# Patient Record
Sex: MALE | Race: WHITE | NOT HISPANIC OR LATINO
[De-identification: names, ages, dates, MRNs, and addresses within clinical notes are randomized per-mention and may not be internally consistent; named-entity substitution may affect disease eponyms.]

---

## 2020-10-06 ENCOUNTER — TRANSCRIPTION ENCOUNTER (OUTPATIENT)
Age: 85
End: 2020-10-06

## 2020-12-13 ENCOUNTER — INPATIENT (INPATIENT)
Facility: HOSPITAL | Age: 85
LOS: 9 days | Discharge: NURSING, REHAB OR LONG-TERM CARE | End: 2020-12-23
Attending: INTERNAL MEDICINE | Admitting: INTERNAL MEDICINE
Payer: MEDICARE

## 2020-12-13 VITALS
DIASTOLIC BLOOD PRESSURE: 65 MMHG | HEART RATE: 95 BPM | OXYGEN SATURATION: 95 % | RESPIRATION RATE: 18 BRPM | TEMPERATURE: 98 F | SYSTOLIC BLOOD PRESSURE: 146 MMHG

## 2020-12-13 DIAGNOSIS — L89.159 PRESSURE ULCER OF SACRAL REGION, UNSPECIFIED STAGE: ICD-10-CM

## 2020-12-13 DIAGNOSIS — D72.829 ELEVATED WHITE BLOOD CELL COUNT, UNSPECIFIED: ICD-10-CM

## 2020-12-13 LAB
ALBUMIN SERPL ELPH-MCNC: 2.8 G/DL — LOW (ref 3.3–5)
ALP SERPL-CCNC: 132 U/L — HIGH (ref 40–120)
ALT FLD-CCNC: 16 U/L — SIGNIFICANT CHANGE UP (ref 4–41)
ANION GAP SERPL CALC-SCNC: 9 MMOL/L — SIGNIFICANT CHANGE UP (ref 7–14)
APPEARANCE UR: ABNORMAL
AST SERPL-CCNC: 14 U/L — SIGNIFICANT CHANGE UP (ref 4–40)
BASOPHILS # BLD AUTO: 0 K/UL — SIGNIFICANT CHANGE UP (ref 0–0.2)
BASOPHILS NFR BLD AUTO: 0 % — SIGNIFICANT CHANGE UP (ref 0–2)
BILIRUB SERPL-MCNC: 0.5 MG/DL — SIGNIFICANT CHANGE UP (ref 0.2–1.2)
BILIRUB UR-MCNC: NEGATIVE — SIGNIFICANT CHANGE UP
BUN SERPL-MCNC: 38 MG/DL — HIGH (ref 7–23)
CALCIUM SERPL-MCNC: 9.2 MG/DL — SIGNIFICANT CHANGE UP (ref 8.4–10.5)
CHLORIDE SERPL-SCNC: 111 MMOL/L — HIGH (ref 98–107)
CO2 SERPL-SCNC: 28 MMOL/L — SIGNIFICANT CHANGE UP (ref 22–31)
COLOR SPEC: YELLOW — SIGNIFICANT CHANGE UP
CREAT SERPL-MCNC: 0.77 MG/DL — SIGNIFICANT CHANGE UP (ref 0.5–1.3)
DIFF PNL FLD: ABNORMAL
EOSINOPHIL # BLD AUTO: 0 K/UL — SIGNIFICANT CHANGE UP (ref 0–0.5)
EOSINOPHIL NFR BLD AUTO: 0 % — SIGNIFICANT CHANGE UP (ref 0–6)
GLUCOSE SERPL-MCNC: 196 MG/DL — HIGH (ref 70–99)
GLUCOSE UR QL: ABNORMAL
HCT VFR BLD CALC: 28.1 % — LOW (ref 39–50)
HGB BLD-MCNC: 8.6 G/DL — LOW (ref 13–17)
IANC: 26.04 K/UL — HIGH (ref 1.5–8.5)
KETONES UR-MCNC: NEGATIVE — SIGNIFICANT CHANGE UP
LEUKOCYTE ESTERASE UR-ACNC: ABNORMAL
LYMPHOCYTES # BLD AUTO: 2.03 K/UL — SIGNIFICANT CHANGE UP (ref 1–3.3)
LYMPHOCYTES # BLD AUTO: 6.9 % — LOW (ref 13–44)
MCHC RBC-ENTMCNC: 30.6 GM/DL — LOW (ref 32–36)
MCHC RBC-ENTMCNC: 31.6 PG — SIGNIFICANT CHANGE UP (ref 27–34)
MCV RBC AUTO: 103.3 FL — HIGH (ref 80–100)
MONOCYTES # BLD AUTO: 0.5 K/UL — SIGNIFICANT CHANGE UP (ref 0–0.9)
MONOCYTES NFR BLD AUTO: 1.7 % — LOW (ref 2–14)
NEUTROPHILS # BLD AUTO: 26.38 K/UL — HIGH (ref 1.8–7.4)
NEUTROPHILS NFR BLD AUTO: 83.5 % — HIGH (ref 43–77)
NITRITE UR-MCNC: NEGATIVE — SIGNIFICANT CHANGE UP
PH UR: 6 — SIGNIFICANT CHANGE UP (ref 5–8)
PLATELET # BLD AUTO: 223 K/UL — SIGNIFICANT CHANGE UP (ref 150–400)
POTASSIUM SERPL-MCNC: 3.7 MMOL/L — SIGNIFICANT CHANGE UP (ref 3.5–5.3)
POTASSIUM SERPL-SCNC: 3.7 MMOL/L — SIGNIFICANT CHANGE UP (ref 3.5–5.3)
PROT SERPL-MCNC: 6.7 G/DL — SIGNIFICANT CHANGE UP (ref 6–8.3)
PROT UR-MCNC: ABNORMAL
RBC # BLD: 2.72 M/UL — LOW (ref 4.2–5.8)
RBC # FLD: 16.4 % — HIGH (ref 10.3–14.5)
SARS-COV-2 RNA SPEC QL NAA+PROBE: SIGNIFICANT CHANGE UP
SODIUM SERPL-SCNC: 148 MMOL/L — HIGH (ref 135–145)
SP GR SPEC: 1.02 — SIGNIFICANT CHANGE UP (ref 1.01–1.02)
UROBILINOGEN FLD QL: SIGNIFICANT CHANGE UP
WBC # BLD: 29.44 K/UL — HIGH (ref 3.8–10.5)
WBC # FLD AUTO: 29.44 K/UL — HIGH (ref 3.8–10.5)

## 2020-12-13 PROCEDURE — 74018 RADEX ABDOMEN 1 VIEW: CPT | Mod: 26

## 2020-12-13 PROCEDURE — 99283 EMERGENCY DEPT VISIT LOW MDM: CPT

## 2020-12-13 PROCEDURE — 71045 X-RAY EXAM CHEST 1 VIEW: CPT | Mod: 26

## 2020-12-13 PROCEDURE — 99223 1ST HOSP IP/OBS HIGH 75: CPT

## 2020-12-13 RX ORDER — SODIUM CHLORIDE 9 MG/ML
1000 INJECTION, SOLUTION INTRAVENOUS
Refills: 0 | Status: DISCONTINUED | OUTPATIENT
Start: 2020-12-13 | End: 2020-12-23

## 2020-12-13 RX ORDER — PIPERACILLIN AND TAZOBACTAM 4; .5 G/20ML; G/20ML
3.38 INJECTION, POWDER, LYOPHILIZED, FOR SOLUTION INTRAVENOUS ONCE
Refills: 0 | Status: COMPLETED | OUTPATIENT
Start: 2020-12-13 | End: 2020-12-13

## 2020-12-13 RX ORDER — PIPERACILLIN AND TAZOBACTAM 4; .5 G/20ML; G/20ML
3.38 INJECTION, POWDER, LYOPHILIZED, FOR SOLUTION INTRAVENOUS EVERY 8 HOURS
Refills: 0 | Status: DISCONTINUED | OUTPATIENT
Start: 2020-12-14 | End: 2020-12-14

## 2020-12-13 RX ORDER — LEVETIRACETAM 250 MG/1
500 TABLET, FILM COATED ORAL EVERY 12 HOURS
Refills: 0 | Status: DISCONTINUED | OUTPATIENT
Start: 2020-12-13 | End: 2020-12-16

## 2020-12-13 RX ORDER — VANCOMYCIN HCL 1 G
1000 VIAL (EA) INTRAVENOUS ONCE
Refills: 0 | Status: COMPLETED | OUTPATIENT
Start: 2020-12-13 | End: 2020-12-13

## 2020-12-13 RX ORDER — SODIUM CHLORIDE 9 MG/ML
3 INJECTION INTRAMUSCULAR; INTRAVENOUS; SUBCUTANEOUS EVERY 8 HOURS
Refills: 0 | Status: DISCONTINUED | OUTPATIENT
Start: 2020-12-13 | End: 2020-12-23

## 2020-12-13 RX ADMIN — PIPERACILLIN AND TAZOBACTAM 3.38 GRAM(S): 4; .5 INJECTION, POWDER, LYOPHILIZED, FOR SOLUTION INTRAVENOUS at 19:18

## 2020-12-13 RX ADMIN — SODIUM CHLORIDE 3 MILLILITER(S): 9 INJECTION INTRAMUSCULAR; INTRAVENOUS; SUBCUTANEOUS at 21:49

## 2020-12-13 RX ADMIN — Medication 250 MILLIGRAM(S): at 20:11

## 2020-12-13 RX ADMIN — PIPERACILLIN AND TAZOBACTAM 200 GRAM(S): 4; .5 INJECTION, POWDER, LYOPHILIZED, FOR SOLUTION INTRAVENOUS at 18:54

## 2020-12-13 NOTE — H&P ADULT - NSHPREVIEWOFSYSTEMS_GEN_ALL_CORE
Patient non verbal at this time Awake but Oriented x 0 unable to provide any medical information Patient with very limited verbal output and limited cognition;  ROS incomplete due to the above;

## 2020-12-13 NOTE — H&P ADULT - PROBLEM SELECTOR PLAN 4
h/o ICH s/p fall 11/3/20 - stable   Seizure ppx - Continue  Keppra 100 mg / ml oral q 12 hrs via PEG   Continue Folic Acid 1mg qd via PEG   Continue Vitamin B12 via PEG   Patient on ASA 81 mg via PEG PEG tube [facility]ged [facility] then s/p PEG replacement at [facility]  s/p PEG in situ   f/u Abdominal XRay report   GI c/s in for PEG placement verification in AM  NPO for now  IV hydration while NPO  Convert all oral essential medications to IV form

## 2020-12-13 NOTE — H&P ADULT - PROBLEM SELECTOR PLAN 3
s/p PEG in situ verify PEG placement  Abdominal XRay ordered Unstageable sacral ulcer 2x3cm  Wound care consult and evaluation requested

## 2020-12-13 NOTE — H&P ADULT - NSHPSOCIALHISTORY_GEN_ALL_CORE
Resident at [facility] - Bed Bound    h/o Chronic ETOH abuse   Hard of Hearing Resident at [facility] - Bed Bound    h/o Chronic ETOH abuse   Hard of Hearing  Bedbound/debility

## 2020-12-13 NOTE — H&P ADULT - ASSESSMENT
Patient is a 89 y/o white male resident at  [facility] with PMHx significant for HTN, DM, [place], chronic ETOH abuse, ICH s/p fall (recently treated at [facility] on 11/3/20 for ICH s/p fall & DKA) started on Keppra for seizure ppx and dysphagia s/p PEG placement and chronic zamudio. While at [facility] pt's PEG tube was dislodged s/p PEG replacement at [facility] transferred to [facility] to verify PEG tube placement.   Patient is Awake but Oriented  x 0, unable to give any information. In the ER pt was found to have leukocytosis WBC 29.4, UA+ pt was afebrile, Vital signs were stable pt was given a dose of Zosyn IV & Vanco IV.      89 y/o white male resident at  [facility] with PMHx significant for HTN, DM, [place], chronic ETOH abuse, ICH s/p fall (recently treated at [facility] on 11/3/20 for ICH s/p fall & DKA) started on Keppra for seizure ppx and dysphagia s/p PEG placement and chronic zamudio. While at [facility] pt's PEG tube was dislodged s/p PEG replacement at [facility] transferred to [facility] to verify PEG tube placement.   Patient is Awake but Oriented  x 0, unable to give any information. In the ER pt was found to have leukocytosis WBC 29.4, UA+ pt was afebrile, Vital signs were stable pt was given a dose of Zosyn IV & Vanco IV.     89 y/o white male, bedbound, resident at  [facility] with MHx significant for HTN, DM, [place], h/o chronic ETOH abuse, ICH s/p fall (recently treated at [facility] on 11/3/20 for ICH s/p fall & DKA), dysphagia s/p PEG, chronic zamudio a/w PEG tube malfunction; Found to be septic likely due to complicated UTI;  89 y/o white male, bedbound, resident at  [facility] with MHx significant for HTN, DM, [place], h/o chronic ETOH abuse, ICH s/p fall (recently treated at [facility] on 11/3/20 for ICH s/p fall & DKA), dysphagia s/p PEG, chronic zamudio a/w PEG tube malfunction; Found to be septic likely due to complicated UTI; Unclear indication for existing PICC line vs PICC line malfunction;  89 y/o white male, bedbound, resident at  [facility] with MHx significant for HTN, DM, [place], h/o chronic ETOH abuse, ICH s/p fall (recently treated at [facility] on 11/3/20 for ICH s/p fall & DKA), dysphagia s/p PEG, chronic zamudio a/w PEG tube malfunction; Found to be septic likely due to complicated UTI r/o PNa; Unclear indication for existing PICC line vs PICC line malfunction; Found to be cachectic with FTT;

## 2020-12-13 NOTE — H&P ADULT - CONSTITUTIONAL DETAILS
elderly white male A & Ox 3, was laying comfortably in bed in no acute distress/well-developed/no distress no acute distress/no distress/cachectic

## 2020-12-13 NOTE — ED PROVIDER NOTE - MUSCULOSKELETAL, MLM
[facility]  [address]  [facility] [address]  Phone: [phone]        May 28, 2019    [address] [place] [address] Street [facility]      To whom it may concern Juanjose Bonner was admitted to [facility] on 5/18/2019-5/24/2019. The parent Michael Ríos was present with him. If you have any questions or concerns, please don't hesitate to call.     Sincerely,
[facility]  [address]  [facility] [address]  Phone: [phone]    No name on file. May 28, 2019    [address] [place] [address]      To whom it may concern Amara Chavez was admitted to [facility] on 5/24/2019-5/18/2019. The parent Rupert Whiting was present here with him. If you have any questions or concerns, please don't hesitate to call. Sincerely,     Andriy Kirkland RN       No name on file.
[facility]  [address] [facility] [address]  Phone: [phone]            May 28, 2019      To whom it may concern,  Joanne Dempsey was a patient at [facility] from 5/24/19- 5/28/19.       Sincerely,        Hallie Kim RN
Spine appears normal, range of motion is not limited, no muscle or joint tenderness

## 2020-12-13 NOTE — H&P ADULT - PROBLEM SELECTOR PLAN 2
h/o Urinary retention on Doxazosin 4 mg qd via PEG  Monitor UO -Urinary catheter: replaced on 9S  -Empiric zosyn  -BCx, UCx pending

## 2020-12-13 NOTE — ED PROVIDER NOTE - PROGRESS NOTE DETAILS
Yury Curiel, PGY-2: Patient's WBC 29, called sarai Singh and on call physician reported WBC was 8 a week PTA. CXR showing LLL PNA, will treat with vanc and zosyn for healthcare associated PNA at this time. Tube check completed at bedside, peg tube in place.

## 2020-12-13 NOTE — H&P ADULT - PROBLEM SELECTOR PROBLEM 8
Decubitus ulcer of sacral area CAD (coronary artery disease) Type 2 diabetes mellitus with hyperglycemia, with long-term current use of insulin

## 2020-12-13 NOTE — H&P ADULT - NSHPLABSRESULTS_GEN_ALL_CORE
Vital Signs Last 24 Hrs  T(C): 36.8 (13 Dec 2020 20:54), Max: 37.4 (13 Dec 2020 17:12)  T(F): 98.2 (13 Dec 2020 20:54), Max: 99.3 (13 Dec 2020 17:12)  HR: 87 (13 Dec 2020 20:54) (83 - 95)  BP: 156/74 (13 Dec 2020 20:54) (136/56 - 156/74)  RR: 16 (13 Dec 2020 20:54) (16 - 18)  SpO2: 97% (13 Dec 2020 20:54) (95% - 99%)                        8.6    29.44 )-----------( 223      ( 13 Dec 2020 15:53 )             28.1       148<H>  |  111<H>  |  38<H>  ----------------------------<  196<H>  3.7   |  28  |  0.77    Ca    9.2      13 Dec 2020 15:53    TPro  6.7  /  Alb  2.8<L>  /  TBili  0.5  /  DBili  x   /  AST  14  /  ALT  16  /  AlkPhos  132<H>  12-13  Urinalysis Basic - ( 13 Dec 2020 17:33 )    Color: Yellow / Appearance: Slightly Turbid / S.024 / pH: x  Gluc: x / Ketone: Negative  / Bili: Negative / Urobili: <2 mg/dL   Blood: x / Protein: 100 mg/dL / Nitrite: Negative   Leuk Esterase: Small / RBC: 6 /HPF / WBC 30 /HPF   Sq Epi: x / Non Sq Epi: 0 /HPF / Bacteria: Many    < from: Xray Chest 1 View- PORTABLE-Urgent (Xray Chest 1 View- PORTABLE-Urgent .) (20 @ 17:55) >    . Hazy airspace opacities in the left lower lung, which may represent pneumonia in appropriate clinical setting.  2.  Pulmonary nodule measuring 3.0 cm in the left lower lung. Further evaluation with noncontrast Chest CT can be obtained if clinically warranted.  3.  No PICC line visible. EKG, , NSR, 85bpm, qtc 397, no acute Tw or ST changes - my reading     XR CHEST PORTABLE URGENT 1V  PROCEDURE DATE: Dec 13 2020  No PICC line is seen in this image.  Left retrocardiac opacity likely subsegmental atelectasis.  No pleural effusion or pneumothorax.  Heart size is within normal limits.  No acute abnormality within visible osseous structures.  Pulmonary nodule measuring 3.0 cm in the left lower lung.  Nodular opacity in the right hemithorax measuring 1.3 cm, may be the patient's nipple versus a pulmonary nodule.  IMPRESSION:  1. Hazy airspace opacities in the left lower lung, which may represent pneumonia in appropriate clinical setting.  2. Pulmonary nodule measuring 3.0 cm in the left lower lung. Further evaluation with noncontrast Chest CT can be obtained if clinically warranted.  3. No PICC line visible.                        8.6    29.44 )-----------( 223      ( 13 Dec 2020 15:53 )             28.1       148<H>  |  111<H>  |  38<H>  ----------------------------<  196<H>  3.7   |  28  |  0.77    Ca    9.2      13 Dec 2020 15:53    TPro  6.7  /  Alb  2.8<L>  /  TBili  0.5  /  DBili  x   /  AST  14  /  ALT  16  /  AlkPhos  132<H>  12-  Urinalysis Basic - ( 13 Dec 2020 17:33 )    Color: Yellow / Appearance: Slightly Turbid / S.024 / pH: x  Gluc: x / Ketone: Negative  / Bili: Negative / Urobili: <2 mg/dL   Blood: x / Protein: 100 mg/dL / Nitrite: Negative   Leuk Esterase: Small / RBC: 6 /HPF / WBC 30 /HPF   Sq Epi: x / Non Sq Epi: 0 /HPF / Bacteria: Many

## 2020-12-13 NOTE — H&P ADULT - PROBLEM SELECTOR PLAN 6
NPO   Fingerstick q 6 hrs  Insulin sliding scale Monitor BP per routine  Norvasc 10 mg qd via PEG   Hydralazine 10 mg q 8 hrs via PEG NPO given PEG tube malfunction   Fingerstick q 6 hrs  Insulin sliding scale Found to be severely cachectic with clear evidence of FTT including TBW 39kg, severe hypoalbuminemia of 2.8, severe dehydration BUN: Scr ratio of 50; Hypernatremia of 148;   -Palliative care team c/s for GOC and DNR/DNI status prior to d/c to [facility]  -IV hydration, 1/2 NS Found to be severely cachectic with clear evidence of FTT including TBW 39kg, severe hypoalbuminemia of 2.8, severe dehydration BUN: Scr ratio of 50; Hypernatremia of 148;   -Palliative care team c/s for GOC and DNR/DNI status prior to d/c to [facility]  -IV hydration, 1/2 NS  -Nutritionist c/s in AM

## 2020-12-13 NOTE — ED PROVIDER NOTE - ATTENDING CONTRIBUTION TO CARE
mitch: pt 89 man multiple medical issues pt at [place] transferred to [facility] to evaluate peg tube placement.  pt poorly communicative, has very large tongue, dry membranes, bs bilaterally.  abd soft.  at sign out 4pm awaiting labs and film for peg placement.  signed out to Dr. Sierra at 4pm.    I performed a history and physical exam of the patient and discussed their management with the resident and /or advanced care provider. I reviewed the resident and /or ACP's note and agree with the documented findings and plan of care. My medical decison making and observations are found above.

## 2020-12-13 NOTE — H&P ADULT - PROBLEM SELECTOR PROBLEM 5
HTN (hypertension) ICH (intracerebral hemorrhage) R/O PICC (peripherally inserted central catheter) removal

## 2020-12-13 NOTE — H&P ADULT - HISTORY OF PRESENT ILLNESS
Patient is a 89 y/o white male resident at  [facility] with PMHx significant for HTN, DM, [place], chronic ETOH abuse, ICH s/p fall (recently treated at [facility] on 11/3/20 for ICH s/p fall & DKA) started on Keppra for seizure ppx and dysphagia s/p PEG placement and chronic zamudio. While at [facility] pt's PEG tube was dislodged s/p PEG replacement at [facility] transferred to [facility] to verify PEG tube placement.   Patient is Awake but Oriented  x 0, unable to give any information. In the ER pt was found to have leukocytosis WBC 29.4, UA+ pt was afebrile, Vital signs were stable pt was given a dose of Zosyn IV & Vanco IV.  89 y/o white male resident at  [facility] with PMHx significant for HTN, DM, [place], chronic ETOH abuse, ICH s/p fall (recently treated at [facility] on 11/3/20 for ICH s/p fall & DKA) started on Keppra for seizure ppx and dysphagia s/p PEG placement and chronic zamudio. While at [facility] pt's PEG tube was dislodged s/p PEG replacement at [facility] transferred to [facility] to verify PEG tube placement.   Patient is Awake but Oriented  x 0, unable to give any information. In the ER pt was found to have leukocytosis WBC 29.4, UA+ pt was afebrile, Vital signs were stable pt was given a dose of Zosyn IV & Vanco IV.  89 y/o white male, bedbound, resident at  [facility] with MHx significant for HTN, DM, [place], h/o chronic ETOH abuse, ICH s/p fall (recently treated at [facility] on 11/3/20 for ICH s/p fall & DKA) started on Keppra for seizure ppx and dysphagia s/p PEG placement and chronic zamudio. While at [facility] pt's PEG tube was dislodged s/p PEG replacement at \[facility]\"" transferred to [facility] to verify PEG tube placement. Patient is awake however with limited cognition, able to provide only simplified ROS, voices no complaints.     ED course: Zosyn IV & Vanco IV;

## 2020-12-13 NOTE — ED ADULT TRIAGE NOTE - CHIEF COMPLAINT QUOTE
Pt brought in by EMS from [place] for peg tube dislodgement, which was put back in by Nils but sent for placement check.

## 2020-12-13 NOTE — H&P ADULT - GASTROINTESTINAL COMMENTS
s/p PEG tube placement at Left abdominal wall - site clean no discharge noted, no erythema s/p PEG tube placement at Left abdominal wall - site clean no discharge noted, no erythema, no TTP

## 2020-12-13 NOTE — H&P ADULT - PROBLEM SELECTOR PLAN 9
VTE ppx - Venodynes bilateral lower extremities h/o ICH s/p fall 11/3/20 - stable   Seizure ppx - Continue  Keppra 100 mg / ml oral q 12 hrs via PEG   Continue Folic Acid 1mg qd via PEG   Continue Vitamin B12 via PEG   Patient on ASA 81 mg via PEG  NPO - pending PEG tube placement confirmation by GI  Aspiration precautions h/o ICH s/p fall 11/3/20 - stable   Seizure ppx - Continue  Keppra 100 mg / ml oral q 12 hrs via PEG   No Heparin for DVT ppx --> SCDs  Continue Folic Acid 1mg qd via PEG   Continue Vitamin B12 via PEG   Patient on ASA 81 mg via PEG  NPO - pending PEG tube placement confirmation by GI  Aspiration precautions

## 2020-12-13 NOTE — H&P ADULT - MENTAL STATUS
Awake, but Oriented  x 0 Not lethargic, follows some simple one-step commands, oriented to self and partially to place;

## 2020-12-13 NOTE — H&P ADULT - PROBLEM SELECTOR PROBLEM 7
CAD (coronary artery disease) DM2 (diabetes mellitus, type 2) ICH (intracerebral hemorrhage) Macrocytic anemia

## 2020-12-13 NOTE — H&P ADULT - NSICDXPASTMEDICALHX_GEN_ALL_CORE_FT
PAST MEDICAL HISTORY:  CAD (coronary artery disease)     DM2 (diabetes mellitus, type 2)     Dysphagia s/p PEG placement    ETOH abuse chronic ETOH abuse    HTN (hypertension)     ICH (intracerebral hemorrhage) s/p fall

## 2020-12-13 NOTE — H&P ADULT - PROBLEM SELECTOR PLAN 5
Monitor BP per routine  Norvasc 10 mg qd via PEG   Hydralazine 10 mg q 8 hrs via PEG h/o ICH s/p fall 11/3/20 - stable   Seizure ppx - Continue  Keppra 100 mg / ml oral q 12 hrs via PEG   Continue Folic Acid 1mg qd via PEG   Continue Vitamin B12 via PEG   Patient on ASA 81 mg via PEG Unclear indication for existing PICC line vs PICC line malfunction;  Official CXR report: No PICC line visible.  -Stop accessing PICC line order for RN entered - 9S nursing aware, s/p peripheral   line placement;  -Reach out to [facility] in regarding PICC line indication   -IR c/s for removal or correct placement confirmation given inconsistency between physical line exam/presence and CXR report;

## 2020-12-13 NOTE — H&P ADULT - PROBLEM SELECTOR PLAN 7
EKG pending   CAD stable on ASA 81 mg qd via PEG NPO   Fingerstick q 6 hrs  Insulin sliding scale h/o ICH s/p fall 11/3/20 - stable   Seizure ppx - Continue  Keppra 100 mg / ml oral q 12 hrs via PEG   Continue Folic Acid 1mg qd via PEG   Continue Vitamin B12 via PEG   Patient on ASA 81 mg via PEG , Hgb likely falsely elevated, 8.6, due to severe dehydration with BUN: Scr ratio of 50;   -Occult blood  -B12, TSH, Folate in AM  -Continue Folic Acid 1mg qd once PEG confirmed  -Continue Vitamin B12 once PEG confirmed   -SCDs pending occult blood , Hgb likely falsely elevated, 8.6, due to severe dehydration with BUN: Scr ratio of 50;   -Occult blood  -B12, TSH, Folate in AM  -Continue Folic Acid 1mg qd once PEG confirmed  -Continue Vitamin B12 once PEG confirmed   -SCDs pending occult blood  -Monitor Hgb daily, recheck after rehydrated

## 2020-12-13 NOTE — ED PROVIDER NOTE - OBJECTIVE STATEMENT
Patient is 89 yom with PMH of chronic etoh abuse, dm2, from [facility] after recent admission at [facility]  with management of ICH and DKA after a fall on 11/3. At [facility], he was treated for DKA, started on keppra for seizure ppx, and received a PEG tube for dysphagia. Patient also has a chronic zamudio.    Now patient was transferred from [facility] for dislodgement of his PEG tube. It was replaced and he is transferred here for placement. Patient now is very [place] and slow to respond. Patient denies abdominal pain/n/v/shortness of breath/CP.

## 2020-12-13 NOTE — H&P ADULT - PROBLEM SELECTOR PROBLEM 1
UTI (urinary tract infection) Sepsis, due to unspecified organism, unspecified whether acute organ dysfunction present

## 2020-12-13 NOTE — H&P ADULT - NSHPSOURCEINFOTX_GEN_ALL_CORE
Medical Records from [facility] Medical Records from [facility], Medrec from [facility]

## 2020-12-13 NOTE — H&P ADULT - LYMPHATIC
posterior cervical L/anterior cervical L posterior cervical L/posterior cervical R/anterior cervical L/anterior cervical R/supraclavicular L/supraclavicular R

## 2020-12-13 NOTE — ED ADULT NURSE NOTE - CHIEF COMPLAINT QUOTE
Pt brought in by EMS from [place] for peg tube dislodgement, which was put back in by Nils but sent for placement check.

## 2020-12-13 NOTE — H&P ADULT - REASON FOR ADMISSION
s/p PEG tube dislodgement @ [facility] s/p PEG replacement at hospitals transferred to [facility] to verify PEG tube placement PEG tube dislodgement

## 2020-12-13 NOTE — ED PROVIDER NOTE - CLINICAL SUMMARY MEDICAL DECISION MAKING FREE TEXT BOX
Patient is 89 yom with PMH of chronic etoh abuse, dm2, from [facility] for peg tube placement check. found to also have leukocytosis of 29. will get infectious workup including UA, CXR, BCx, abd xray. Likely admit now. Patient is 89 yom with PMH of chronic etoh abuse, dm2, from [facility] for peg tube placement check. found to also have leukocytosis of 29. will get infectious workup including UA, CXR, BCx, abd xray. Likely admit now.    mitch: pt 89 man multiple medical issues pt at [place] transferred to [facility] to evaluate peg tube placement.  pt poorly communicative, has very large tongue, dry membranes, bs bilaterally.  abd soft.  at sign out 4pm awaiting labs and film for peg placement.  signed out to Dr. Sierra at 4pm.

## 2020-12-13 NOTE — H&P ADULT - PROBLEM SELECTOR PROBLEM 6
DM2 (diabetes mellitus, type 2) HTN (hypertension) Type 2 diabetes mellitus with hyperglycemia, with long-term current use of insulin FTT (failure to thrive) in adult

## 2020-12-13 NOTE — H&P ADULT - PROBLEM SELECTOR PLAN 8
Unstageable Sacral ulcer   Wound care consult and evaluation EKG pending   CAD stable on ASA 81 mg qd via PEG NPO given PEG tube malfunction   Fingerstick q 6 hrs  Insulin sliding scale  A1c in AM

## 2020-12-13 NOTE — ED ADULT TRIAGE NOTE - BP NONINVASIVE SYSTOLIC (MM HG)
Asc Procedure Text (A): After obtaining clear surgical margins the patient was sent to an ASC for surgical repair.  The patient understands they will receive post-surgical care and follow-up from the ASC physician. 146

## 2020-12-13 NOTE — ED ADULT NURSE REASSESSMENT NOTE - NS ED NURSE REASSESS COMMENT FT1
Report received from ASHLIE SALTER. Pt. received A&Ox0, with RUE PICC line, 14 Persian zamudio in place draining at bedside, unstageable pressure ulcer to sacrum quarter size and covered with Allevyn dressing. Appears in no acute distress. Respirations even and unlabored. Admitted to Medicine, awaiting bed assignment. Report given to [facility] ASHLIE Kauffman. Pt. transported to [facility] at present.

## 2020-12-14 DIAGNOSIS — K94.23 GASTROSTOMY MALFUNCTION: ICD-10-CM

## 2020-12-14 DIAGNOSIS — E11.65 TYPE 2 DIABETES MELLITUS WITH HYPERGLYCEMIA: ICD-10-CM

## 2020-12-14 DIAGNOSIS — D53.9 NUTRITIONAL ANEMIA, UNSPECIFIED: ICD-10-CM

## 2020-12-14 LAB
ANION GAP SERPL CALC-SCNC: 11 MMOL/L — SIGNIFICANT CHANGE UP (ref 7–14)
BUN SERPL-MCNC: 31 MG/DL — HIGH (ref 7–23)
CALCIUM SERPL-MCNC: 8.9 MG/DL — SIGNIFICANT CHANGE UP (ref 8.4–10.5)
CHLORIDE SERPL-SCNC: 109 MMOL/L — HIGH (ref 98–107)
CO2 SERPL-SCNC: 28 MMOL/L — SIGNIFICANT CHANGE UP (ref 22–31)
CREAT SERPL-MCNC: 0.74 MG/DL — SIGNIFICANT CHANGE UP (ref 0.5–1.3)
GLUCOSE BLDC GLUCOMTR-MCNC: 255 MG/DL — HIGH (ref 70–99)
GLUCOSE SERPL-MCNC: 257 MG/DL — HIGH (ref 70–99)
GRAM STN FLD: SIGNIFICANT CHANGE UP
HCT VFR BLD CALC: 28.6 % — LOW (ref 39–50)
HGB BLD-MCNC: 9.1 G/DL — LOW (ref 13–17)
INR BLD: 1.32 RATIO — HIGH (ref 0.88–1.17)
LEGIONELLA AG UR QL: NEGATIVE — SIGNIFICANT CHANGE UP
MAGNESIUM SERPL-MCNC: 2.1 MG/DL — SIGNIFICANT CHANGE UP (ref 1.6–2.6)
MCHC RBC-ENTMCNC: 31.8 GM/DL — LOW (ref 32–36)
MCHC RBC-ENTMCNC: 31.9 PG — SIGNIFICANT CHANGE UP (ref 27–34)
MCV RBC AUTO: 100.4 FL — HIGH (ref 80–100)
METHOD TYPE: SIGNIFICANT CHANGE UP
MRSA SPEC QL CULT: SIGNIFICANT CHANGE UP
NRBC # BLD: 0 /100 WBCS — SIGNIFICANT CHANGE UP
NRBC # FLD: 0 K/UL — SIGNIFICANT CHANGE UP
PLATELET # BLD AUTO: 224 K/UL — SIGNIFICANT CHANGE UP (ref 150–400)
POTASSIUM SERPL-MCNC: 3.8 MMOL/L — SIGNIFICANT CHANGE UP (ref 3.5–5.3)
POTASSIUM SERPL-SCNC: 3.8 MMOL/L — SIGNIFICANT CHANGE UP (ref 3.5–5.3)
PROTHROM AB SERPL-ACNC: 15 SEC — HIGH (ref 9.8–13.1)
RBC # BLD: 2.85 M/UL — LOW (ref 4.2–5.8)
RBC # FLD: 15.9 % — HIGH (ref 10.3–14.5)
SODIUM SERPL-SCNC: 148 MMOL/L — HIGH (ref 135–145)
SPECIMEN SOURCE: SIGNIFICANT CHANGE UP
SPECIMEN SOURCE: SIGNIFICANT CHANGE UP
WBC # BLD: 26.8 K/UL — HIGH (ref 3.8–10.5)
WBC # FLD AUTO: 26.8 K/UL — HIGH (ref 3.8–10.5)

## 2020-12-14 PROCEDURE — 99223 1ST HOSP IP/OBS HIGH 75: CPT | Mod: GC

## 2020-12-14 PROCEDURE — 99233 SBSQ HOSP IP/OBS HIGH 50: CPT

## 2020-12-14 RX ORDER — FOLIC ACID 0.8 MG
1 TABLET ORAL
Qty: 0 | Refills: 0 | DISCHARGE

## 2020-12-14 RX ORDER — DEXTROSE 50 % IN WATER 50 %
12.5 SYRINGE (ML) INTRAVENOUS ONCE
Refills: 0 | Status: DISCONTINUED | OUTPATIENT
Start: 2020-12-14 | End: 2020-12-16

## 2020-12-14 RX ORDER — INSULIN LISPRO 100/ML
VIAL (ML) SUBCUTANEOUS AT BEDTIME
Refills: 0 | Status: DISCONTINUED | OUTPATIENT
Start: 2020-12-14 | End: 2020-12-15

## 2020-12-14 RX ORDER — ATORVASTATIN CALCIUM 80 MG/1
1 TABLET, FILM COATED ORAL
Qty: 0 | Refills: 0 | DISCHARGE

## 2020-12-14 RX ORDER — ASPIRIN/CALCIUM CARB/MAGNESIUM 324 MG
1 TABLET ORAL
Qty: 0 | Refills: 0 | DISCHARGE

## 2020-12-14 RX ORDER — VANCOMYCIN HCL 1 G
500 VIAL (EA) INTRAVENOUS ONCE
Refills: 0 | Status: COMPLETED | OUTPATIENT
Start: 2020-12-14 | End: 2020-12-14

## 2020-12-14 RX ORDER — SODIUM CHLORIDE 9 MG/ML
1000 INJECTION, SOLUTION INTRAVENOUS
Refills: 0 | Status: DISCONTINUED | OUTPATIENT
Start: 2020-12-14 | End: 2020-12-16

## 2020-12-14 RX ORDER — VANCOMYCIN HCL 1 G
VIAL (EA) INTRAVENOUS
Refills: 0 | Status: DISCONTINUED | OUTPATIENT
Start: 2020-12-14 | End: 2020-12-14

## 2020-12-14 RX ORDER — PIPERACILLIN AND TAZOBACTAM 4; .5 G/20ML; G/20ML
3.38 INJECTION, POWDER, LYOPHILIZED, FOR SOLUTION INTRAVENOUS EVERY 8 HOURS
Refills: 0 | Status: DISCONTINUED | OUTPATIENT
Start: 2020-12-14 | End: 2020-12-16

## 2020-12-14 RX ORDER — CHLORHEXIDINE GLUCONATE 213 G/1000ML
1 SOLUTION TOPICAL DAILY
Refills: 0 | Status: DISCONTINUED | OUTPATIENT
Start: 2020-12-14 | End: 2020-12-14

## 2020-12-14 RX ORDER — VANCOMYCIN HCL 1 G
500 VIAL (EA) INTRAVENOUS EVERY 12 HOURS
Refills: 0 | Status: DISCONTINUED | OUTPATIENT
Start: 2020-12-15 | End: 2020-12-16

## 2020-12-14 RX ORDER — HYDRALAZINE HCL 50 MG
5 TABLET ORAL EVERY 6 HOURS
Refills: 0 | Status: DISCONTINUED | OUTPATIENT
Start: 2020-12-14 | End: 2020-12-16

## 2020-12-14 RX ORDER — VANCOMYCIN HCL 1 G
VIAL (EA) INTRAVENOUS
Refills: 0 | Status: DISCONTINUED | OUTPATIENT
Start: 2020-12-14 | End: 2020-12-16

## 2020-12-14 RX ORDER — DEXTROSE 50 % IN WATER 50 %
25 SYRINGE (ML) INTRAVENOUS ONCE
Refills: 0 | Status: DISCONTINUED | OUTPATIENT
Start: 2020-12-14 | End: 2020-12-16

## 2020-12-14 RX ORDER — DEXTROSE 50 % IN WATER 50 %
15 SYRINGE (ML) INTRAVENOUS ONCE
Refills: 0 | Status: DISCONTINUED | OUTPATIENT
Start: 2020-12-14 | End: 2020-12-16

## 2020-12-14 RX ORDER — PREGABALIN 225 MG/1
1 CAPSULE ORAL
Qty: 0 | Refills: 0 | DISCHARGE

## 2020-12-14 RX ORDER — CHLORHEXIDINE GLUCONATE 213 G/1000ML
1 SOLUTION TOPICAL DAILY
Refills: 0 | Status: DISCONTINUED | OUTPATIENT
Start: 2020-12-14 | End: 2020-12-23

## 2020-12-14 RX ORDER — AZITHROMYCIN 500 MG/1
500 TABLET, FILM COATED ORAL EVERY 24 HOURS
Refills: 0 | Status: COMPLETED | OUTPATIENT
Start: 2020-12-14 | End: 2020-12-14

## 2020-12-14 RX ORDER — GLUCAGON INJECTION, SOLUTION 0.5 MG/.1ML
1 INJECTION, SOLUTION SUBCUTANEOUS ONCE
Refills: 0 | Status: DISCONTINUED | OUTPATIENT
Start: 2020-12-14 | End: 2020-12-16

## 2020-12-14 RX ORDER — INSULIN LISPRO 100/ML
VIAL (ML) SUBCUTANEOUS
Refills: 0 | Status: DISCONTINUED | OUTPATIENT
Start: 2020-12-14 | End: 2020-12-15

## 2020-12-14 RX ADMIN — Medication 5 MILLIGRAM(S): at 13:22

## 2020-12-14 RX ADMIN — PIPERACILLIN AND TAZOBACTAM 25 GRAM(S): 4; .5 INJECTION, POWDER, LYOPHILIZED, FOR SOLUTION INTRAVENOUS at 01:51

## 2020-12-14 RX ADMIN — Medication 5 MILLIGRAM(S): at 17:49

## 2020-12-14 RX ADMIN — LEVETIRACETAM 400 MILLIGRAM(S): 250 TABLET, FILM COATED ORAL at 01:53

## 2020-12-14 RX ADMIN — SODIUM CHLORIDE 3 MILLILITER(S): 9 INJECTION INTRAMUSCULAR; INTRAVENOUS; SUBCUTANEOUS at 22:08

## 2020-12-14 RX ADMIN — SODIUM CHLORIDE 3 MILLILITER(S): 9 INJECTION INTRAMUSCULAR; INTRAVENOUS; SUBCUTANEOUS at 06:36

## 2020-12-14 RX ADMIN — SODIUM CHLORIDE 3 MILLILITER(S): 9 INJECTION INTRAMUSCULAR; INTRAVENOUS; SUBCUTANEOUS at 13:29

## 2020-12-14 RX ADMIN — Medication 5 MILLIGRAM(S): at 06:11

## 2020-12-14 RX ADMIN — AZITHROMYCIN 255 MILLIGRAM(S): 500 TABLET, FILM COATED ORAL at 01:51

## 2020-12-14 RX ADMIN — LEVETIRACETAM 400 MILLIGRAM(S): 250 TABLET, FILM COATED ORAL at 13:20

## 2020-12-14 RX ADMIN — PIPERACILLIN AND TAZOBACTAM 25 GRAM(S): 4; .5 INJECTION, POWDER, LYOPHILIZED, FOR SOLUTION INTRAVENOUS at 22:01

## 2020-12-14 RX ADMIN — PIPERACILLIN AND TAZOBACTAM 25 GRAM(S): 4; .5 INJECTION, POWDER, LYOPHILIZED, FOR SOLUTION INTRAVENOUS at 09:31

## 2020-12-14 RX ADMIN — SODIUM CHLORIDE 75 MILLILITER(S): 9 INJECTION, SOLUTION INTRAVENOUS at 01:50

## 2020-12-14 RX ADMIN — Medication 3: at 09:30

## 2020-12-14 RX ADMIN — Medication 100 MILLIGRAM(S): at 17:47

## 2020-12-14 NOTE — PROGRESS NOTE ADULT - PROBLEM SELECTOR PLAN 9
h/o ICH s/p fall 11/3/20 - stable   Seizure ppx - Continue  Keppra 100 mg / ml oral q 12 hrs via PEG   No Heparin for DVT ppx --> SCDs  Continue Folic Acid 1mg qd via PEG   Continue Vitamin B12 via PEG   Patient on ASA 81 mg via PEG  Aspiration precautions

## 2020-12-14 NOTE — CHART NOTE - TREATMENT: THE FOLLOWING DIET HAS BEEN RECOMMENDED
1. Recommend continuous TF via PEG with Glucerna1.5 [place] starts at 10ml/hr and increase 10ml q 8 hrs as tolerated to goal of 35ml/hr x24 hrs + No Carb Prosource x3 pkt daily (will provide a total of 1440kcal, 114g protein, 683ml free water, and 84% RDIs; ~36kcal/kg CBW and ~1.4g protein/kg IBW). Defer FWF to medical team. Consider adding Multivitamin to meet 100% RDIs. 2. Monitor weights, labs, BM's, skin integrity, TF intake/tolerance. 3. Further adjustments to rate/volume/duration/free water provision of enteral feeds dependant on long term monitoring of patient's tolerance and needs.     Please refer to nutrition assessment completed on 12/14/20 for other details.

## 2020-12-14 NOTE — DIETITIAN INITIAL EVALUATION ADULT. - ORAL INTAKE PTA/DIET HISTORY
Per [facility] transfer sheet on chart, on NPO + cyclic TF order with Glucerna1.5Cal 1000ml/d via PEG @ 60ml/hr from 4pm each day + LPS-SF 30ml via PEG 3x/day (a total of 1680kcal, 127.5g protein, and 759ml free water).

## 2020-12-14 NOTE — CONSULT NOTE ADULT - ATTENDING COMMENTS
89M with MRSA bacteremia, leukocytosis  -source is likely the line  -sacral wound doesn't appear infected  -favor DC right arm line if not needed  -vancomycin 500 mg iv q12  -check TTE    Amandeep Briceño  Attending Physician   Division of Infectious Disease  Pager #[phone]  After 5pm/weekend or no response, call #[phone]

## 2020-12-14 NOTE — DIETITIAN INITIAL EVALUATION ADULT. - PERTINENT LABORATORY DATA
12-14 Na148 mmol/L<H> Glu 257 mg/dL<H> K+ 3.8 mmol/L Cr  0.74 mg/dL BUN 31 mg/dL<H> 12-13 Alb 2.8 g/dL<L>

## 2020-12-14 NOTE — PROGRESS NOTE ADULT - ASSESSMENT
89 y/o white male, bedbound, resident at  [facility] with MHx significant for HTN, DM, [place], h/o chronic ETOH abuse, ICH s/p fall (recently treated at [facility] on 11/3/20 for ICH s/p fall & DKA), dysphagia s/p PEG, chronic zamudio a/w PEG tube malfunction; Found to be septic likely due to complicated UTI r/o PNa; Unclear indication for existing PICC line vs PICC line malfunction; Found to be cachectic with FTT; blood cultures positive for MRSA in 2 bottles

## 2020-12-14 NOTE — PROGRESS NOTE ADULT - ATTENDING COMMENTS
89 yo M w/ hx DM, [place], ICH s/p fall and DKA, Decubitus ulcer, chronic zamudio p/w dislodged PEG. PEG replaced and confirmed. On routine labs was found to have a  elevated WBC 28 prior WBC 8 1 week ago found to have LL PNA on Xray. Bcx- Gram positive cocci clusters PCR + MRSA   Sepsis secondary to UTI vs PNA-repeat Bcx                                                - vanco/zosyn                                                - TTE eval for endocarditis                                                -? indication for PICC line/PIV given bactermia will remove catheter  Decubitus Ulcer-wound care consult   hypernatremia-PEG tube in place and confirmed start glucerna 30 ml f/u nutrition recs in regards to optimal feeds 1/2 NS   DM- FS q6 ISS   Prior ICH- cont keppra 89 yo M w/ hx DM, [place], ICH s/p fall and DKA, Decubitus ulcer, chronic zamudio p/w dislodged PEG. PEG replaced and confirmed. On routine labs was found to have a  elevated WBC 28 prior WBC 8 1 week ago found to have LL PNA on Xray. Bcx- Gram positive cocci clusters PCR + MRSA   Sepsis secondary to UTI vs PNA vs line infection-repeat Bcx                                                - vanco/zosyn                                                - TTE eval for endocarditis                                                - CT chest to eval for PNA                                                 -? indication for PICC line/PIV given bactermia will remove catheter  Decubitus Ulcer-wound care consult   hypernatremia-PEG tube in place and confirmed start glucerna 30 ml f/u nutrition recs in regards to optimal feeds 1/2 NS   DM- FS q6 ISS   Prior ICH- cont keppra

## 2020-12-14 NOTE — PROGRESS NOTE ADULT - PROBLEM SELECTOR PLAN 6
Found to be severely cachectic with clear evidence of FTT including TBW 39kg, severe hypoalbuminemia of 2.8, severe dehydration BUN: Scr ratio of 50; Hypernatremia of 148;   -free water deficit of 0.5L. Added 200mL free water boluses Q6 hours. f/u BMP  -f/u nutrition recs

## 2020-12-14 NOTE — PROGRESS NOTE ADULT - PROBLEM SELECTOR PLAN 1
Leukocytosis 29K, Tachycardia, HR in 90s; UA c/w UTI; Chronic urinary catheter;   -PICC line, existing, of unclear indication - see plan below   -CXR: abnormal with possible PNA  -empiric Azithromycin IV x 1 dose pending Legionella Ag   -Zosyn IV  -Covid-19 negative  -MRSA positive blood culture x 2 bottles, started Vancomycin 600mg BID

## 2020-12-14 NOTE — PROGRESS NOTE ADULT - SUBJECTIVE AND OBJECTIVE BOX
Darnell Elkins MD PGY1   Team 7  Contact/Pager - [phone]      Patient is a 89y old  Male who presents with a chief complaint of PEG tube dislodgement (14 Dec 2020 12:59)        SUBJECTIVE / OVERNIGHT EVENTS: There were no events overnight. The patient is awake and alert. He is able to follow commands and responds to his name. His speech is unclear. He is not able to state his name, where he is, or the current year. He does not indicate any pain or discomfort.       MEDICATIONS  (STANDING):  dextrose 40% Gel 15 Gram(s) Oral once  dextrose 5%. 1000 milliLiter(s) (50 mL/Hr) IV Continuous <Continuous>  dextrose 5%. 1000 milliLiter(s) (100 mL/Hr) IV Continuous <Continuous>  dextrose 50% Injectable 25 Gram(s) IV Push once  dextrose 50% Injectable 12.5 Gram(s) IV Push once  dextrose 50% Injectable 25 Gram(s) IV Push once  glucagon  Injectable 1 milliGRAM(s) IntraMuscular once  hydrALAZINE Injectable 5 milliGRAM(s) IV Push every 6 hours  insulin lispro (ADMELOG) corrective regimen sliding scale   SubCutaneous three times a day before meals  insulin lispro (ADMELOG) corrective regimen sliding scale   SubCutaneous at bedtime  levETIRAcetam  IVPB 500 milliGRAM(s) IV Intermittent every 12 hours  piperacillin/tazobactam IVPB.. 3.375 Gram(s) IV Intermittent every 8 hours  sodium chloride 0.45%. 1000 milliLiter(s) (75 mL/Hr) IV Continuous <Continuous>  sodium chloride 0.9% lock flush 3 milliLiter(s) IV Push every 8 hours    MEDICATIONS  (PRN):      Allergies    No Known Allergies    Intolerances          Vital Signs Last 24 Hrs  T(C): 36.4 (14 Dec 2020 13:24), Max: 37.5 (13 Dec 2020 23:31)  T(F): 97.6 (14 Dec 2020 13:24), Max: 99.5 (13 Dec 2020 23:31)  HR: 88 (14 Dec 2020 13:24) (83 - 94)  BP: 141/81 (14 Dec 2020 13:24) (136/56 - 158/77)  BP(mean): --  RR: 16 (14 Dec 2020 13:24) (16 - 18)  SpO2: 100% (14 Dec 2020 13:24) (95% - 100%)  CAPILLARY BLOOD GLUCOSE      POCT Blood Glucose.: 117 mg/dL (14 Dec 2020 12:31)  POCT Blood Glucose.: 255 mg/dL (14 Dec 2020 09:02)  POCT Blood Glucose.: 200 mg/dL (13 Dec 2020 15:17)    I&O's Summary        PHYSICAL EXAM:  GENERAL: NAD, well-developed  HEAD:  AT, NC  EYES: EOMI, PERRLA, conjunctiva and sclera clear  ENMT: Airway patent. prominent macroglossia, no signs of stridor or respiratory distress.   NECK: Supple, No JVD, no lymphadenopathy  CHEST/LUNG: CTABL; No wheezing, rhonci, or rales  HEART: RRR; Normal S1, S2. No murmurs, rubs, or gallops  ABDOMEN: Scaphoid, Soft, NT, ND; Bowel sounds present. No organomegaly  : Estrella catheter inplace, draining clear urine  EXTREMITIES:  2+ Peripheral Pulses, No clubbing, cyanosis, or edema  NEUROLOGY: responds to name,   SKIN: Warm, dry, intact; No rashes or lesions      LABS:                        9.1    26.80 )-----------( 224      ( 14 Dec 2020 07:07 )             28.6     12-14    148<H>  |  109<H>  |  31<H>  ----------------------------<  257<H>  3.8   |  28  |  0.74    Ca    8.9      14 Dec 2020 06:43  Mg     2.1     12-14    TPro  6.7  /  Alb  2.8<L>  /  TBili  0.5  /  DBili  x   /  AST  14  /  ALT  16  /  AlkPhos  132<H>  12-13    LIVER FUNCTIONS - ( 13 Dec 2020 15:53 )  Alb: 2.8 g/dL / Pro: 6.7 g/dL / ALK PHOS: 132 U/L / ALT: 16 U/L / AST: 14 U/L / GGT: x           PT/INR - ( 14 Dec 2020 06:59 )   PT: 15.0 sec;   INR: 1.32 ratio               Urinalysis Basic - ( 13 Dec 2020 17:33 )    Color: Yellow / Appearance: Slightly Turbid / S.024 / pH: x  Gluc: x / Ketone: Negative  / Bili: Negative / Urobili: <2 mg/dL   Blood: x / Protein: 100 mg/dL / Nitrite: Negative   Leuk Esterase: Small / RBC: 6 /HPF / WBC 30 /HPF   Sq Epi: x / Non Sq Epi: 0 /HPF / Bacteria: Many          Culture - Blood (collected 13 Dec 2020 20:29)  Source: .Blood Blood  Gram Stain (14 Dec 2020 12:12):    Growth in aerobic bottle: Gram Positive Cocci in Clusters    Growth in anaerobic bottle: Gram Positive Cocci in Clusters  Preliminary Report (14 Dec 2020 12:12):    Growth in anaerobic bottle: Gram Positive Cocci in Clusters    Growth in aerobic bottle: Gram Positive Cocci in Clusters    "Due to technical problems, Proteus sp. will Not be reported as part of    the BCID panel until further notice"    ***Blood Panel PCR results on this specimen are available    approximately 3 hours after the Gram stain result.***    Gram stain, PCR, and/or culture results may not always    correspond due to difference in methodologies.    ************************************************************    This PCR assay was performed using [employer].    The following targets are tested for: Enterococcus,    vancomycin resistant enterococci, Listeria monocytogenes,    coagulase negative staphylococci, S. aureus,    methicillin resistant S. aureus, Streptococcus agalactiae    (Group B), S. pneumoniae, S. pyogenes (Group A),    Acinetobacter baumannii, Enterobacter cloacae, E. coli,    Klebsiella oxytoca, K. pneumoniae, Proteus sp.,    Serratia marcescens, Haemophilus influenzae,    Neisseria meningitidis, Pseudomonas aeruginosa, Candida    albicans, C. glabrata, C krusei, C parapsilosis,    C. tropicalis and the KPC resistance gene.  Organism: Blood Culture PCR (14 Dec 2020 12:25)  Organism: Blood Culture PCR (14 Dec 2020 12:25)    Culture - Blood (collected 13 Dec 2020 20:28)  Source: .Blood Blood  Gram Stain (14 Dec 2020 13:52):    Growth in aerobic bottle: Gram Positive Cocci in Clusters    Growth in anaerobic bottle: Gram Positive Cocci in Clusters  Preliminary Report (14 Dec 2020 13:53):    Growth in aerobic bottle: Gram Positive Cocci in Clusters    Growth in anaerobic bottle: Gram Positive Cocci in Clusters          RADIOLOGY & ADDITIONAL TESTS:    Imaging Personally Reviewed: YES    Consultant(s) Notes Reviewed: YES    Care Discussed with Consultants/Other Providers: YES

## 2020-12-14 NOTE — PROGRESS NOTE ADULT - PROBLEM SELECTOR PLAN 10
EKG: no acute changes;   c/w ASA, statin via PEG once confirmed  Restart antihypertensives once PEG confirmed

## 2020-12-14 NOTE — CONSULT NOTE ADULT - ASSESSMENT
89M, bedbound resident at  [facility] PMH of HTN, DM, h/o chronic ETOH abuse, ICH s/p fall (recently treated at [facility] on 11/3/20 for ICH s/p fall & DKA) started on Keppra for seizure ppx, dysphagia s/p PEG placement and chronic zamudio admitted for sepsis. Of note pt has a PICC line for unclear reasons. Pt found to be bacteremic, blood culture growing MRSA.    #MRSA bacteremia      #PENDING RECS. PLEASE WAIT FOR FINAL RECS AFTER DISCUSSION WITH ATTENDING# 89M, bedbound resident at  [facility] PMH of HTN, DM, h/o chronic ETOH abuse, ICH s/p fall (recently treated at [facility] on 11/3/20 for ICH s/p fall & DKA) started on Keppra for seizure ppx, dysphagia s/p PEG placement and chronic zamudio admitted for sepsis. Of note pt has a PICC line for unclear reasons. Pt found to be bacteremic, blood culture growing MRSA.    #MRSA bacteremia  - blood cultures growing MRSA x2 bottles 12/13  - continue vanc 500mg q12  - discontinue zosyn  - f/u vanc trough levels   - obtain TTE r/o endocarditis  - remove PICC line  - f/u wound care recs 89M, bedbound resident at  [facility] PMH of HTN, DM, h/o chronic ETOH abuse, ICH s/p fall (recently treated at [facility] on 11/3/20 for ICH s/p fall & DKA) started on Keppra for seizure ppx, dysphagia s/p PEG placement and chronic zamudio admitted for sepsis. Of note pt has a PICC line for unclear reasons. Pt found to be bacteremic, blood culture growing MRSA.    #MRSA bacteremia  - blood cultures growing MRSA x2 bottles 12/13  - continue vanc 500mg q12h  - discontinue zosyn  - repeat blood cultures in 48hrs  - f/u vanc trough levels   - obtain TTE r/o endocarditis  - remove PICC line  - f/u wound care recs

## 2020-12-14 NOTE — CONSULT NOTE ADULT - SUBJECTIVE AND OBJECTIVE BOX
Patient is a 89y old  Male who presents with a chief complaint of PEG tube dislodgement (14 Dec 2020 12:59)    HPI:  89 y/o white male, bedbound, resident at  [facility] with MHx significant for HTN, DM, [place], h/o chronic ETOH abuse, ICH s/p fall (recently treated at [facility] on 11/3/20 for ICH s/p fall & DKA) started on Keppra for seizure ppx and dysphagia s/p PEG placement and chronic zamudio. While at [facility] pt's PEG tube was dislodged s/p PEG replacement at [facility] transferred to [facility] to verify PEG tube placement. Patient is awake however with limited cognition, able to provide only simplified ROS, voices no complaints.     ED course: Zosyn IV & Vanco IV;  (13 Dec 2020 20:32)     Pt seen at bedside. Speech difficult to understand however reports no pain.     prior hospital charts reviewed [V]  primary team notes reviewed [V]  other consultant notes reviewed [V]    PAST MEDICAL & SURGICAL HISTORY:  ETOH abuse  chronic ETOH abuse    Dysphagia  s/p PEG placement    CAD (coronary artery disease)    HTN (hypertension)    DM2 (diabetes mellitus, type 2)    ICH (intracerebral hemorrhage)  s/p fall    No significant past surgical history      SOCIAL HISTORY:    - Denied smoking/vaping/alcohol/recreational drug use    FAMILY HISTORY:  No pertinent family history in first degree relatives      Allergies  No Known Allergies        ANTIMICROBIALS:  piperacillin/tazobactam IVPB.. 3.375 every 8 hours  vancomycin  IVPB        ANTIMICROBIALS (past 90 days):  MEDICATIONS  (STANDING):  azithromycin  IVPB   255 mL/Hr IV Intermittent (20 @ 01:51)    piperacillin/tazobactam IVPB..   25 mL/Hr IV Intermittent (20 @ 09:31)   25 mL/Hr IV Intermittent (20 @ 01:51)    piperacillin/tazobactam IVPB...   200 mL/Hr IV Intermittent (20 @ 18:54)    vancomycin  IVPB.   250 mL/Hr IV Intermittent (20 @ 20:11)        OTHER MEDS:   MEDICATIONS  (STANDING):  dextrose 40% Gel 15 once  dextrose 50% Injectable 25 once  dextrose 50% Injectable 12.5 once  dextrose 50% Injectable 25 once  glucagon  Injectable 1 once  hydrALAZINE Injectable 5 every 6 hours  insulin lispro (ADMELOG) corrective regimen sliding scale  three times a day before meals  insulin lispro (ADMELOG) corrective regimen sliding scale  at bedtime  levETIRAcetam  IVPB 500 every 12 hours      REVIEW OF SYSTEMS  [ x ] ROS unobtainable because:  unclear speech  [  ] All other systems negative except as noted below:	    Constitutional:  [ ] fever [ ] chills  [ ] weight loss  [ ] weakness  Skin:  [ ] rash [ ] phlebitis	  Eyes: [ ] icterus [ ] pain  [ ] discharge	  ENMT: [ ] sore throat  [ ] thrush [ ] ulcers [ ] exudates  Respiratory: [ ] dyspnea [ ] hemoptysis [ ] cough [ ] sputum	  Cardiovascular:  [ ] chest pain [ ] palpitations [ ] edema	  Gastrointestinal:  [ ] nausea [ ] vomiting [ ] diarrhea [ ] constipation [ ] pain	  Genitourinary:  [ ] dysuria [ ] frequency [ ] hematuria [ ] discharge [ ] flank pain  [ ] incontinence  Musculoskeletal:  [ ] myalgias [ ] arthralgias [ ] arthritis  [ ] back pain  Neurological:  [ ] headache [ ] seizures  [ ] confusion/altered mental status  Psychiatric:  [ ] anxiety [ ] depression	  Hematology/Lymphatics:  [ ] lymphadenopathy  Endocrine:  [ ] adrenal [ ] thyroid  Allergic/Immunologic:	 [ ] transplant [ ] seasonal    VITALS:  Vital Signs Last 24 Hrs  T(F): 97.6 (20 @ 13:24), Max: 99.5 (20 @ 23:31)    Vital Signs Last 24 Hrs  HR: 88 (20 @ 13:24) (83 - 94)  BP: 141/81 (20 @ 13:24) (136/56 - 158/77)  RR: 16 (20 @ 13:24)  SpO2: 100% (20 @ 13:24) (95% - 100%)  Wt(kg): --    EXAM:  GA: NAD, frail elderly male lying in bed comfortably  HEENT: oral cavity no lesion  CV: nl S1/S2, no RMG  Lungs: CTAB anteriorly  Abd: BS+, soft, nontender, no rebounding pain. PEG tube in place, no erythema.  Ext: no edema. No joint erythema, no TTP.   Skin: scab on R forearm. Multiple bruising on arms and legs.   PICC R arm w/o erythema, no TTP.     Labs:                        9.1    26.80 )-----------( 224      ( 14 Dec 2020 07:07 )             28.6         148<H>  |  109<H>  |  31<H>  ----------------------------<  257<H>  3.8   |  28  |  0.74    Ca    8.9      14 Dec 2020 06:43  Mg     2.1         TPro  6.7  /  Alb  2.8<L>  /  TBili  0.5  /  DBili  x   /  AST  14  /  ALT  16  /  AlkPhos  132<H>        WBC Trend:  WBC Count: 26.80 (20 @ 07:07)  WBC Count: 29.44 (20 @ 15:53)      Auto Neutrophil #: 26.38 K/uL (20 @ 15:53)  Band Neutrophils %: 6.1 % (20 @ 15:53)      Creatine Trend:  Creatinine, Serum: 0.74 ()  Creatinine, Serum: 0.77 ()      Liver Biochemical Testing Trend:  Alanine Aminotransferase (ALT/SGPT): 16 ()  Aspartate Aminotransferase (AST/SGOT): 14 (20 @ 15:53)  Bilirubin Total, Serum: 0.5 ()      Trend LDH      Auto Eosinophil %: 0.0 % (20 @ 15:53)      Urinalysis Basic - ( 13 Dec 2020 17:33 )    Color: Yellow / Appearance: Slightly Turbid / S.024 / pH: x  Gluc: x / Ketone: Negative  / Bili: Negative / Urobili: <2 mg/dL   Blood: x / Protein: 100 mg/dL / Nitrite: Negative   Leuk Esterase: Small / RBC: 6 /HPF / WBC 30 /HPF   Sq Epi: x / Non Sq Epi: 0 /HPF / Bacteria: Many        MICROBIOLOGY:      Culture - Blood (collected 13 Dec 2020 20:29)  Source: .Blood Blood  Preliminary Report:    Growth in anaerobic bottle: Gram Positive Cocci in Clusters    Growth in aerobic bottle: Gram Positive Cocci in Clusters    "Due to technical problems, Proteus sp. will Not be reported as part of    the BCID panel until further notice"    ***Blood Panel PCR results on this specimen are available    approximately 3 hours after the Gram stain result.***    Gram stain, PCR, and/or culture results may not always    correspond due to difference in methodologies.    ************************************************************    This PCR assay was performed using [employer].    The following targets are tested for: Enterococcus,    vancomycin resistant enterococci, Listeria monocytogenes,    coagulase negative staphylococci, S. aureus,    methicillin resistant S. aureus, Streptococcus agalactiae    (Group B), S. pneumoniae, S. pyogenes (Group A),    Acinetobacter baumannii, Enterobacter cloacae, E. coli,    Klebsiella oxytoca, K. pneumoniae, Proteus sp.,    Serratia marcescens, Haemophilus influenzae,    Neisseria meningitidis, Pseudomonas aeruginosa, Candida    albicans, C. glabrata, C krusei, C parapsilosis,    C. tropicalis and the KPC resistance gene.  Organism: Blood Culture PCR  Organism: Blood Culture PCR    Sensitivities:      -  Methicillin resistant Staphylococcus aureus (MRSA): Detec      Method Type: PCR    Culture - Blood (collected 13 Dec 2020 20:28)  Source: .Blood Blood  Preliminary Report:    Growth in aerobic bottle: Gram Positive Cocci in Clusters    Growth in anaerobic bottle: Gram Positive Cocci in Clusters    OTHER TESTS:  COVID-19 PCR: NotDetec (20 @ 15:50)      RADIOLOGY:  < from: Xray Abdomen 1 View PORTABLE -Routine (Xray Abdomen 1 View PORTABLE -Routine .) (20 @ 20:41) >    FINDINGS:  Markedly limited radiographs the abdomen. Contrast material seen in the gastrostomy tube, stomach and proximal small bowel.    No evidence of leakage. Degenerative changes of the osseous structures.    IMPRESSION:  Gastrostomy tube in place.    < end of copied text >    < from: Xray Chest 1 View- PORTABLE-Urgent (Xray Chest 1 View- PORTABLE-Urgent .) (20 @ 17:55) >  FINDINGS:    No PICC line is seen in this image.  Left retrocardiac opacity likely subsegmental atelectasis.  No pleural effusion or pneumothorax.  Heart size is within normal limits.  Noacute abnormality within visible osseous structures.    Pulmonary nodule measuring 3.0 cm in the left lower lung.  Nodular opacity in the right hemithorax measuring 1.3 cm, may be the patient's nipple versus a pulmonary nodule.      IMPRESSION:    1. Hazy airspace opacities in the left lower lung, which may represent pneumonia in appropriate clinical setting.  2.  Pulmonary nodule measuring 3.0 cm in the left lower lung. Further evaluation with noncontrast Chest CT can be obtained if clinically warranted.  3.  No PICC line visible.    < end of copied text >       Patient is a 89y old  Male who presents with a chief complaint of PEG tube dislodgement (14 Dec 2020 12:59)    HPI:  89 y/o white male, bedbound, resident at  [facility] with MHx significant for HTN, DM, [place], h/o chronic ETOH abuse, ICH s/p fall (recently treated at [facility] on 11/3/20 for ICH s/p fall & DKA) started on Keppra for seizure ppx and dysphagia s/p PEG placement and chronic zamudio. While at [facility] pt's PEG tube was dislodged s/p PEG replacement at [facility] transferred to [facility] to verify PEG tube placement. Patient is awake however with limited cognition, able to provide only simplified ROS, voices no complaints.     ED course: Zosyn IV & Vanco IV;  (13 Dec 2020 20:32)     Pt seen at bedside. Speech difficult to understand however reports no pain.     prior hospital charts reviewed [V]  primary team notes reviewed [V]  other consultant notes reviewed [V]    PAST MEDICAL & SURGICAL HISTORY:  ETOH abuse  chronic ETOH abuse    Dysphagia  s/p PEG placement    CAD (coronary artery disease)    HTN (hypertension)    DM2 (diabetes mellitus, type 2)    ICH (intracerebral hemorrhage)  s/p fall    No significant past surgical history      SOCIAL HISTORY:    - Denied smoking/vaping/alcohol/recreational drug use    FAMILY HISTORY:  No pertinent family history in first degree relatives      Allergies  No Known Allergies        ANTIMICROBIALS:  piperacillin/tazobactam IVPB.. 3.375 every 8 hours  vancomycin  IVPB        ANTIMICROBIALS (past 90 days):  MEDICATIONS  (STANDING):  azithromycin  IVPB   255 mL/Hr IV Intermittent (20 @ 01:51)    piperacillin/tazobactam IVPB..   25 mL/Hr IV Intermittent (20 @ 09:31)   25 mL/Hr IV Intermittent (20 @ 01:51)    piperacillin/tazobactam IVPB...   200 mL/Hr IV Intermittent (20 @ 18:54)    vancomycin  IVPB.   250 mL/Hr IV Intermittent (20 @ 20:11)        OTHER MEDS:   MEDICATIONS  (STANDING):  dextrose 40% Gel 15 once  dextrose 50% Injectable 25 once  dextrose 50% Injectable 12.5 once  dextrose 50% Injectable 25 once  glucagon  Injectable 1 once  hydrALAZINE Injectable 5 every 6 hours  insulin lispro (ADMELOG) corrective regimen sliding scale  three times a day before meals  insulin lispro (ADMELOG) corrective regimen sliding scale  at bedtime  levETIRAcetam  IVPB 500 every 12 hours      REVIEW OF SYSTEMS  [ x ] ROS unobtainable because:  unclear speech  [  ] All other systems negative except as noted below:	    Constitutional:  [ ] fever [ ] chills  [ ] weight loss  [ ] weakness  Skin:  [ ] rash [ ] phlebitis	  Eyes: [ ] icterus [ ] pain  [ ] discharge	  ENMT: [ ] sore throat  [ ] thrush [ ] ulcers [ ] exudates  Respiratory: [ ] dyspnea [ ] hemoptysis [ ] cough [ ] sputum	  Cardiovascular:  [ ] chest pain [ ] palpitations [ ] edema	  Gastrointestinal:  [ ] nausea [ ] vomiting [ ] diarrhea [ ] constipation [ ] pain	  Genitourinary:  [ ] dysuria [ ] frequency [ ] hematuria [ ] discharge [ ] flank pain  [ ] incontinence  Musculoskeletal:  [ ] myalgias [ ] arthralgias [ ] arthritis  [ ] back pain  Neurological:  [ ] headache [ ] seizures  [ ] confusion/altered mental status  Psychiatric:  [ ] anxiety [ ] depression	  Hematology/Lymphatics:  [ ] lymphadenopathy  Endocrine:  [ ] adrenal [ ] thyroid  Allergic/Immunologic:	 [ ] transplant [ ] seasonal    VITALS:  Vital Signs Last 24 Hrs  T(F): 97.6 (20 @ 13:24), Max: 99.5 (20 @ 23:31)    Vital Signs Last 24 Hrs  HR: 88 (20 @ 13:24) (83 - 94)  BP: 141/81 (20 @ 13:24) (136/56 - 158/77)  RR: 16 (20 @ 13:24)  SpO2: 100% (20 @ 13:24) (95% - 100%)  Wt(kg): --    EXAM:  GA: NAD, frail elderly male lying in bed comfortably  HEENT: oral cavity no lesion  CV: nl S1/S2, no RMG  Lungs: CTAB anteriorly  Abd: BS+, soft, nontender, no rebounding pain. PEG tube in place, no erythema.  Ext: no edema. No joint erythema, no TTP.   Skin: scab on R forearm. Multiple bruising on arms and legs. Sacral decub ulcer w/ erythema.   PICC R arm w/o erythema, no TTP.     Labs:                        9.1    26.80 )-----------( 224      ( 14 Dec 2020 07:07 )             28.6         148<H>  |  109<H>  |  31<H>  ----------------------------<  257<H>  3.8   |  28  |  0.74    Ca    8.9      14 Dec 2020 06:43  Mg     2.1         TPro  6.7  /  Alb  2.8<L>  /  TBili  0.5  /  DBili  x   /  AST  14  /  ALT  16  /  AlkPhos  132<H>        WBC Trend:  WBC Count: 26.80 (20 @ 07:07)  WBC Count: 29.44 (20 @ 15:53)      Auto Neutrophil #: 26.38 K/uL (20 @ 15:53)  Band Neutrophils %: 6.1 % (20 @ 15:53)      Creatine Trend:  Creatinine, Serum: 0.74 ()  Creatinine, Serum: 0.77 ()      Liver Biochemical Testing Trend:  Alanine Aminotransferase (ALT/SGPT): 16 ()  Aspartate Aminotransferase (AST/SGOT): 14 (20 @ 15:53)  Bilirubin Total, Serum: 0.5 ()      Trend LDH      Auto Eosinophil %: 0.0 % (20 @ 15:53)      Urinalysis Basic - ( 13 Dec 2020 17:33 )    Color: Yellow / Appearance: Slightly Turbid / S.024 / pH: x  Gluc: x / Ketone: Negative  / Bili: Negative / Urobili: <2 mg/dL   Blood: x / Protein: 100 mg/dL / Nitrite: Negative   Leuk Esterase: Small / RBC: 6 /HPF / WBC 30 /HPF   Sq Epi: x / Non Sq Epi: 0 /HPF / Bacteria: Many        MICROBIOLOGY:      Culture - Blood (collected 13 Dec 2020 20:29)  Source: .Blood Blood  Preliminary Report:    Growth in anaerobic bottle: Gram Positive Cocci in Clusters    Growth in aerobic bottle: Gram Positive Cocci in Clusters    "Due to technical problems, Proteus sp. will Not be reported as part of    the BCID panel until further notice"    ***Blood Panel PCR results on this specimen are available    approximately 3 hours after the Gram stain result.***    Gram stain, PCR, and/or culture results may not always    correspond due to difference in methodologies.    ************************************************************    This PCR assay was performed using [employer].    The following targets are tested for: Enterococcus,    vancomycin resistant enterococci, Listeria monocytogenes,    coagulase negative staphylococci, S. aureus,    methicillin resistant S. aureus, Streptococcus agalactiae    (Group B), S. pneumoniae, S. pyogenes (Group A),    Acinetobacter baumannii, Enterobacter cloacae, E. coli,    Klebsiella oxytoca, K. pneumoniae, Proteus sp.,    Serratia marcescens, Haemophilus influenzae,    Neisseria meningitidis, Pseudomonas aeruginosa, Candida    albicans, C. glabrata, C krusei, C parapsilosis,    C. tropicalis and the KPC resistance gene.  Organism: Blood Culture PCR  Organism: Blood Culture PCR    Sensitivities:      -  Methicillin resistant Staphylococcus aureus (MRSA): Detec      Method Type: PCR    Culture - Blood (collected 13 Dec 2020 20:28)  Source: .Blood Blood  Preliminary Report:    Growth in aerobic bottle: Gram Positive Cocci in Clusters    Growth in anaerobic bottle: Gram Positive Cocci in Clusters    OTHER TESTS:  COVID-19 PCR: NotDetec (20 @ 15:50)      RADIOLOGY:  < from: Xray Abdomen 1 View PORTABLE -Routine (Xray Abdomen 1 View PORTABLE -Routine .) (20 @ 20:41) >    FINDINGS:  Markedly limited radiographs the abdomen. Contrast material seen in the gastrostomy tube, stomach and proximal small bowel.    No evidence of leakage. Degenerative changes of the osseous structures.    IMPRESSION:  Gastrostomy tube in place.    < end of copied text >    < from: Xray Chest 1 View- PORTABLE-Urgent (Xray Chest 1 View- PORTABLE-Urgent .) (20 @ 17:55) >  FINDINGS:    No PICC line is seen in this image.  Left retrocardiac opacity likely subsegmental atelectasis.  No pleural effusion or pneumothorax.  Heart size is within normal limits.  Noacute abnormality within visible osseous structures.    Pulmonary nodule measuring 3.0 cm in the left lower lung.  Nodular opacity in the right hemithorax measuring 1.3 cm, may be the patient's nipple versus a pulmonary nodule.      IMPRESSION:    1. Hazy airspace opacities in the left lower lung, which may represent pneumonia in appropriate clinical setting.  2.  Pulmonary nodule measuring 3.0 cm in the left lower lung. Further evaluation with noncontrast Chest CT can be obtained if clinically warranted.  3.  No PICC line visible.    < end of copied text >

## 2020-12-14 NOTE — DIETITIAN INITIAL EVALUATION ADULT. - PERTINENT MEDS FT
MEDICATIONS  (STANDING):  dextrose 40% Gel 15 Gram(s) Oral once  dextrose 5%. 1000 milliLiter(s) (50 mL/Hr) IV Continuous <Continuous>  dextrose 5%. 1000 milliLiter(s) (100 mL/Hr) IV Continuous <Continuous>  dextrose 50% Injectable 25 Gram(s) IV Push once  dextrose 50% Injectable 12.5 Gram(s) IV Push once  dextrose 50% Injectable 25 Gram(s) IV Push once  glucagon  Injectable 1 milliGRAM(s) IntraMuscular once  hydrALAZINE Injectable 5 milliGRAM(s) IV Push every 6 hours  insulin lispro (ADMELOG) corrective regimen sliding scale   SubCutaneous three times a day before meals  insulin lispro (ADMELOG) corrective regimen sliding scale   SubCutaneous at bedtime  levETIRAcetam  IVPB 500 milliGRAM(s) IV Intermittent every 12 hours  piperacillin/tazobactam IVPB.. 3.375 Gram(s) IV Intermittent every 8 hours  sodium chloride 0.45%. 1000 milliLiter(s) (75 mL/Hr) IV Continuous <Continuous>  sodium chloride 0.9% lock flush 3 milliLiter(s) IV Push every 8 hours  vancomycin  IVPB      vancomycin  IVPB 500 milliGRAM(s) IV Intermittent once    MEDICATIONS  (PRN):

## 2020-12-14 NOTE — DIETITIAN INITIAL EVALUATION ADULT. - REASON FOR ADMISSION
89 y/o white male, bedbound, resident at  [facility] with MHx significant for HTN, DM, [place], h/o chronic ETOH abuse, ICH s/p fall (recently treated at [facility] on 11/3/20 for ICH s/p fall & DKA), dysphagia s/p PEG, chronic zamuido a/w PEG tube malfunction; Found to be septic likely due to complicated UTI r/o PNa; Unclear indication for existing PICC line vs PICC line malfunction; Found to be cachectic with FTT; blood cultures positive for MRSA in 2 bottles.

## 2020-12-14 NOTE — DIETITIAN INITIAL EVALUATION ADULT. - ADD RECOMMEND
1. Consider adding Multivitamin to meet 100% RDIs. 2. Monitor weights, labs, BM's, skin integrity, TF intake/tolerance. 2. Further adjustments to rate/volume/duration/free water provision of enteral feeds dependant on long term monitoring of patient's tolerance and needs. 3. RD remains available and will f/u per protocols, please re-consult RD as needed.  -- RD spoke with Team 7 of above recs.

## 2020-12-14 NOTE — DIETITIAN INITIAL EVALUATION ADULT. - OTHER INFO
Per chart review, PEG tube dislodged at [facility] then s/p PEG replacement at [facility], abdominal xray shows PEG is in place. Noted TF initiated today per MD order. Otherwise, no reports of GI distress (nausea/vomiting/diarrhea/constipation) at this time.     Skin: Unstageable Sacral Ulcer -- pending wound care consult.  Edema: none noted.

## 2020-12-14 NOTE — DIETITIAN INITIAL EVALUATION ADULT. - ENTERAL
Recommend continuous TF via PEG with Glucerna1.5 Davidson starts at 10ml/hr and increase 10ml q 8 hrs as tolerated to goal of 35ml/hr x24 hrs + No Carb Prosource x3 pkt daily (will provide a total of 1440kcal, 114g protein, 683ml free water, and 84% RDIs; ~36kcal/kg CBW and ~1.4g protein/kg IBW). Defer FWF to medical team.

## 2020-12-15 DIAGNOSIS — I61.9 NONTRAUMATIC INTRACEREBRAL HEMORRHAGE, UNSPECIFIED: ICD-10-CM

## 2020-12-15 DIAGNOSIS — Q38.2 MACROGLOSSIA: ICD-10-CM

## 2020-12-15 DIAGNOSIS — E11.9 TYPE 2 DIABETES MELLITUS WITHOUT COMPLICATIONS: ICD-10-CM

## 2020-12-15 DIAGNOSIS — E87.0 HYPEROSMOLALITY AND HYPERNATREMIA: ICD-10-CM

## 2020-12-15 DIAGNOSIS — Z02.9 ENCOUNTER FOR ADMINISTRATIVE EXAMINATIONS, UNSPECIFIED: ICD-10-CM

## 2020-12-15 DIAGNOSIS — A41.9 SEPSIS, UNSPECIFIED ORGANISM: ICD-10-CM

## 2020-12-15 LAB
-  AMIKACIN: SIGNIFICANT CHANGE UP
-  AMOXICILLIN/CLAVULANIC ACID: SIGNIFICANT CHANGE UP
-  AMPICILLIN/SULBACTAM: SIGNIFICANT CHANGE UP
-  AMPICILLIN: SIGNIFICANT CHANGE UP
-  AZTREONAM: SIGNIFICANT CHANGE UP
-  CEFAZOLIN: SIGNIFICANT CHANGE UP
-  CEFEPIME: SIGNIFICANT CHANGE UP
-  CEFOXITIN: SIGNIFICANT CHANGE UP
-  CEFTRIAXONE: SIGNIFICANT CHANGE UP
-  CIPROFLOXACIN: SIGNIFICANT CHANGE UP
-  ERTAPENEM: SIGNIFICANT CHANGE UP
-  GENTAMICIN: SIGNIFICANT CHANGE UP
-  IMIPENEM: SIGNIFICANT CHANGE UP
-  LEVOFLOXACIN: SIGNIFICANT CHANGE UP
-  MEROPENEM: SIGNIFICANT CHANGE UP
-  NITROFURANTOIN: SIGNIFICANT CHANGE UP
-  PIPERACILLIN/TAZOBACTAM: SIGNIFICANT CHANGE UP
-  TIGECYCLINE: SIGNIFICANT CHANGE UP
-  TOBRAMYCIN: SIGNIFICANT CHANGE UP
-  TRIMETHOPRIM/SULFAMETHOXAZOLE: SIGNIFICANT CHANGE UP
ADD ON TEST-SPECIMEN IN LAB: SIGNIFICANT CHANGE UP
ANION GAP SERPL CALC-SCNC: 11 MMOL/L — SIGNIFICANT CHANGE UP (ref 7–14)
BASOPHILS # BLD AUTO: 0.12 K/UL — SIGNIFICANT CHANGE UP (ref 0–0.2)
BASOPHILS NFR BLD AUTO: 0.5 % — SIGNIFICANT CHANGE UP (ref 0–2)
BUN SERPL-MCNC: 30 MG/DL — HIGH (ref 7–23)
CALCIUM SERPL-MCNC: 9.1 MG/DL — SIGNIFICANT CHANGE UP (ref 8.4–10.5)
CHLORIDE SERPL-SCNC: 110 MMOL/L — HIGH (ref 98–107)
CO2 SERPL-SCNC: 26 MMOL/L — SIGNIFICANT CHANGE UP (ref 22–31)
CREAT SERPL-MCNC: 0.77 MG/DL — SIGNIFICANT CHANGE UP (ref 0.5–1.3)
CULTURE RESULTS: SIGNIFICANT CHANGE UP
EOSINOPHIL # BLD AUTO: 0.2 K/UL — SIGNIFICANT CHANGE UP (ref 0–0.5)
EOSINOPHIL NFR BLD AUTO: 0.8 % — SIGNIFICANT CHANGE UP (ref 0–6)
GLUCOSE SERPL-MCNC: 224 MG/DL — HIGH (ref 70–99)
HCT VFR BLD CALC: 29.8 % — LOW (ref 39–50)
HGB BLD-MCNC: 9.2 G/DL — LOW (ref 13–17)
IANC: 20.93 K/UL — HIGH (ref 1.5–8.5)
IMM GRANULOCYTES NFR BLD AUTO: 1.4 % — SIGNIFICANT CHANGE UP (ref 0–1.5)
LYMPHOCYTES # BLD AUTO: 1.78 K/UL — SIGNIFICANT CHANGE UP (ref 1–3.3)
LYMPHOCYTES # BLD AUTO: 7.3 % — LOW (ref 13–44)
MAGNESIUM SERPL-MCNC: 2.2 MG/DL — SIGNIFICANT CHANGE UP (ref 1.6–2.6)
MCHC RBC-ENTMCNC: 30.9 GM/DL — LOW (ref 32–36)
MCHC RBC-ENTMCNC: 31.7 PG — SIGNIFICANT CHANGE UP (ref 27–34)
MCV RBC AUTO: 102.8 FL — HIGH (ref 80–100)
METHOD TYPE: SIGNIFICANT CHANGE UP
MONOCYTES # BLD AUTO: 1.1 K/UL — HIGH (ref 0–0.9)
MONOCYTES NFR BLD AUTO: 4.5 % — SIGNIFICANT CHANGE UP (ref 2–14)
NEUTROPHILS # BLD AUTO: 20.93 K/UL — HIGH (ref 1.8–7.4)
NEUTROPHILS NFR BLD AUTO: 85.5 % — HIGH (ref 43–77)
NRBC # BLD: 0 /100 WBCS — SIGNIFICANT CHANGE UP
NRBC # FLD: 0 K/UL — SIGNIFICANT CHANGE UP
ORGANISM # SPEC MICROSCOPIC CNT: SIGNIFICANT CHANGE UP
ORGANISM # SPEC MICROSCOPIC CNT: SIGNIFICANT CHANGE UP
PHOSPHATE SERPL-MCNC: 3.4 MG/DL — SIGNIFICANT CHANGE UP (ref 2.5–4.5)
PLATELET # BLD AUTO: 254 K/UL — SIGNIFICANT CHANGE UP (ref 150–400)
POTASSIUM SERPL-MCNC: 3.2 MMOL/L — LOW (ref 3.5–5.3)
POTASSIUM SERPL-SCNC: 3.2 MMOL/L — LOW (ref 3.5–5.3)
RBC # BLD: 2.9 M/UL — LOW (ref 4.2–5.8)
RBC # FLD: 15.9 % — HIGH (ref 10.3–14.5)
SODIUM SERPL-SCNC: 147 MMOL/L — HIGH (ref 135–145)
SPECIMEN SOURCE: SIGNIFICANT CHANGE UP
WBC # BLD: 24.48 K/UL — HIGH (ref 3.8–10.5)
WBC # FLD AUTO: 24.48 K/UL — HIGH (ref 3.8–10.5)

## 2020-12-15 PROCEDURE — 99232 SBSQ HOSP IP/OBS MODERATE 35: CPT | Mod: GC

## 2020-12-15 PROCEDURE — 99233 SBSQ HOSP IP/OBS HIGH 50: CPT

## 2020-12-15 RX ORDER — OLANZAPINE 15 MG/1
5 TABLET, FILM COATED ORAL ONCE
Refills: 0 | Status: DISCONTINUED | OUTPATIENT
Start: 2020-12-15 | End: 2020-12-15

## 2020-12-15 RX ORDER — OLANZAPINE 15 MG/1
2.5 TABLET, FILM COATED ORAL ONCE
Refills: 0 | Status: COMPLETED | OUTPATIENT
Start: 2020-12-15 | End: 2020-12-16

## 2020-12-15 RX ORDER — HALOPERIDOL DECANOATE 100 MG/ML
0.5 INJECTION INTRAMUSCULAR ONCE
Refills: 0 | Status: COMPLETED | OUTPATIENT
Start: 2020-12-15 | End: 2020-12-15

## 2020-12-15 RX ORDER — INSULIN LISPRO 100/ML
VIAL (ML) SUBCUTANEOUS EVERY 6 HOURS
Refills: 0 | Status: DISCONTINUED | OUTPATIENT
Start: 2020-12-15 | End: 2020-12-23

## 2020-12-15 RX ORDER — POTASSIUM CHLORIDE 20 MEQ
30 PACKET (EA) ORAL ONCE
Refills: 0 | Status: COMPLETED | OUTPATIENT
Start: 2020-12-15 | End: 2020-12-15

## 2020-12-15 RX ADMIN — PIPERACILLIN AND TAZOBACTAM 25 GRAM(S): 4; .5 INJECTION, POWDER, LYOPHILIZED, FOR SOLUTION INTRAVENOUS at 13:45

## 2020-12-15 RX ADMIN — Medication 5 MILLIGRAM(S): at 12:18

## 2020-12-15 RX ADMIN — HALOPERIDOL DECANOATE 0.5 MILLIGRAM(S): 100 INJECTION INTRAMUSCULAR at 22:51

## 2020-12-15 RX ADMIN — SODIUM CHLORIDE 3 MILLILITER(S): 9 INJECTION INTRAMUSCULAR; INTRAVENOUS; SUBCUTANEOUS at 13:01

## 2020-12-15 RX ADMIN — Medication 5 MILLIGRAM(S): at 17:46

## 2020-12-15 RX ADMIN — PIPERACILLIN AND TAZOBACTAM 25 GRAM(S): 4; .5 INJECTION, POWDER, LYOPHILIZED, FOR SOLUTION INTRAVENOUS at 22:50

## 2020-12-15 RX ADMIN — Medication 3: at 06:24

## 2020-12-15 RX ADMIN — LEVETIRACETAM 400 MILLIGRAM(S): 250 TABLET, FILM COATED ORAL at 01:11

## 2020-12-15 RX ADMIN — SODIUM CHLORIDE 3 MILLILITER(S): 9 INJECTION INTRAMUSCULAR; INTRAVENOUS; SUBCUTANEOUS at 22:56

## 2020-12-15 RX ADMIN — Medication 5 MILLIGRAM(S): at 06:20

## 2020-12-15 RX ADMIN — PIPERACILLIN AND TAZOBACTAM 25 GRAM(S): 4; .5 INJECTION, POWDER, LYOPHILIZED, FOR SOLUTION INTRAVENOUS at 07:36

## 2020-12-15 RX ADMIN — LEVETIRACETAM 400 MILLIGRAM(S): 250 TABLET, FILM COATED ORAL at 13:21

## 2020-12-15 RX ADMIN — Medication 3: at 19:38

## 2020-12-15 RX ADMIN — Medication 100 MILLIGRAM(S): at 06:20

## 2020-12-15 RX ADMIN — SODIUM CHLORIDE 3 MILLILITER(S): 9 INJECTION INTRAMUSCULAR; INTRAVENOUS; SUBCUTANEOUS at 06:27

## 2020-12-15 RX ADMIN — Medication 5 MILLIGRAM(S): at 01:06

## 2020-12-15 RX ADMIN — Medication 3: at 13:01

## 2020-12-15 RX ADMIN — Medication 30 MILLIEQUIVALENT(S): at 12:18

## 2020-12-15 RX ADMIN — HALOPERIDOL DECANOATE 0.5 MILLIGRAM(S): 100 INJECTION INTRAMUSCULAR at 10:03

## 2020-12-15 RX ADMIN — Medication 100 MILLIGRAM(S): at 17:45

## 2020-12-15 NOTE — ADVANCED PRACTICE NURSE CONSULT - RECOMMEDATIONS
Recommend follow up care at [facility]: [phone]. Address: [address] [place] [address].   Nutrition consult, patient with full thickness injury,    Topical recommendations:  Peritubular skin- Cleanse q shift with NS, apply liquid barrier film circumeferntially. Secondary securement to abdomen taping in 'H' fashion with Steri-strips.     Right forearm skin tear- Cleanse wound and periwound skin with NS, pat dry. Apply Liquid barrier film to periwound skin. Covere with silicone foam with border. Change every 3 days.    Sacrum- cleanse wound and periwound skin with SAF-clens, rinse with NS, pat dry. Apply Liquid barrier film to periwound skin and to bilateral buttock and perinanal area. Apply Medihoney hydrocolloid to wound base, cover with silicone foam with border. Change daily and prn with episodes of incontinence.    Apply sween 24 emollient to bilateral upper and lower extremities daily.    Continue low air loss bed therapy, continue heel elevation with Z-flex fluidized positioning boots, continue to turn & reposition q2h with Z-anais fluidized positioning device, soft pillow between bony prominences, continue use of single breathable pad, continue measures to decrease friction/shear/pressure.     Continue with nutritional support as per dietary/orders.    Findings and plan discussed with RN and primary team. All questions and concerns addressed.    Please contact Wound Care Service Line if we can be of further assistance (ext [phone]).

## 2020-12-15 NOTE — PROGRESS NOTE ADULT - ATTENDING COMMENTS
89M with MRSA bacteremia, likely CRBSI  -cont vancomycin  -cont zosyn for possible aspiration   -line removed  -f/u repeat bcx  -keep vanco trough 15-20    Amandeep Briceño  Attending Physician   Division of Infectious Disease  Pager #[phone]  After 5pm/weekend or no response, call #[phone]

## 2020-12-15 NOTE — PROGRESS NOTE ADULT - PROBLEM SELECTOR PLAN 3
Free water deficit of 0.5 L on 12/14  PEG tube feeds as per nutrition recs  Added 200ml of free water Q6  Trend BMP

## 2020-12-15 NOTE — PROGRESS NOTE ADULT - ASSESSMENT
89M, bedbound resident at  [facility] PMH of HTN, DM, h/o chronic ETOH abuse, ICH s/p fall (recently treated at [facility] on 11/3/20 for ICH s/p fall & DKA) started on Keppra for seizure ppx, dysphagia s/p PEG placement and chronic zamudio admitted for sepsis. Of note pt has a PICC line for unclear reasons. Pt found to be bacteremic, blood culture growing MRSA.    #MRSA bacteremia  - blood cultures growing MRSA x2 bottles 12/13, source likely PICC line  - continue vanc 500mg q12h  - discontinue zosyn  - repeat blood cultures in 48hrs  - f/u vanc trough levels   - obtain TTE r/o endocarditis  - favor removal of PICC line if not needed   - f/u wound care recs 89M, bedbound resident at  [facility] PMH of HTN, DM, h/o chronic ETOH abuse, ICH s/p fall (recently treated at [facility] on 11/3/20 for ICH s/p fall & DKA) started on Keppra for seizure ppx, dysphagia s/p PEG placement and chronic zamudio admitted for sepsis. Of note pt has a PICC line for unclear reasons. Pt found to be bacteremic, blood culture growing MRSA.    #MRSA bacteremia  - blood cultures growing MRSA x2 bottles 12/13, source likely PICC line    Plan:  - continue vanc 500mg q12h  - can continue zosyn for now pending CT chest r/o aspiration PNA  - repeat blood cultures in 48hrs  - f/u vanc trough levels   - obtain TTE r/o endocarditis  - f/u wound care recs

## 2020-12-15 NOTE — PROGRESS NOTE ADULT - ATTENDING COMMENTS
89 yo M w/ hx DM, [place], ICH s/p fall and DKA, Decubitus ulcer, chronic zamudio p/w dislodged PEG. PEG replaced and confirmed. On routine labs was found to have a  elevated WBC 28 prior WBC 8 1 week ago found to have LL PNA on Xray. Bcx- Gram positive cocci clusters PCR + MRSA   Sepsis secondary to UTI vs PNA vs line infection-repeat Bcx                                                - vanco/zosyn                                                - TTE eval for endocarditis                                                - CT chest to eval for PNA                                                 -? indication for PICC line/PIV removed                                                 - vanco trough prior to 4 th does                                                 - f/u Bcx/ IV line Cx                                                - case d/w Landen brother was at [facility] in [place] was transferred to [facility].   Decubitus Ulcer-sacral stage 3 wound care as per recs  protein calorie malnutrition -PEG tube in place and confirmed start Glucerna 35 ml/hr as per nutrition Hypernatrermia- free water 150 q 6 monitor Na  DM- FS q6 ISS check A1c   Prior ICH- cont keppra 89 yo M w/ hx DM, [place] [facility], ICH s/p fall and DKA, Decubitus ulcer, chronic zamuido p/w dislodged PEG. PEG replaced and confirmed. On routine labs was found to have a  elevated WBC 28 prior WBC 8 1 week ago found to have LL PNA on Xray. Bcx- Gram positive cocci clusters PCR + MRSA Ucx-Ecoli   Sepsis secondary to UTI vs PNA vs line infection-repeat Bcx                                                - vanco/zosyn                                                - TTE eval for endocarditis                                                - CT chest to eval for PNA                                                 -? indication for PICC line/PIV removed                                                 - vanco trough prior to 4 th does                                                 - f/u Bcx/ IV line Cx                                                - case d/w Landen brother was at [facility] in [place] was transferred to [facility].   Decubitus Ulcer-sacral stage 3 wound care as per recs  protein calorie malnutrition -PEG tube in place and confirmed start Glucerna 35 ml/hr as per nutrition Hypernatrermia- free water 150 q 6 monitor Na  DM- FS q6 ISS check A1c   Prior ICH- cont keppra

## 2020-12-15 NOTE — PROGRESS NOTE ADULT - ASSESSMENT
89 year old man history of DM, hard of hearing, traumatic ICH, sacral decubitus ulcer, chronic zamudio, presenting with dislodged PEG, lower left lobar pneumonia on xray, BCx positive for MRSA, UCx positive for Ecoli

## 2020-12-15 NOTE — PROGRESS NOTE ADULT - PROBLEM SELECTOR PLAN 6
tongue is enlarged, was noted to be same by grandson during prior admission  pending further collateral from outside facility regarding prior workup  no signs of airway compromise

## 2020-12-15 NOTE — ADVANCED PRACTICE NURSE CONSULT - ASSESSMENT
General: Alert, patient unable to answer questions appropriately, combative, primary RN and PCA at bedside during assessment. Patient cachetic, bedbound, thin-fragile skin with scattered purpura to upper extremities, intact stable scab to right forearm ([id number]). Pt with indwelling zamudio catheter in place, incontinent of stool. Skin warm and dry. Poor skin turgor, scattered areas of hyperpigmentation and hypopigmentation. Blanchable erythema on bilateral heels.     LUQ PEG in place- peritubular skin intact with dry sanguinous crust, unable to remove with cleansing (difficult to perform PEG care due to patient's agitation). Abdominal binder in place.    Right forearm- category 3 skin tear- irregular borders- [id number], 100% pale-pink friable dermis, small-moderate serosanguinous drainage, periwound skin with purpura and thin fragile skin.    Sacrum- patient turned to right side during assessment- Stage 3 pressure injury [id number]- 100% pale-pink adipose tissue with fibrin film, well defined irregular borders. Wound edges with maceration circumferentially, additionally there is purple-maroon discoloration along wound edge from 3- 7 o'clock extending 0.2cm. Scant serous drainage. Periwound skin with blanchable erythema circumferentially extending 1.5cm, no palpable skin changes, no induration, no increased warmth, no edema noted. Goals of care: decrease/manage bioburden, absorb/manage drainage (macerated wound edges), protect periwound skin.

## 2020-12-15 NOTE — PROGRESS NOTE ADULT - PROBLEM SELECTOR PLAN 1
Sepsis secondary to Midline infection, UTI, Pneumonia or sacral ulcer  Bcx positive for MRSA, Ucx with Ecoli, Xray with signs of Pneumonia  Vancomycin as per ID recs.  Midline removed and tip culture sent  TTE ordered for infective endocarditis  Urine is less likely to be the source of sepsis as per ID  Zosyn started in the ED will continue until can confirm no pneumonia on CT chest

## 2020-12-15 NOTE — PROGRESS NOTE ADULT - SUBJECTIVE AND OBJECTIVE BOX
Darnell Elkins MD PGY1   Team 7  Contact/Pager - [phone]      Patient is a 89y old  Male who presents with a chief complaint of PEG tube dislodgement (15 Dec 2020 09:39)        SUBJECTIVE / OVERNIGHT EVENTS: No events overnight. The patient was seen and examined at the bedside. He is awake, appears confused. He is unable to follow commands. He is unable to state his name. He states repeatedly "I want coffee".    Patient is unable to participate in ROS due to delirium.      MEDICATIONS  (STANDING):  chlorhexidine 4% Liquid 1 Application(s) Topical daily  dextrose 40% Gel 15 Gram(s) Oral once  dextrose 5%. 1000 milliLiter(s) (50 mL/Hr) IV Continuous <Continuous>  dextrose 5%. 1000 milliLiter(s) (100 mL/Hr) IV Continuous <Continuous>  dextrose 50% Injectable 25 Gram(s) IV Push once  dextrose 50% Injectable 12.5 Gram(s) IV Push once  dextrose 50% Injectable 25 Gram(s) IV Push once  glucagon  Injectable 1 milliGRAM(s) IntraMuscular once  hydrALAZINE Injectable 5 milliGRAM(s) IV Push every 6 hours  insulin lispro (ADMELOG) corrective regimen sliding scale   SubCutaneous every 6 hours  levETIRAcetam  IVPB 500 milliGRAM(s) IV Intermittent every 12 hours  piperacillin/tazobactam IVPB.. 3.375 Gram(s) IV Intermittent every 8 hours  sodium chloride 0.45%. 1000 milliLiter(s) (75 mL/Hr) IV Continuous <Continuous>  sodium chloride 0.9% lock flush 3 milliLiter(s) IV Push every 8 hours  vancomycin  IVPB      vancomycin  IVPB 500 milliGRAM(s) IV Intermittent every 12 hours    MEDICATIONS  (PRN):      Allergies    No Known Allergies    Intolerances          Vital Signs Last 24 Hrs  T(C): 36.4 (15 Dec 2020 11:21), Max: 36.7 (14 Dec 2020 17:00)  T(F): 97.5 (15 Dec 2020 11:21), Max: 98 (14 Dec 2020 17:00)  HR: 92 (15 Dec 2020 11:21) (87 - 98)  BP: 154/79 (15 Dec 2020 11:21) (134/57 - 154/79)  BP(mean): --  RR: 18 (15 Dec 2020 11:21) (16 - 18)  SpO2: 96% (15 Dec 2020 11:21) (96% - 100%)  CAPILLARY BLOOD GLUCOSE      POCT Blood Glucose.: 287 mg/dL (15 Dec 2020 12:23)  POCT Blood Glucose.: 253 mg/dL (15 Dec 2020 06:15)  POCT Blood Glucose.: 195 mg/dL (14 Dec 2020 22:02)  POCT Blood Glucose.: 195 mg/dL (14 Dec 2020 19:57)    I&O's Summary    14 Dec 2020 07:01  -  15 Dec 2020 07:00  --------------------------------------------------------  IN: 600 mL / OUT: 550 mL / NET: 50 mL          PHYSICAL EXAM:  GENERAL: agitated, frail elderly  HEAD:  AT, NC  EYES: EOMI, PERRLA, conjunctiva and sclera clear  ENMT: Airway patent. dry mucus membranes.   NECK: Supple, No JVD  CHEST/LUNG: CTABL; No wheezing, rhonci, or rales  HEART: RRR; Normal S1, S2. No murmurs, rubs, or gallops  ABDOMEN: Soft, NT, ND; Bowel sounds present. No organomegaly  EXTREMITIES:  2+ Peripheral Pulses, No clubbing, cyanosis, or edema  PSYCH: AAOx3  NEUROLOGY: non-focal  SKIN: Warm, dry, intact; No rashes or lesions      LABS:                        9.2    24.48 )-----------( 254      ( 15 Dec 2020 06:52 )             29.8     12-15    147<H>  |  110<H>  |  30<H>  ----------------------------<  224<H>  3.2<L>   |  26  |  0.77    Ca    9.1      15 Dec 2020 06:52  Phos  3.4     12-15  Mg     2.2     12-15    TPro  6.7  /  Alb  2.8<L>  /  TBili  0.5  /  DBili  x   /  AST  14  /  ALT  16  /  AlkPhos  132<H>  12-13    LIVER FUNCTIONS - ( 13 Dec 2020 15:53 )  Alb: 2.8 g/dL / Pro: 6.7 g/dL / ALK PHOS: 132 U/L / ALT: 16 U/L / AST: 14 U/L / GGT: x           PT/INR - ( 14 Dec 2020 06:59 )   PT: 15.0 sec;   INR: 1.32 ratio               Urinalysis Basic - ( 13 Dec 2020 17:33 )    Color: Yellow / Appearance: Slightly Turbid / S.024 / pH: x  Gluc: x / Ketone: Negative  / Bili: Negative / Urobili: <2 mg/dL   Blood: x / Protein: 100 mg/dL / Nitrite: Negative   Leuk Esterase: Small / RBC: 6 /HPF / WBC 30 /HPF   Sq Epi: x / Non Sq Epi: 0 /HPF / Bacteria: Many          Culture - Urine (collected 13 Dec 2020 23:18)  Source: .Urine Catheterized  Preliminary Report (14 Dec 2020 22:34):    >100,000 CFU/ml Escherichia coli    Culture - Blood (collected 13 Dec 2020 20:29)  Source: .Blood Blood  Gram Stain (14 Dec 2020 12:12):    Growth in aerobic bottle: Gram Positive Cocci in Clusters    Growth in anaerobic bottle: Gram Positive Cocci in Clusters  Preliminary Report (15 Dec 2020 10:58):    Growth in aerobic and anaerobic bottles: Staphylococcus aureus    "Due to technical problems, Proteus sp. will Not be reported as part of    the BCID panel until further notice"    ***Blood Panel PCR results on this specimen are available    approximately 3 hours after the Gram stain result.***    Gram stain, PCR, and/or culture results may not always    correspond due to difference in methodologies.    ************************************************************    This PCR assay was performed using [employer].    The following targets are tested for: Enterococcus,    vancomycin resistant enterococci, Listeria monocytogenes,    coagulase negative staphylococci, S. aureus,    methicillin resistant S. aureus, Streptococcus agalactiae    (Group B), S. pneumoniae, S. pyogenes (Group A),    Acinetobacter baumannii, Enterobacter cloacae, E. coli,    Klebsiella oxytoca, K. pneumoniae, Proteus sp.,    Serratia marcescens, Haemophilus influenzae,    Neisseria meningitidis, Pseudomonas aeruginosa, Candida    albicans, C. glabrata, C krusei, C parapsilosis,    C. tropicalis and the KPC resistance gene.  Organism: Blood Culture PCR (14 Dec 2020 12:25)  Organism: Blood Culture PCR (14 Dec 2020 12:25)    Culture - Blood (collected 13 Dec 2020 20:28)  Source: .Blood Blood  Gram Stain (14 Dec 2020 13:52):    Growth in aerobic bottle: Gram Positive Cocci in Clusters    Growth in anaerobic bottle: Gram Positive Cocci in Clusters  Preliminary Report (15 Dec 2020 11:30):    Growth in aerobic and anaerobic bottles: Staphylococcus aureus See    previous culture [id number]          RADIOLOGY & ADDITIONAL TESTS:    Imaging Personally Reviewed: YES    Consultant(s) Notes Reviewed: YES    Care Discussed with Consultants/Other Providers: YES     Darnell Elkins MD PGY1   Team 7  Contact/Pager - [phone]      Patient is a 89y old  Male who presents with a chief complaint of PEG tube dislodgement (15 Dec 2020 09:39)        SUBJECTIVE / OVERNIGHT EVENTS: No events overnight. The patient was seen and examined at the bedside. He is awake, appears confused. He is unable to follow commands. He is unable to state his name. He states repeatedly "I want coffee".    Attempted to reach the patient's brother Landen who is the patient's next of kin, but was disconnected and unable to reestablish contact. Further collateral obtained from the patient's grandson Yovani who reports that the patient was independent and able to attend to all ADLs prior to his intracerebral hemorrhage a couple months ago. As per Yovani the patient has not been the same since. The patient has been confused and may have trouble recognizing loved ones. Yovani had noted that the patient's allison was enlarged during his previous admission at [facility], but is unaware of any investigation into the cause. Yovani reports that the patient's brother Landen is not the patient's formal health care proxy but has been making decisions for him as his surrogate. There are no other family members involved in the patient's care.    Patient is unable to participate in ROS due to delirium.      MEDICATIONS  (STANDING):  chlorhexidine 4% Liquid 1 Application(s) Topical daily  dextrose 40% Gel 15 Gram(s) Oral once  dextrose 5%. 1000 milliLiter(s) (50 mL/Hr) IV Continuous <Continuous>  dextrose 5%. 1000 milliLiter(s) (100 mL/Hr) IV Continuous <Continuous>  dextrose 50% Injectable 25 Gram(s) IV Push once  dextrose 50% Injectable 12.5 Gram(s) IV Push once  dextrose 50% Injectable 25 Gram(s) IV Push once  glucagon  Injectable 1 milliGRAM(s) IntraMuscular once  hydrALAZINE Injectable 5 milliGRAM(s) IV Push every 6 hours  insulin lispro (ADMELOG) corrective regimen sliding scale   SubCutaneous every 6 hours  levETIRAcetam  IVPB 500 milliGRAM(s) IV Intermittent every 12 hours  piperacillin/tazobactam IVPB.. 3.375 Gram(s) IV Intermittent every 8 hours  sodium chloride 0.45%. 1000 milliLiter(s) (75 mL/Hr) IV Continuous <Continuous>  sodium chloride 0.9% lock flush 3 milliLiter(s) IV Push every 8 hours  vancomycin  IVPB      vancomycin  IVPB 500 milliGRAM(s) IV Intermittent every 12 hours    MEDICATIONS  (PRN):      Allergies    No Known Allergies    Intolerances          Vital Signs Last 24 Hrs  T(C): 36.4 (15 Dec 2020 11:21), Max: 36.7 (14 Dec 2020 17:00)  T(F): 97.5 (15 Dec 2020 11:), Max: 98 (14 Dec 2020 17:00)  HR: 92 (15 Dec 2020 11:) (87 - 98)  BP: 154/79 (15 Dec 2020 11:) (134/57 - 154/79)  BP(mean): --  RR: 18 (15 Dec 2020 11:) (16 - 18)  SpO2: 96% (15 Dec 2020 11:) (96% - 100%)  CAPILLARY BLOOD GLUCOSE      POCT Blood Glucose.: 287 mg/dL (15 Dec 2020 12:23)  POCT Blood Glucose.: 253 mg/dL (15 Dec 2020 06:15)  POCT Blood Glucose.: 195 mg/dL (14 Dec 2020 22:02)  POCT Blood Glucose.: 195 mg/dL (14 Dec 2020 19:57)    I&O's Summary    14 Dec 2020 07:01  -  15 Dec 2020 07:00  --------------------------------------------------------  IN: 600 mL / OUT: 550 mL / NET: 50 mL          PHYSICAL EXAM:  GENERAL: agitated, frail elderly  HEAD:  AT, NC  EYES: EOMI, PERRLA, conjunctiva and sclera clear  ENMT: Airway patent. dry mucus membranes, macroglossia  NECK: Supple, No JVD  CHEST/LUNG: CTABL; No wheezing, rhonchi, or rales  HEART: RRR; Normal S1, S2. No murmurs, rubs, or gallops  ABDOMEN: Scaphoid abdomen, Soft, NT, ND; Bowel sounds present. No organomegaly  EXTREMITIES:  2+ Peripheral Pulses, No clubbing, cyanosis, or edema  PSYCH: not oriented to person place or time  NEUROLOGY: non-focal  SKIN: sacral decubitus ulcer    LABS:                        9.2    24.48 )-----------( 254      ( 15 Dec 2020 06:52 )             29.8     12-15    147<H>  |  110<H>  |  30<H>  ----------------------------<  224<H>  3.2<L>   |  26  |  0.77    Ca    9.1      15 Dec 2020 06:52  Phos  3.4     12-15  Mg     2.2     12-15    TPro  6.7  /  Alb  2.8<L>  /  TBili  0.5  /  DBili  x   /  AST  14  /  ALT  16  /  AlkPhos  132<H>  12-    LIVER FUNCTIONS - ( 13 Dec 2020 15:53 )  Alb: 2.8 g/dL / Pro: 6.7 g/dL / ALK PHOS: 132 U/L / ALT: 16 U/L / AST: 14 U/L / GGT: x           PT/INR - ( 14 Dec 2020 06:59 )   PT: 15.0 sec;   INR: 1.32 ratio               Urinalysis Basic - ( 13 Dec 2020 17:33 )    Color: Yellow / Appearance: Slightly Turbid / S.024 / pH: x  Gluc: x / Ketone: Negative  / Bili: Negative / Urobili: <2 mg/dL   Blood: x / Protein: 100 mg/dL / Nitrite: Negative   Leuk Esterase: Small / RBC: 6 /HPF / WBC 30 /HPF   Sq Epi: x / Non Sq Epi: 0 /HPF / Bacteria: Many          Culture - Urine (collected 13 Dec 2020 23:18)  Source: .Urine Catheterized  Preliminary Report (14 Dec 2020 22:34):    >100,000 CFU/ml Escherichia coli    Culture - Blood (collected 13 Dec 2020 20:29)  Source: .Blood Blood  Gram Stain (14 Dec 2020 12:12):    Growth in aerobic bottle: Gram Positive Cocci in Clusters    Growth in anaerobic bottle: Gram Positive Cocci in Clusters  Preliminary Report (15 Dec 2020 10:58):    Growth in aerobic and anaerobic bottles: Staphylococcus aureus    "Due to technical problems, Proteus sp. will Not be reported as part of    the BCID panel until further notice"    ***Blood Panel PCR results on this specimen are available    approximately 3 hours after the Gram stain result.***    Gram stain, PCR, and/or culture results may not always    correspond due to difference in methodologies.    ************************************************************    This PCR assay was performed using [employer].    The following targets are tested for: Enterococcus,    vancomycin resistant enterococci, Listeria monocytogenes,    coagulase negative staphylococci, S. aureus,    methicillin resistant S. aureus, Streptococcus agalactiae    (Group B), S. pneumoniae, S. pyogenes (Group A),    Acinetobacter baumannii, Enterobacter cloacae, E. coli,    Klebsiella oxytoca, K. pneumoniae, Proteus sp.,    Serratia marcescens, Haemophilus influenzae,    Neisseria meningitidis, Pseudomonas aeruginosa, Candida    albicans, C. glabrata, C krusei, C parapsilosis,    C. tropicalis and the KPC resistance gene.  Organism: Blood Culture PCR (14 Dec 2020 12:25)  Organism: Blood Culture PCR (14 Dec 2020 12:25)    Culture - Blood (collected 13 Dec 2020 20:28)  Source: .Blood Blood  Gram Stain (14 Dec 2020 13:52):    Growth in aerobic bottle: Gram Positive Cocci in Clusters    Growth in anaerobic bottle: Gram Positive Cocci in Clusters  Preliminary Report (15 Dec 2020 11:30):    Growth in aerobic and anaerobic bottles: Staphylococcus aureus See    previous culture [id number]          RADIOLOGY & ADDITIONAL TESTS:    Imaging Personally Reviewed: YES    Consultant(s) Notes Reviewed: YES    Care Discussed with Consultants/Other Providers: YES

## 2020-12-15 NOTE — ADVANCED PRACTICE NURSE CONSULT - REASON FOR CONSULT
Patient seen on skin care rounds after wound care referral received for assessment of skin impairment and recommendations of topical management. Chart reviewed: Pt bedbound resident at [facility] of HTN, DM, h/o chronic ETOH abuse, ICH s/p fall (recently treated at [facility] on 11/3/20 for ICH s/p fall & DKA) started on Keppra for seizure, dysphagia s/p PEG placement and chronic zamudio admitted for sepsis. Of note pt has a PICC line for unclear reasons. Pt found to be bacteremic, blood culture growing MRSA. Labs reviewed WBC 24.48k/uL, H/H 9.2/29.8, Plt 254, INR 1.32, BMI 11.8 kg/m2, Ron 14.  Patient seen and followed by Infectious Disease for MRSA bacteremia.

## 2020-12-15 NOTE — PROGRESS NOTE ADULT - SUBJECTIVE AND OBJECTIVE BOX
Follow Up:      Interval History/ROS:Patient is a 89y old  Male who presents with a chief complaint of PEG tube dislodgement (15 Dec 2020 06:47)      Allergies  No Known Allergies        ANTIMICROBIALS:    piperacillin/tazobactam IVPB.. 3.375 every 8 hours  vancomycin  IVPB    vancomycin  IVPB 500 every 12 hours      OTHER MEDS: MEDICATIONS  (STANDING):  dextrose 40% Gel 15 once  dextrose 50% Injectable 25 once  dextrose 50% Injectable 12.5 once  dextrose 50% Injectable 25 once  glucagon  Injectable 1 once  haloperidol    Injectable 0.5 once PRN  hydrALAZINE Injectable 5 every 6 hours  insulin lispro (ADMELOG) corrective regimen sliding scale  every 6 hours  levETIRAcetam  IVPB 500 every 12 hours      Vital Signs Last 24 Hrs  T(F): 97.8 (20 @ 21:11), Max: 99.5 (20 @ 23:31)    Vital Signs Last 24 Hrs  HR: 92 (12-15-20 @ 05:05) (87 - 98)  BP: 134/57 (12-15-20 @ 05:05) (134/57 - 146/67)  RR: 17 (12-15-20 @ 05:05)  SpO2: 98% (12-15-20 @ 05:05) (97% - 100%)  Wt(kg): --    EXAM:  GA: NAD  HEENT: oral cavity no lesion  CV: nl S1/S2, no RMG  Lungs: CTAB  Abd: BS+, soft, nontender, no rebounding pain  Ext: no edema  Skin:  IV: no phlebitis    Labs:                        9.2    24.48 )-----------( 254      ( 15 Dec 2020 06:52 )             29.8     12-15    147<H>  |  110<H>  |  30<H>  ----------------------------<  224<H>  3.2<L>   |  26  |  0.77    Ca    9.1      15 Dec 2020 06:52  Phos  3.4     12-15  Mg     2.2     -15    TPro  6.7  /  Alb  2.8<L>  /  TBili  0.5  /  DBili  x   /  AST  14  /  ALT  16  /  AlkPhos  132<H>        WBC Trend:  WBC Count: 24.48 (12-15-20 @ 06:52)  WBC Count: 26.80 (20 @ 07:07)  WBC Count: 29.44 (20 @ 15:53)      Creatine Trend:  Creatinine, Serum: 0.77 (12-15)  Creatinine, Serum: 0.74 ()  Creatinine, Serum: 0.77 ()      Liver Biochemical Testing Trend:  Alanine Aminotransferase (ALT/SGPT): 16 ()  Aspartate Aminotransferase (AST/SGOT): 14 (20 @ 15:53)  Bilirubin Total, Serum: 0.5 ()      Trend LDH      Urinalysis Basic - ( 13 Dec 2020 17:33 )    Color: Yellow / Appearance: Slightly Turbid / S.024 / pH: x  Gluc: x / Ketone: Negative  / Bili: Negative / Urobili: <2 mg/dL   Blood: x / Protein: 100 mg/dL / Nitrite: Negative   Leuk Esterase: Small / RBC: 6 /HPF / WBC 30 /HPF   Sq Epi: x / Non Sq Epi: 0 /HPF / Bacteria: Many        MICROBIOLOGY:      Culture - Urine (collected 13 Dec 2020 23:18)  Source: .Urine Catheterized  Preliminary Report:    >100,000 CFU/ml Escherichia coli    Culture - Blood (collected 13 Dec 2020 20:29)  Source: .Blood Blood  Preliminary Report:    Growth in anaerobic bottle: Gram Positive Cocci in Clusters    Growth in aerobic bottle: Gram Positive Cocci in Clusters    "Due to technical problems, Proteus sp. will Not be reported as part of    the BCID panel until further notice"    ***Blood Panel PCR results on this specimen are available    approximately 3 hours after the Gram stain result.***    Gram stain, PCR, and/or culture results may not always    correspond due to difference in methodologies.    ************************************************************    This PCR assay was performed using [employer].    The following targets are tested for: Enterococcus,    vancomycin resistant enterococci, Listeria monocytogenes,    coagulase negative staphylococci, S. aureus,    methicillin resistant S. aureus, Streptococcus agalactiae    (Group B), S. pneumoniae, S. pyogenes (Group A),    Acinetobacter baumannii, Enterobacter cloacae, E. coli,    Klebsiella oxytoca, K. pneumoniae, Proteus sp.,    Serratia marcescens, Haemophilus influenzae,    Neisseria meningitidis, Pseudomonas aeruginosa, Candida    albicans, C. glabrata, C krusei, C parapsilosis,    C. tropicalis and the KPC resistance gene.  Organism: Blood Culture PCR  Organism: Blood Culture PCR    Sensitivities:      -  Methicillin resistant Staphylococcus aureus (MRSA): Detec      Method Type: PCR    Culture - Blood (collected 13 Dec 2020 20:28)  Source: .Blood Blood  Preliminary Report:    Growth in aerobic bottle: Gram Positive Cocci in Clusters    Growth in anaerobic bottle: Gram Positive Cocci in Clusters      Legionella Antigen, Urine: Negative ( @ 12:17)      OTHER TESTS:  COVID-19 PCR: NotDetec (20 @ 15:50)    RADIOLOGY:  imaging below personally reviewed   Follow Up:      Interval History/ROS:Patient is a 89y old  Male who presents with a chief complaint of PEG tube dislodgement (15 Dec 2020 06:47)  Pt seen at bedside, not in any pain. Difficult to understand speech.    Allergies  No Known Allergies      ANTIMICROBIALS:    piperacillin/tazobactam IVPB.. 3.375 every 8 hours  vancomycin  IVPB    vancomycin  IVPB 500 every 12 hours      OTHER MEDS: MEDICATIONS  (STANDING):  dextrose 40% Gel 15 once  dextrose 50% Injectable 25 once  dextrose 50% Injectable 12.5 once  dextrose 50% Injectable 25 once  glucagon  Injectable 1 once  haloperidol    Injectable 0.5 once PRN  hydrALAZINE Injectable 5 every 6 hours  insulin lispro (ADMELOG) corrective regimen sliding scale  every 6 hours  levETIRAcetam  IVPB 500 every 12 hours      Vital Signs Last 24 Hrs  T(F): 97.8 (20 @ 21:11), Max: 99.5 (20 @ 23:31)    Vital Signs Last 24 Hrs  HR: 92 (12-15-20 @ 05:05) (87 - 98)  BP: 134/57 (12-15-20 @ 05:05) (134/57 - 146/67)  RR: 17 (12-15-20 @ 05:05)  SpO2: 98% (12-15-20 @ 05:05) (97% - 100%)  Wt(kg): --    EXAM:  GA: NAD, frail elderly male lying in bed comfortably  HEENT: oral cavity no lesion  CV: nl S1/S2, no RMG  Lungs: CTAB anteriorly  Abd: BS+, soft, nontender, no rebounding pain. PEG tube in place, no erythema.  Ext: no edema. No joint erythema, no TTP.   Skin: scab on R forearm. Multiple bruising on arms and legs. Sacral decub ulcer w/ erythema.   PICC R arm w/o erythema, no TTP.     Labs:                        9.2    24.48 )-----------( 254      ( 15 Dec 2020 06:52 )             29.8     12-15    147<H>  |  110<H>  |  30<H>  ----------------------------<  224<H>  3.2<L>   |  26  |  0.77    Ca    9.1      15 Dec 2020 06:52  Phos  3.4     12-15  Mg     2.2     12-15    TPro  6.7  /  Alb  2.8<L>  /  TBili  0.5  /  DBili  x   /  AST  14  /  ALT  16  /  AlkPhos  132<H>        WBC Trend:  WBC Count: 24.48 (12-15-20 @ 06:52)  WBC Count: 26.80 (20 @ 07:07)  WBC Count: 29.44 (20 @ 15:53)      Creatine Trend:  Creatinine, Serum: 0.77 (12-15)  Creatinine, Serum: 0.74 ()  Creatinine, Serum: 0.77 ()      Liver Biochemical Testing Trend:  Alanine Aminotransferase (ALT/SGPT): 16 ()  Aspartate Aminotransferase (AST/SGOT): 14 (20 @ 15:53)  Bilirubin Total, Serum: 0.5 ()      Trend LDH      Urinalysis Basic - ( 13 Dec 2020 17:33 )    Color: Yellow / Appearance: Slightly Turbid / S.024 / pH: x  Gluc: x / Ketone: Negative  / Bili: Negative / Urobili: <2 mg/dL   Blood: x / Protein: 100 mg/dL / Nitrite: Negative   Leuk Esterase: Small / RBC: 6 /HPF / WBC 30 /HPF   Sq Epi: x / Non Sq Epi: 0 /HPF / Bacteria: Many        MICROBIOLOGY:      Culture - Urine (collected 13 Dec 2020 23:18)  Source: .Urine Catheterized  Preliminary Report:    >100,000 CFU/ml Escherichia coli    Culture - Blood (collected 13 Dec 2020 20:29)  Source: .Blood Blood  Preliminary Report:    Growth in anaerobic bottle: Gram Positive Cocci in Clusters    Growth in aerobic bottle: Gram Positive Cocci in Clusters    "Due to technical problems, Proteus sp. will Not be reported as part of    the BCID panel until further notice"    ***Blood Panel PCR results on this specimen are available    approximately 3 hours after the Gram stain result.***    Gram stain, PCR, and/or culture results may not always    correspond due to difference in methodologies.    ************************************************************    This PCR assay was performed using [employer].    The following targets are tested for: Enterococcus,    vancomycin resistant enterococci, Listeria monocytogenes,    coagulase negative staphylococci, S. aureus,    methicillin resistant S. aureus, Streptococcus agalactiae    (Group B), S. pneumoniae, S. pyogenes (Group A),    Acinetobacter baumannii, Enterobacter cloacae, E. coli,    Klebsiella oxytoca, K. pneumoniae, Proteus sp.,    Serratia marcescens, Haemophilus influenzae,    Neisseria meningitidis, Pseudomonas aeruginosa, Candida    albicans, C. glabrata, C krusei, C parapsilosis,    C. tropicalis and the KPC resistance gene.  Organism: Blood Culture PCR  Organism: Blood Culture PCR    Sensitivities:      -  Methicillin resistant Staphylococcus aureus (MRSA): Detec      Method Type: PCR    Culture - Blood (collected 13 Dec 2020 20:28)  Source: .Blood Blood  Preliminary Report:    Growth in aerobic bottle: Gram Positive Cocci in Clusters    Growth in anaerobic bottle: Gram Positive Cocci in Clusters      Legionella Antigen, Urine: Negative ( @ 12:17)      OTHER TESTS:  COVID-19 PCR: NotDetec (20 @ 15:50)    RADIOLOGY:  imaging below personally reviewed   Follow Up:      Interval History/ROS:Patient is a 89y old  Male who presents with a chief complaint of PEG tube dislodgement (15 Dec 2020 06:47)  Pt seen at bedside, not in any pain. Difficult to understand speech. PICC line removed.    Allergies  No Known Allergies      ANTIMICROBIALS:    piperacillin/tazobactam IVPB.. 3.375 every 8 hours  vancomycin  IVPB    vancomycin  IVPB 500 every 12 hours      OTHER MEDS: MEDICATIONS  (STANDING):  dextrose 40% Gel 15 once  dextrose 50% Injectable 25 once  dextrose 50% Injectable 12.5 once  dextrose 50% Injectable 25 once  glucagon  Injectable 1 once  haloperidol    Injectable 0.5 once PRN  hydrALAZINE Injectable 5 every 6 hours  insulin lispro (ADMELOG) corrective regimen sliding scale  every 6 hours  levETIRAcetam  IVPB 500 every 12 hours      Vital Signs Last 24 Hrs  T(F): 97.8 (20 @ 21:11), Max: 99.5 (20 @ 23:31)    Vital Signs Last 24 Hrs  HR: 92 (12-15-20 @ 05:05) (87 - 98)  BP: 134/57 (12-15-20 @ 05:05) (134/57 - 146/67)  RR: 17 (12-15-20 @ 05:05)  SpO2: 98% (12-15-20 @ 05:05) (97% - 100%)  Wt(kg): --    EXAM:  GA: NAD, frail elderly male lying in bed comfortably  HEENT: oral cavity no lesion  CV: nl S1/S2, no RMG  Lungs: CTAB anteriorly  Abd: BS+, soft, nontender, no rebounding pain. PEG tube in place, no erythema.  Ext: no edema. No joint erythema, no TTP.   Skin: scab on R forearm. Multiple bruising on arms and legs. Sacral decub ulcer w/ erythema.     Labs:                        9.2    24.48 )-----------( 254      ( 15 Dec 2020 06:52 )             29.8     12-15    147<H>  |  110<H>  |  30<H>  ----------------------------<  224<H>  3.2<L>   |  26  |  0.77    Ca    9.1      15 Dec 2020 06:52  Phos  3.4     12-15  Mg     2.2     12-15    TPro  6.7  /  Alb  2.8<L>  /  TBili  0.5  /  DBili  x   /  AST  14  /  ALT  16  /  AlkPhos  132<H>        WBC Trend:  WBC Count: 24.48 (12-15-20 @ 06:52)  WBC Count: 26.80 (20 @ 07:07)  WBC Count: 29.44 (20 @ 15:53)      Creatine Trend:  Creatinine, Serum: 0.77 (12-15)  Creatinine, Serum: 0.74 ()  Creatinine, Serum: 0.77 ()      Liver Biochemical Testing Trend:  Alanine Aminotransferase (ALT/SGPT): 16 ()  Aspartate Aminotransferase (AST/SGOT): 14 (20 @ 15:53)  Bilirubin Total, Serum: 0.5 ()      Trend LDH      Urinalysis Basic - ( 13 Dec 2020 17:33 )    Color: Yellow / Appearance: Slightly Turbid / S.024 / pH: x  Gluc: x / Ketone: Negative  / Bili: Negative / Urobili: <2 mg/dL   Blood: x / Protein: 100 mg/dL / Nitrite: Negative   Leuk Esterase: Small / RBC: 6 /HPF / WBC 30 /HPF   Sq Epi: x / Non Sq Epi: 0 /HPF / Bacteria: Many        MICROBIOLOGY:      Culture - Urine (collected 13 Dec 2020 23:18)  Source: .Urine Catheterized  Preliminary Report:    >100,000 CFU/ml Escherichia coli    Culture - Blood (collected 13 Dec 2020 20:29)  Source: .Blood Blood  Preliminary Report:    Growth in anaerobic bottle: Gram Positive Cocci in Clusters    Growth in aerobic bottle: Gram Positive Cocci in Clusters    "Due to technical problems, Proteus sp. will Not be reported as part of    the BCID panel until further notice"    ***Blood Panel PCR results on this specimen are available    approximately 3 hours after the Gram stain result.***    Gram stain, PCR, and/or culture results may not always    correspond due to difference in methodologies.    ************************************************************    This PCR assay was performed using [email or web address].    The following targets are tested for: Enterococcus,    vancomycin resistant enterococci, Listeria monocytogenes,    coagulase negative staphylococci, S. aureus,    methicillin resistant S. aureus, Streptococcus agalactiae    (Group B), S. pneumoniae, S. pyogenes (Group A),    Acinetobacter baumannii, Enterobacter cloacae, E. coli,    Klebsiella oxytoca, K. pneumoniae, Proteus sp.,    Serratia marcescens, Haemophilus influenzae,    Neisseria meningitidis, Pseudomonas aeruginosa, Candida    albicans, C. glabrata, C krusei, C parapsilosis,    C. tropicalis and the KPC resistance gene.  Organism: Blood Culture PCR  Organism: Blood Culture PCR    Sensitivities:      -  Methicillin resistant Staphylococcus aureus (MRSA): Detec      Method Type: PCR    Culture - Blood (collected 13 Dec 2020 20:28)  Source: .Blood Blood  Preliminary Report:    Growth in aerobic bottle: Gram Positive Cocci in Clusters    Growth in anaerobic bottle: Gram Positive Cocci in Clusters      Legionella Antigen, Urine: Negative ( @ 12:17)      OTHER TESTS:  COVID-19 PCR: NotDetec (20 @ 15:50)    RADIOLOGY:  imaging below personally reviewed

## 2020-12-16 LAB
-  AMPICILLIN/SULBACTAM: SIGNIFICANT CHANGE UP
-  CEFAZOLIN: SIGNIFICANT CHANGE UP
-  CLINDAMYCIN: SIGNIFICANT CHANGE UP
-  DAPTOMYCIN: SIGNIFICANT CHANGE UP
-  ERYTHROMYCIN: SIGNIFICANT CHANGE UP
-  GENTAMICIN: SIGNIFICANT CHANGE UP
-  LINEZOLID: SIGNIFICANT CHANGE UP
-  OXACILLIN: SIGNIFICANT CHANGE UP
-  PENICILLIN: SIGNIFICANT CHANGE UP
-  RIFAMPIN: SIGNIFICANT CHANGE UP
-  TETRACYCLINE: SIGNIFICANT CHANGE UP
-  TRIMETHOPRIM/SULFAMETHOXAZOLE: SIGNIFICANT CHANGE UP
-  VANCOMYCIN: SIGNIFICANT CHANGE UP
ADD ON TEST-SPECIMEN IN LAB: SIGNIFICANT CHANGE UP
ALBUMIN SERPL ELPH-MCNC: 2.4 G/DL — LOW (ref 3.3–5)
ALP SERPL-CCNC: 118 U/L — SIGNIFICANT CHANGE UP (ref 40–120)
ALT FLD-CCNC: 16 U/L — SIGNIFICANT CHANGE UP (ref 4–41)
ANION GAP SERPL CALC-SCNC: 8 MMOL/L — SIGNIFICANT CHANGE UP (ref 7–14)
AST SERPL-CCNC: 11 U/L — SIGNIFICANT CHANGE UP (ref 4–40)
BILIRUB SERPL-MCNC: 0.4 MG/DL — SIGNIFICANT CHANGE UP (ref 0.2–1.2)
BUN SERPL-MCNC: 32 MG/DL — HIGH (ref 7–23)
CALCIUM SERPL-MCNC: 8.9 MG/DL — SIGNIFICANT CHANGE UP (ref 8.4–10.5)
CHLORIDE SERPL-SCNC: 113 MMOL/L — HIGH (ref 98–107)
CO2 SERPL-SCNC: 27 MMOL/L — SIGNIFICANT CHANGE UP (ref 22–31)
CREAT SERPL-MCNC: 0.79 MG/DL — SIGNIFICANT CHANGE UP (ref 0.5–1.3)
CULTURE RESULTS: NO GROWTH — SIGNIFICANT CHANGE UP
CULTURE RESULTS: SIGNIFICANT CHANGE UP
GLUCOSE SERPL-MCNC: 235 MG/DL — HIGH (ref 70–99)
GRAM STN FLD: SIGNIFICANT CHANGE UP
HCT VFR BLD CALC: 28.3 % — LOW (ref 39–50)
HGB BLD-MCNC: 8.9 G/DL — LOW (ref 13–17)
MCHC RBC-ENTMCNC: 31.4 GM/DL — LOW (ref 32–36)
MCHC RBC-ENTMCNC: 31.8 PG — SIGNIFICANT CHANGE UP (ref 27–34)
MCV RBC AUTO: 101.1 FL — HIGH (ref 80–100)
METHOD TYPE: SIGNIFICANT CHANGE UP
NRBC # BLD: 0 /100 WBCS — SIGNIFICANT CHANGE UP
NRBC # FLD: 0 K/UL — SIGNIFICANT CHANGE UP
ORGANISM # SPEC MICROSCOPIC CNT: SIGNIFICANT CHANGE UP
PLATELET # BLD AUTO: 276 K/UL — SIGNIFICANT CHANGE UP (ref 150–400)
POTASSIUM SERPL-MCNC: 3.4 MMOL/L — LOW (ref 3.5–5.3)
POTASSIUM SERPL-SCNC: 3.4 MMOL/L — LOW (ref 3.5–5.3)
PROT SERPL-MCNC: 6.4 G/DL — SIGNIFICANT CHANGE UP (ref 6–8.3)
RBC # BLD: 2.8 M/UL — LOW (ref 4.2–5.8)
RBC # FLD: 16 % — HIGH (ref 10.3–14.5)
SODIUM SERPL-SCNC: 148 MMOL/L — HIGH (ref 135–145)
SPECIMEN SOURCE: SIGNIFICANT CHANGE UP
VANCOMYCIN TROUGH SERPL-MCNC: 10.5 UG/ML — SIGNIFICANT CHANGE UP (ref 10–20)
WBC # BLD: 22.4 K/UL — HIGH (ref 3.8–10.5)
WBC # FLD AUTO: 22.4 K/UL — HIGH (ref 3.8–10.5)

## 2020-12-16 PROCEDURE — 71250 CT THORAX DX C-: CPT | Mod: 26

## 2020-12-16 PROCEDURE — 99232 SBSQ HOSP IP/OBS MODERATE 35: CPT | Mod: GC

## 2020-12-16 PROCEDURE — 99233 SBSQ HOSP IP/OBS HIGH 50: CPT | Mod: GC

## 2020-12-16 PROCEDURE — 93306 TTE W/DOPPLER COMPLETE: CPT | Mod: 26

## 2020-12-16 PROCEDURE — 93010 ELECTROCARDIOGRAM REPORT: CPT

## 2020-12-16 RX ORDER — INSULIN LISPRO 100/ML
2 VIAL (ML) SUBCUTANEOUS EVERY 6 HOURS
Refills: 0 | Status: DISCONTINUED | OUTPATIENT
Start: 2020-12-16 | End: 2020-12-17

## 2020-12-16 RX ORDER — GLUCAGON INJECTION, SOLUTION 0.5 MG/.1ML
1 INJECTION, SOLUTION SUBCUTANEOUS ONCE
Refills: 0 | Status: DISCONTINUED | OUTPATIENT
Start: 2020-12-16 | End: 2020-12-23

## 2020-12-16 RX ORDER — VANCOMYCIN HCL 1 G
750 VIAL (EA) INTRAVENOUS EVERY 12 HOURS
Refills: 0 | Status: DISCONTINUED | OUTPATIENT
Start: 2020-12-16 | End: 2020-12-18

## 2020-12-16 RX ORDER — LEVETIRACETAM 250 MG/1
500 TABLET, FILM COATED ORAL
Refills: 0 | Status: DISCONTINUED | OUTPATIENT
Start: 2020-12-16 | End: 2020-12-20

## 2020-12-16 RX ORDER — LANOLIN ALCOHOL/MO/W.PET/CERES
3 CREAM (GRAM) TOPICAL AT BEDTIME
Refills: 0 | Status: DISCONTINUED | OUTPATIENT
Start: 2020-12-16 | End: 2020-12-23

## 2020-12-16 RX ORDER — THIAMINE MONONITRATE (VIT B1) 100 MG
100 TABLET ORAL DAILY
Refills: 0 | Status: DISCONTINUED | OUTPATIENT
Start: 2020-12-16 | End: 2020-12-23

## 2020-12-16 RX ORDER — DEXTROSE 50 % IN WATER 50 %
25 SYRINGE (ML) INTRAVENOUS ONCE
Refills: 0 | Status: DISCONTINUED | OUTPATIENT
Start: 2020-12-16 | End: 2020-12-23

## 2020-12-16 RX ORDER — DEXTROSE 50 % IN WATER 50 %
12.5 SYRINGE (ML) INTRAVENOUS ONCE
Refills: 0 | Status: DISCONTINUED | OUTPATIENT
Start: 2020-12-16 | End: 2020-12-23

## 2020-12-16 RX ORDER — DEXTROSE 50 % IN WATER 50 %
15 SYRINGE (ML) INTRAVENOUS ONCE
Refills: 0 | Status: DISCONTINUED | OUTPATIENT
Start: 2020-12-16 | End: 2020-12-23

## 2020-12-16 RX ORDER — SODIUM CHLORIDE 9 MG/ML
1000 INJECTION, SOLUTION INTRAVENOUS
Refills: 0 | Status: DISCONTINUED | OUTPATIENT
Start: 2020-12-16 | End: 2020-12-23

## 2020-12-16 RX ORDER — HALOPERIDOL DECANOATE 100 MG/ML
0.5 INJECTION INTRAMUSCULAR EVERY 6 HOURS
Refills: 0 | Status: DISCONTINUED | OUTPATIENT
Start: 2020-12-16 | End: 2020-12-17

## 2020-12-16 RX ORDER — INSULIN LISPRO 100/ML
VIAL (ML) SUBCUTANEOUS
Refills: 0 | Status: DISCONTINUED | OUTPATIENT
Start: 2020-12-16 | End: 2020-12-16

## 2020-12-16 RX ORDER — INSULIN LISPRO 100/ML
VIAL (ML) SUBCUTANEOUS AT BEDTIME
Refills: 0 | Status: DISCONTINUED | OUTPATIENT
Start: 2020-12-16 | End: 2020-12-16

## 2020-12-16 RX ORDER — INSULIN GLARGINE 100 [IU]/ML
6 INJECTION, SOLUTION SUBCUTANEOUS AT BEDTIME
Refills: 0 | Status: DISCONTINUED | OUTPATIENT
Start: 2020-12-16 | End: 2020-12-17

## 2020-12-16 RX ORDER — INSULIN LISPRO 100/ML
2 VIAL (ML) SUBCUTANEOUS
Refills: 0 | Status: DISCONTINUED | OUTPATIENT
Start: 2020-12-16 | End: 2020-12-16

## 2020-12-16 RX ORDER — INSULIN GLARGINE 100 [IU]/ML
6 INJECTION, SOLUTION SUBCUTANEOUS AT BEDTIME
Refills: 0 | Status: DISCONTINUED | OUTPATIENT
Start: 2020-12-16 | End: 2020-12-16

## 2020-12-16 RX ADMIN — CHLORHEXIDINE GLUCONATE 1 APPLICATION(S): 213 SOLUTION TOPICAL at 12:47

## 2020-12-16 RX ADMIN — PIPERACILLIN AND TAZOBACTAM 25 GRAM(S): 4; .5 INJECTION, POWDER, LYOPHILIZED, FOR SOLUTION INTRAVENOUS at 05:12

## 2020-12-16 RX ADMIN — LEVETIRACETAM 400 MILLIGRAM(S): 250 TABLET, FILM COATED ORAL at 02:36

## 2020-12-16 RX ADMIN — Medication 4: at 12:45

## 2020-12-16 RX ADMIN — PIPERACILLIN AND TAZOBACTAM 25 GRAM(S): 4; .5 INJECTION, POWDER, LYOPHILIZED, FOR SOLUTION INTRAVENOUS at 14:32

## 2020-12-16 RX ADMIN — Medication 100 MILLIGRAM(S): at 06:20

## 2020-12-16 RX ADMIN — Medication 5 MILLIGRAM(S): at 00:33

## 2020-12-16 RX ADMIN — HALOPERIDOL DECANOATE 0.5 MILLIGRAM(S): 100 INJECTION INTRAMUSCULAR at 15:32

## 2020-12-16 RX ADMIN — Medication 2 UNIT(S): at 18:24

## 2020-12-16 RX ADMIN — SODIUM CHLORIDE 3 MILLILITER(S): 9 INJECTION INTRAMUSCULAR; INTRAVENOUS; SUBCUTANEOUS at 05:24

## 2020-12-16 RX ADMIN — OLANZAPINE 2.5 MILLIGRAM(S): 15 TABLET, FILM COATED ORAL at 08:40

## 2020-12-16 RX ADMIN — SODIUM CHLORIDE 3 MILLILITER(S): 9 INJECTION INTRAMUSCULAR; INTRAVENOUS; SUBCUTANEOUS at 15:16

## 2020-12-16 RX ADMIN — Medication 5 MILLIGRAM(S): at 05:14

## 2020-12-16 RX ADMIN — Medication 2 UNIT(S): at 12:45

## 2020-12-16 RX ADMIN — SODIUM CHLORIDE 3 MILLILITER(S): 9 INJECTION INTRAMUSCULAR; INTRAVENOUS; SUBCUTANEOUS at 22:03

## 2020-12-16 RX ADMIN — INSULIN GLARGINE 6 UNIT(S): 100 INJECTION, SOLUTION SUBCUTANEOUS at 23:14

## 2020-12-16 RX ADMIN — Medication 3: at 00:33

## 2020-12-16 RX ADMIN — Medication 3: at 18:24

## 2020-12-16 RX ADMIN — Medication 3: at 05:15

## 2020-12-16 RX ADMIN — LEVETIRACETAM 500 MILLIGRAM(S): 250 TABLET, FILM COATED ORAL at 18:28

## 2020-12-16 RX ADMIN — Medication 3 MILLIGRAM(S): at 22:07

## 2020-12-16 RX ADMIN — Medication 100 MILLIGRAM(S): at 12:49

## 2020-12-16 RX ADMIN — Medication 250 MILLIGRAM(S): at 22:06

## 2020-12-16 NOTE — PROGRESS NOTE ADULT - ATTENDING COMMENTS
[place] was able to tell me his name and was able to follow simple commands.   89 yo M w/ hx DM, [place], ICH s/p fall and DKA, Decubitus ulcer, chronic zamudio p/w dislodged PEG. PEG replaced and confirmed. On routine labs was found to have a  elevated WBC found to have LL PNA on Xray. Bcx- Gram positive cocci clusters PCR + MRSA Ucx-Ecoli. CT chest with nodular opacity posible cavitation  Sepsis secondary to UTI vs PNA vs line infection-repeat Bcx- P                                                - vanco/zosyn                                                - TTE eval for endocarditis                                                 - PIV removed                                                 - vanco trough 10.5 increased dose to 750 q12 repeat vanco trough                                                -12/13 and 12/15 Bcx +mRSA/ IV line Cx- NGTD                                                 - case d/w [place] brother was at [facility] in [place] was transferred to [facility]. will need records for [place]  Decubitus Ulcer-sacral stage 3 wound care as per recs  protein calorie malnutrition -PEG tube in place and confirmed start Glucerna 35 ml/hr as per nutrition Hypernatremia- increase free water 250 q 6 monitor Na  DM- FS q6 ISS A1c 8.8 lantus 6 ademalog 2 u Sq 6   Prior ICH- cont keppra

## 2020-12-16 NOTE — PROGRESS NOTE ADULT - PROBLEM SELECTOR PLAN 3
Free water deficit of 0.5 L on 12/16  PEG tube feeds as per nutrition recs  200ml of free water Q6  Trend BMP

## 2020-12-16 NOTE — PROGRESS NOTE ADULT - PROBLEM SELECTOR PLAN 6
tongue is enlarged, was noted to be same by brother and grandson during prior admission  pending further collateral from outside facility regarding prior workup  no signs of airway compromise

## 2020-12-16 NOTE — PROGRESS NOTE ADULT - ATTENDING COMMENTS
MRSA bacteremia, lung nodules likely septic emboli  -cont vancomycin  -no PNA on ct - can dc zosyn  -check TTE  -f/u repeat bcx    Amandeep Briceño  Attending Physician   Division of Infectious Disease  Pager #[phone]  After 5pm/weekend or no response, call #[phone]

## 2020-12-16 NOTE — PROGRESS NOTE ADULT - SUBJECTIVE AND OBJECTIVE BOX
Darnell Elkins MD PGY1   Team 7  Contact/Pager - [phone]      Patient is a 89y old  Male who presents with a chief complaint of PEG tube dislodgement (16 Dec 2020 10:30)        SUBJECTIVE / OVERNIGHT EVENTS: Agitated overnight. Received Haldol PRN. This morning the patient was trying to escape bed and uncover himself. He was able to state his name and follow command to raise his arms. He was not able to answer further questions or follow other commands. He repeated multiple times "I have to go upstairs" and then attempted to get out of bed. As per staff the patient's behavior has been hard to control especially at night when he seems the most agitated.    Unable to complete ROS due to patient confusion      MEDICATIONS  (STANDING):  chlorhexidine 4% Liquid 1 Application(s) Topical daily  dextrose 40% Gel 15 Gram(s) Oral once  dextrose 5%. 1000 milliLiter(s) (50 mL/Hr) IV Continuous <Continuous>  dextrose 5%. 1000 milliLiter(s) (100 mL/Hr) IV Continuous <Continuous>  dextrose 50% Injectable 25 Gram(s) IV Push once  dextrose 50% Injectable 12.5 Gram(s) IV Push once  dextrose 50% Injectable 25 Gram(s) IV Push once  glucagon  Injectable 1 milliGRAM(s) IntraMuscular once  insulin glargine Injectable (LANTUS) 6 Unit(s) SubCutaneous at bedtime  insulin lispro (ADMELOG) corrective regimen sliding scale   SubCutaneous every 6 hours  insulin lispro Injectable (ADMELOG) 2 Unit(s) SubCutaneous three times a day before meals  levETIRAcetam 500 milliGRAM(s) Oral two times a day  melatonin 3 milliGRAM(s) Oral at bedtime  piperacillin/tazobactam IVPB.. 3.375 Gram(s) IV Intermittent every 8 hours  sodium chloride 0.45%. 1000 milliLiter(s) (75 mL/Hr) IV Continuous <Continuous>  sodium chloride 0.9% lock flush 3 milliLiter(s) IV Push every 8 hours  thiamine 100 milliGRAM(s) Oral daily  vancomycin  IVPB 750 milliGRAM(s) IV Intermittent every 12 hours    MEDICATIONS  (PRN):      Allergies    No Known Allergies    Intolerances          Vital Signs Last 24 Hrs  T(C): 36.8 (16 Dec 2020 05:10), Max: 36.8 (16 Dec 2020 05:10)  T(F): 98.2 (16 Dec 2020 05:10), Max: 98.2 (16 Dec 2020 05:10)  HR: 84 (16 Dec 2020 05:10) (67 - 92)  BP: 135/72 (16 Dec 2020 05:10) (132/61 - 156/64)  BP(mean): --  RR: 18 (16 Dec 2020 05:10) (17 - 18)  SpO2: 99% (16 Dec 2020 05:10) (96% - 99%)  CAPILLARY BLOOD GLUCOSE      POCT Blood Glucose.: 252 mg/dL (16 Dec 2020 05:12)  POCT Blood Glucose.: 271 mg/dL (16 Dec 2020 00:30)  POCT Blood Glucose.: 254 mg/dL (15 Dec 2020 18:03)  POCT Blood Glucose.: 287 mg/dL (15 Dec 2020 12:23)    I&O's Summary    15 Dec 2020 07:01  -  16 Dec 2020 07:00  --------------------------------------------------------  IN: 0 mL / OUT: 300 mL / NET: -300 mL    16 Dec 2020 07:01  -  16 Dec 2020 11:24  --------------------------------------------------------  IN: 0 mL / OUT: 400 mL / NET: -400 mL          PHYSICAL EXAM:  GENERAL: agitated, frail  HEAD:  AT, NC  EYES: EOMI, PERRLA, conjunctiva and sclera clear  ENMT: Airway patent. macroglossia  NECK: Supple, No JVD  CHEST/LUNG: CTABL; No wheezing, rhonci, or rales  HEART: RRR; Normal S1, S2. No murmurs, rubs, or gallops  ABDOMEN: Soft, NT, ND; Bowel sounds present. No organomegaly, PEG tube in place  EXTREMITIES:  2+ Peripheral Pulses, No clubbing, cyanosis, or edema  PSYCH: agitated  NEUROLOGY: moves all extremities, no gross focal neurological deficits  SKIN: sacral decubitus ulcer    LABS:                        8.9    22.40 )-----------( 276      ( 16 Dec 2020 05:25 )             28.3     12-16    148<H>  |  113<H>  |  32<H>  ----------------------------<  235<H>  3.4<L>   |  27  |  0.79    Ca    8.9      16 Dec 2020 05:25  Phos  3.4     12-15  Mg     2.2     12-15    TPro  6.4  /  Alb  2.4<L>  /  TBili  0.4  /  DBili  x   /  AST  11  /  ALT  16  /  AlkPhos  118  12-16    LIVER FUNCTIONS - ( 16 Dec 2020 05:25 )  Alb: 2.4 g/dL / Pro: 6.4 g/dL / ALK PHOS: 118 U/L / ALT: 16 U/L / AST: 11 U/L / GGT: x                       Culture - Blood (collected 15 Dec 2020 10:13)  Source: .Blood Blood-Venous  Gram Stain (16 Dec 2020 04:47):    Growth in aerobic bottle: Gram Positive Cocci in Clusters  Preliminary Report (16 Dec 2020 04:47):    Growth in aerobic bottle: Gram Positive Cocci in Clusters    Culture - Blood (collected 15 Dec 2020 10:13)  Source: .Blood Blood-Venous  Gram Stain (16 Dec 2020 02:21):    Growth in anaerobic bottle: Gram Positive Cocci in Clusters  Preliminary Report (16 Dec 2020 02:21):    Growth in anaerobic bottle: Gram Positive Cocci in Clusters    Culture - Catheter (collected 15 Dec 2020 01:00)  Source: .Catheter PICC Tip  Preliminary Report (15 Dec 2020 19:03):    No growth    Culture - Urine (collected 13 Dec 2020 21:44)  Source: .Urine Catheterized  Final Report (15 Dec 2020 16:42):    >100,000 CFU/ml Escherichia coli  Organism: Escherichia coli (15 Dec 2020 16:42)  Organism: Escherichia coli (15 Dec 2020 16:42)    Culture - Blood (collected 13 Dec 2020 17:15)  Source: .Blood Blood  Gram Stain (14 Dec 2020 13:52):    Growth in aerobic bottle: Gram Positive Cocci in Clusters    Growth in anaerobic bottle: Gram Positive Cocci in Clusters  Final Report (16 Dec 2020 10:45):    Growth in aerobic and anaerobic bottles: Methicillin resistant    Staphylococcus aureus See previous culture [id number]    Culture - Blood (collected 13 Dec 2020 17:00)  Source: .Blood Blood  Gram Stain (14 Dec 2020 12:12):    Growth in aerobic bottle: Gram Positive Cocci in Clusters    Growth in anaerobic bottle: Gram Positive Cocci in Clusters  Final Report (16 Dec 2020 09:57):    Growth in aerobic and anaerobic bottles: Staphylococcus aureus    "Due to technical problems, Proteus sp. and Pseudomonas aeroginosa will    Not be reported as part of the BCID panel    ***Blood Panel PCR results on this specimen are available    approximately 3 hours after the Gram stain result.***    Gram stain, PCR, and/or culture results may not always    correspond due to difference in methodologies.    ************************************************************    This PCR assay was performed using [employer].    The following targets are tested for: Enterococcus,    vancomycin resistant enterococci, Listeria monocytogenes,    coagulase negative staphylococci, S. aureus,    methicillin resistant S. aureus, Streptococcus agalactiae    (Group B), S. pneumoniae, S. pyogenes (Group A),    Acinetobacter baumannii, Enterobacter cloacae, E. coli,    Klebsiella oxytoca, K. pneumoniae, Proteus sp.,    Serratia marcescens, Haemophilus influenzae,    Neisseria meningitidis, Pseudomonas aeruginosa, Candida    albicans, C. glabrata, C krusei, C parapsilosis,    C. tropicalis and the KPC resistance gene.  Organism: Blood Culture PCR  Methicillin resistant Staphylococcus aureus (16 Dec 2020 09:57)  Organism: Methicillin resistant Staphylococcus aureus (16 Dec 2020 09:57)  Organism: Blood Culture PCR (16 Dec 2020 09:57)          RADIOLOGY & ADDITIONAL TESTS:    Imaging Personally Reviewed: YES    Consultant(s) Notes Reviewed: YES    Care Discussed with Consultants/Other Providers: YES

## 2020-12-16 NOTE — PROGRESS NOTE ADULT - SUBJECTIVE AND OBJECTIVE BOX
Follow Up:      Interval History/ROS:Patient is a 89y old  Male who presents with a chief complaint of PEG tube dislodgement (16 Dec 2020 09:38)      Allergies  No Known Allergies        ANTIMICROBIALS:    piperacillin/tazobactam IVPB.. 3.375 every 8 hours  vancomycin  IVPB 750 every 12 hours      OTHER MEDS: MEDICATIONS  (STANDING):  dextrose 40% Gel 15 once  dextrose 50% Injectable 25 once  dextrose 50% Injectable 12.5 once  dextrose 50% Injectable 25 once  glucagon  Injectable 1 once  insulin glargine Injectable (LANTUS) 6 at bedtime  insulin lispro (ADMELOG) corrective regimen sliding scale  every 6 hours  insulin lispro Injectable (ADMELOG) 2 three times a day before meals  levETIRAcetam 500 two times a day  melatonin 3 at bedtime      Vital Signs Last 24 Hrs  T(F): 98.2 (12-16-20 @ 05:10), Max: 99.5 (12-13-20 @ 23:31)    Vital Signs Last 24 Hrs  HR: 84 (12-16-20 @ 05:10) (67 - 92)  BP: 135/72 (12-16-20 @ 05:10) (132/61 - 156/64)  RR: 18 (12-16-20 @ 05:10)  SpO2: 99% (12-16-20 @ 05:10) (96% - 99%)  Wt(kg): --    EXAM:  GA: NAD  HEENT: oral cavity no lesion  CV: nl S1/S2, no RMG  Lungs: CTAB  Abd: BS+, soft, nontender, no rebounding pain  Ext: no edema  Skin:  IV: no phlebitis    Labs:                        8.9    22.40 )-----------( 276      ( 16 Dec 2020 05:25 )             28.3     12-16    148<H>  |  113<H>  |  32<H>  ----------------------------<  235<H>  3.4<L>   |  27  |  0.79    Ca    8.9      16 Dec 2020 05:25  Phos  3.4     12-15  Mg     2.2     12-15    TPro  6.4  /  Alb  2.4<L>  /  TBili  0.4  /  DBili  x   /  AST  11  /  ALT  16  /  AlkPhos  118  12-16      WBC Trend:  WBC Count: 22.40 (12-16-20 @ 05:25)  WBC Count: 24.48 (12-15-20 @ 06:52)  WBC Count: 26.80 (12-14-20 @ 07:07)  WBC Count: 29.44 (12-13-20 @ 15:53)      Creatine Trend:  Creatinine, Serum: 0.79 (12-16)  Creatinine, Serum: 0.77 (12-15)  Creatinine, Serum: 0.74 (12-14)  Creatinine, Serum: 0.77 (12-13)      Liver Biochemical Testing Trend:  Alanine Aminotransferase (ALT/SGPT): 16 (12-16)  Alanine Aminotransferase (ALT/SGPT): 16 (12-13)  Aspartate Aminotransferase (AST/SGOT): 11 (12-16-20 @ 05:25)  Aspartate Aminotransferase (AST/SGOT): 14 (12-13-20 @ 15:53)  Bilirubin Total, Serum: 0.4 (12-16)  Bilirubin Total, Serum: 0.5 (12-13)      Trend LDH          MICROBIOLOGY:  Vancomycin Level, Trough: 10.5 (12-16 @ 05:25)      Culture - Blood (collected 15 Dec 2020 10:13)  Source: .Blood Blood-Venous  Preliminary Report:    Growth in aerobic bottle: Gram Positive Cocci in Clusters    Culture - Blood (collected 15 Dec 2020 10:13)  Source: .Blood Blood-Venous  Preliminary Report:    Growth in anaerobic bottle: Gram Positive Cocci in Clusters    Culture - Catheter (collected 15 Dec 2020 01:00)  Source: .Catheter PICC Tip  Preliminary Report:    No growth    Culture - Urine (collected 13 Dec 2020 21:44)  Source: .Urine Catheterized  Final Report:    >100,000 CFU/ml Escherichia coli  Organism: Escherichia coli  Organism: Escherichia coli    Sensitivities:      -  Amikacin: S <=16      -  Amoxicillin/Clavulanic Acid: S <=8/4      -  Ampicillin: S <=8 These ampicillin results predict results for amoxicillin      -  Ampicillin/Sulbactam: S <=4/2 Enterobacter, Citrobacter, and Serratia may develop resistance during prolonged therapy (3-4 days)      -  Aztreonam: S <=4      -  Cefazolin: S <=2 (MIC_CL_COM_ENTERIC_CEFAZU) For uncomplicated UTI with K. pneumoniae, E. coli, or P. mirablis: LORENZO <=16 is sensitive and LORENZO >=32 is resistant. This also predicts results for oral agents cefaclor, cefdinir, cefpodoxime, cefprozil, cefuroxime axetil, cephalexin and locarbef for uncomplicated UTI. Note that some isolates may be susceptible to these agents while testing resistant to cefazolin.      -  Cefepime: S <=2      -  Cefoxitin: S <=8      -  Ceftriaxone: S <=1 Enterobacter, Citrobacter, and Serratia may develop resistance during prolonged therapy      -  Ciprofloxacin: S <=0.25      -  Ertapenem: S <=0.5      -  Gentamicin: S <=2      -  Imipenem: S <=1      -  Levofloxacin: S <=0.5      -  Meropenem: S <=1      -  Nitrofurantoin: S <=32 Should not be used to treat pyelonephritis      -  Piperacillin/Tazobactam: S <=8      -  Tigecycline: S <=2      -  Tobramycin: S <=2      -  Trimethoprim/Sulfamethoxazole: S <=0.5/9.5      Method Type: LORENZO    Culture - Blood (collected 13 Dec 2020 20:28)  Source: .Blood Blood  Preliminary Report:    Growth in aerobic and anaerobic bottles: Staphylococcus aureus See    previous culture [id number]    Culture - Blood (collected 13 Dec 2020 17:00)  Source: .Blood Blood  Final Report:    Growth in aerobic and anaerobic bottles: Staphylococcus aureus    "Due to technical problems, Proteus sp. and Pseudomonas aeroginosa will    Not be reported as part of the BCID panel    ***Blood Panel PCR results on this specimen are available    approximately 3 hours after the Gram stain result.***    Gram stain, PCR, and/or culture results may not always    correspond due to difference in methodologies.    ************************************************************    This PCR assay was performed using [employer].    The following targets are tested for: Enterococcus,    vancomycin resistant enterococci, Listeria monocytogenes,    coagulase negative staphylococci, S. aureus,    methicillin resistant S. aureus, Streptococcus agalactiae    (Group B), S. pneumoniae, S. pyogenes (Group A),    Acinetobacter baumannii, Enterobacter cloacae, E. coli,    Klebsiella oxytoca, K. pneumoniae, Proteus sp.,    Serratia marcescens, Haemophilus influenzae,    Neisseria meningitidis, Pseudomonas aeruginosa, Candida    albicans, C. glabrata, C krusei, C parapsilosis,    C. tropicalis and the KPC resistance gene.  Organism: Blood Culture PCR  Methicillin resistant Staphylococcus aureus  Organism: Methicillin resistant Staphylococcus aureus    Sensitivities:      -  Ampicillin/Sulbactam: R <=8/4      -  Cefazolin: R <=4      -  Clindamycin: S <=0.25      -  Daptomycin: S 0.5      -  Erythromycin: R >4      -  Gentamicin: S <=1 Should not be used as monotherapy      -  Linezolid: S 2      -  Oxacillin: R >2      -  Penicillin: R >8      -  RIF- Rifampin: S <=1 Should not be used as monotherapy      -  Tetra/Doxy: S <=1      -  Trimethoprim/Sulfamethoxazole: S <=0.5/9.5      -  Vancomycin: S 2      Method Type: LORENZO  Organism: Blood Culture PCR    Sensitivities:      -  Methicillin resistant Staphylococcus aureus (MRSA): Detec      Method Type: PCR        Legionella Antigen, Urine: Negative (12-14 @ 12:17)      OTHER TESTS:  COVID-19 PCR: NotDetec (12-13-20 @ 15:50)    RADIOLOGY:  imaging below personally reviewed      ASSESSMENT/RECOMMENDATIONS:    #PENDING RECS. PLEASE WAIT FOR FINAL RECS AFTER DISCUSSION WITH ATTENDING#     Follow Up:      Interval History/ROS:Patient is a 89y old  Male who presents with a chief complaint of PEG tube dislodgement (16 Dec 2020 09:38)  No acute events. Pt seen denying pain. Difficult to understand speech.     Allergies  No Known Allergies        ANTIMICROBIALS:    piperacillin/tazobactam IVPB.. 3.375 every 8 hours  vancomycin  IVPB 750 every 12 hours      OTHER MEDS: MEDICATIONS  (STANDING):  dextrose 40% Gel 15 once  dextrose 50% Injectable 25 once  dextrose 50% Injectable 12.5 once  dextrose 50% Injectable 25 once  glucagon  Injectable 1 once  insulin glargine Injectable (LANTUS) 6 at bedtime  insulin lispro (ADMELOG) corrective regimen sliding scale  every 6 hours  insulin lispro Injectable (ADMELOG) 2 three times a day before meals  levETIRAcetam 500 two times a day  melatonin 3 at bedtime      Vital Signs Last 24 Hrs  T(F): 98.2 (12-16-20 @ 05:10), Max: 99.5 (12-13-20 @ 23:31)    Vital Signs Last 24 Hrs  HR: 84 (12-16-20 @ 05:10) (67 - 92)  BP: 135/72 (12-16-20 @ 05:10) (132/61 - 156/64)  RR: 18 (12-16-20 @ 05:10)  SpO2: 99% (12-16-20 @ 05:10) (96% - 99%)  Wt(kg): --    EXAM:  GA: NAD, frail elderly male lying in bed comfortably  HEENT: oral cavity no lesion  CV: nl S1/S2, no RMG  Lungs: CTAB anteriorly  Abd: BS+, soft, nontender, no rebounding pain. PEG tube in place, no erythema.  Ext: no edema. No joint erythema, no TTP.   Skin: scab on R forearm. Multiple bruising on arms and legs. Sacral decub ulcer w/ erythema.     Labs:                        8.9    22.40 )-----------( 276      ( 16 Dec 2020 05:25 )             28.3     12-16    148<H>  |  113<H>  |  32<H>  ----------------------------<  235<H>  3.4<L>   |  27  |  0.79    Ca    8.9      16 Dec 2020 05:25  Phos  3.4     12-15  Mg     2.2     12-15    TPro  6.4  /  Alb  2.4<L>  /  TBili  0.4  /  DBili  x   /  AST  11  /  ALT  16  /  AlkPhos  118  12-16      WBC Trend:  WBC Count: 22.40 (12-16-20 @ 05:25)  WBC Count: 24.48 (12-15-20 @ 06:52)  WBC Count: 26.80 (12-14-20 @ 07:07)  WBC Count: 29.44 (12-13-20 @ 15:53)      Creatine Trend:  Creatinine, Serum: 0.79 (12-16)  Creatinine, Serum: 0.77 (12-15)  Creatinine, Serum: 0.74 (12-14)  Creatinine, Serum: 0.77 (12-13)      Liver Biochemical Testing Trend:  Alanine Aminotransferase (ALT/SGPT): 16 (12-16)  Alanine Aminotransferase (ALT/SGPT): 16 (12-13)  Aspartate Aminotransferase (AST/SGOT): 11 (12-16-20 @ 05:25)  Aspartate Aminotransferase (AST/SGOT): 14 (12-13-20 @ 15:53)  Bilirubin Total, Serum: 0.4 (12-16)  Bilirubin Total, Serum: 0.5 (12-13)      Trend LDH          MICROBIOLOGY:  Vancomycin Level, Trough: 10.5 (12-16 @ 05:25)      Culture - Blood (collected 15 Dec 2020 10:13)  Source: .Blood Blood-Venous  Preliminary Report:    Growth in aerobic bottle: Gram Positive Cocci in Clusters    Culture - Blood (collected 15 Dec 2020 10:13)  Source: .Blood Blood-Venous  Preliminary Report:    Growth in anaerobic bottle: Gram Positive Cocci in Clusters    Culture - Catheter (collected 15 Dec 2020 01:00)  Source: .Catheter PICC Tip  Preliminary Report:    No growth    Culture - Urine (collected 13 Dec 2020 21:44)  Source: .Urine Catheterized  Final Report:    >100,000 CFU/ml Escherichia coli  Organism: Escherichia coli  Organism: Escherichia coli    Sensitivities:      -  Amikacin: S <=16      -  Amoxicillin/Clavulanic Acid: S <=8/4      -  Ampicillin: S <=8 These ampicillin results predict results for amoxicillin      -  Ampicillin/Sulbactam: S <=4/2 Enterobacter, Citrobacter, and Serratia may develop resistance during prolonged therapy (3-4 days)      -  Aztreonam: S <=4      -  Cefazolin: S <=2 (MIC_CL_COM_ENTERIC_CEFAZU) For uncomplicated UTI with K. pneumoniae, E. coli, or P. mirablis: LORENZO <=16 is sensitive and LORENZO >=32 is resistant. This also predicts results for oral agents cefaclor, cefdinir, cefpodoxime, cefprozil, cefuroxime axetil, cephalexin and locarbef for uncomplicated UTI. Note that some isolates may be susceptible to these agents while testing resistant to cefazolin.      -  Cefepime: S <=2      -  Cefoxitin: S <=8      -  Ceftriaxone: S <=1 Enterobacter, Citrobacter, and Serratia may develop resistance during prolonged therapy      -  Ciprofloxacin: S <=0.25      -  Ertapenem: S <=0.5      -  Gentamicin: S <=2      -  Imipenem: S <=1      -  Levofloxacin: S <=0.5      -  Meropenem: S <=1      -  Nitrofurantoin: S <=32 Should not be used to treat pyelonephritis      -  Piperacillin/Tazobactam: S <=8      -  Tigecycline: S <=2      -  Tobramycin: S <=2      -  Trimethoprim/Sulfamethoxazole: S <=0.5/9.5      Method Type: LORENZO    Culture - Blood (collected 13 Dec 2020 20:28)  Source: .Blood Blood  Preliminary Report:    Growth in aerobic and anaerobic bottles: Staphylococcus aureus See    previous culture [id number]    Culture - Blood (collected 13 Dec 2020 17:00)  Source: .Blood Blood  Final Report:    Growth in aerobic and anaerobic bottles: Staphylococcus aureus    "Due to technical problems, Proteus sp. and Pseudomonas aeroginosa will    Not be reported as part of the BCID panel    ***Blood Panel PCR results on this specimen are available    approximately 3 hours after the Gram stain result.***    Gram stain, PCR, and/or culture results may not always    correspond due to difference in methodologies.    ************************************************************    This PCR assay was performed using [employer].    The following targets are tested for: Enterococcus,    vancomycin resistant enterococci, Listeria monocytogenes,    coagulase negative staphylococci, S. aureus,    methicillin resistant S. aureus, Streptococcus agalactiae    (Group B), S. pneumoniae, S. pyogenes (Group A),    Acinetobacter baumannii, Enterobacter cloacae, E. coli,    Klebsiella oxytoca, K. pneumoniae, Proteus sp.,    Serratia marcescens, Haemophilus influenzae,    Neisseria meningitidis, Pseudomonas aeruginosa, Candida    albicans, C. glabrata, C krusei, C parapsilosis,    C. tropicalis and the KPC resistance gene.  Organism: Blood Culture PCR  Methicillin resistant Staphylococcus aureus  Organism: Methicillin resistant Staphylococcus aureus    Sensitivities:      -  Ampicillin/Sulbactam: R <=8/4      -  Cefazolin: R <=4      -  Clindamycin: S <=0.25      -  Daptomycin: S 0.5      -  Erythromycin: R >4      -  Gentamicin: S <=1 Should not be used as monotherapy      -  Linezolid: S 2      -  Oxacillin: R >2      -  Penicillin: R >8      -  RIF- Rifampin: S <=1 Should not be used as monotherapy      -  Tetra/Doxy: S <=1      -  Trimethoprim/Sulfamethoxazole: S <=0.5/9.5      -  Vancomycin: S 2      Method Type: LORENZO  Organism: Blood Culture PCR    Sensitivities:      -  Methicillin resistant Staphylococcus aureus (MRSA): Detec      Method Type: PCR        Legionella Antigen, Urine: Negative (12-14 @ 12:17)      OTHER TESTS:  COVID-19 PCR: NotDetec (12-13-20 @ 15:50)    RADIOLOGY:    < from: CT Chest No Cont (12.16.20 @ 09:02) >  FINDINGS:    LUNGS/AIRWAYS/PLEURA: No endobronchial lesions are noted. Several nodular opacities are noted in the peripheral aspects of both lungs. Few of them demonstrate central lucency/cavitation. Small bilateral pleural effusions are noted.    MEDIASTINUM AND BROOK: No lymphadenopathy.    VESSELS: Calcifications of the aorta.    HEART: Coronary artery calcifications. Heart size is mildly enlarged. Smallpericardial effusion.    CHEST WALL AND LOWER NECK: Within normal limits.    VISUALIZED UPPER ABDOMEN: Contrast visible within the bowel. PEG tube within the stomach.    BONES: Degenerative changes.    IMPRESSION:    Several nodular opacities, some with central lucency/cavitation are noted in the peripheral aspect of both lungs. Primary consideration is infection, such as septic emboli.    Small pericardial and small bilateral pleural effusions.    < end of copied text >

## 2020-12-16 NOTE — PROGRESS NOTE ADULT - ASSESSMENT
89M, bedbound resident at  [facility] PMH of HTN, DM, h/o chronic ETOH abuse, ICH s/p fall (recently treated at [facility] on 11/3/20 for ICH s/p fall & DKA) started on Keppra for seizure ppx, dysphagia s/p PEG placement and chronic zamudio admitted for sepsis. Of note pt has a PICC line for unclear reasons. Pt found to be bacteremic, blood culture growing MRSA.    #MRSA bacteremia  - blood cultures growing MRSA x2 bottles 12/13, source likely PICC line  - Stage 3 pressure ulcer does not appear acutely infected    #nodular opacities on CT chest  - CT chest showing several peripheral nodular opacities some with lucency/cavitations, ?septic emboli?    Plan:  - continue vanc 500mg q12h  - continue zosyn 3.375 q8h  - f/u repeat blood cultures x2 (12/16)  - f/u vanc trough levels   - obtain TTE r/o endocarditis w/ septic emboli   - appreciate wound care recs    #PENDING RECS. PLEASE WAIT FOR FINAL RECS AFTER DISCUSSION WITH ATTENDING#   89M, bedbound resident at  [facility] PMH of HTN, DM, h/o chronic ETOH abuse, ICH s/p fall (recently treated at [facility] on 11/3/20 for ICH s/p fall & DKA) started on Keppra for seizure ppx, dysphagia s/p PEG placement and chronic zamudio admitted for sepsis. Of note pt has a PICC line for unclear reasons. Pt found to be bacteremic, blood culture growing MRSA.    #MRSA bacteremia  - blood cultures growing MRSA x2 bottles 12/13, source likely PICC line  - Stage 3 pressure ulcer does not appear acutely infected  - CT chest showing several peripheral nodular opacities some with lucency/cavitations, suspicious for septic emboli    Plan:  - continue vanc 500mg q12h  - continue zosyn 3.375 q8h  - f/u repeat blood cultures x2 (12/16)  - f/u vanc trough levels   - obtain TTE r/o endocarditis, CT chest with opacities suspicious for septic emboli  - appreciate wound care recs

## 2020-12-16 NOTE — PROGRESS NOTE ADULT - PROBLEM SELECTOR PLAN 1
Sepsis secondary to unclear source- most likely sacral ulcer, less likely UTI, Pneumonia or Midline  Bcx positive for MRSA, Ucx with Ecoli, Xray with signs of Pneumonia with no signs of cavitation, Midline culture NGTD  TTE pending for infective endocarditis  Urine is less likely to be the source of sepsis as per ID  Zosyn started in the ED will continue until can confirm no pneumonia on CT chest Sepsis secondary to unclear source- most likely sacral ulcer, less likely UTI, Pneumonia or Midline  Bcx positive for MRSA, Ucx with Ecoli, CT chest with peripheral opacities and cavitation suspicious for septic emboli, Midline culture NGTD  TTE pending for infective endocarditis  Urine is less likely to be the source of sepsis as per ID  Zosyn started in the ED will continue until can confirm no pneumonia on CT chest

## 2020-12-17 DIAGNOSIS — D64.9 ANEMIA, UNSPECIFIED: ICD-10-CM

## 2020-12-17 LAB
ADD ON TEST-SPECIMEN IN LAB: SIGNIFICANT CHANGE UP
ANION GAP SERPL CALC-SCNC: 6 MMOL/L — LOW (ref 7–14)
BASOPHILS # BLD AUTO: 0.1 K/UL — SIGNIFICANT CHANGE UP (ref 0–0.2)
BASOPHILS # BLD AUTO: 0.13 K/UL — SIGNIFICANT CHANGE UP (ref 0–0.2)
BASOPHILS NFR BLD AUTO: 0.4 % — SIGNIFICANT CHANGE UP (ref 0–2)
BASOPHILS NFR BLD AUTO: 0.6 % — SIGNIFICANT CHANGE UP (ref 0–2)
BLD GP AB SCN SERPL QL: NEGATIVE — SIGNIFICANT CHANGE UP
BUN SERPL-MCNC: 25 MG/DL — HIGH (ref 7–23)
CALCIUM SERPL-MCNC: 8.5 MG/DL — SIGNIFICANT CHANGE UP (ref 8.4–10.5)
CHLORIDE SERPL-SCNC: 112 MMOL/L — HIGH (ref 98–107)
CO2 SERPL-SCNC: 29 MMOL/L — SIGNIFICANT CHANGE UP (ref 22–31)
CREAT SERPL-MCNC: 0.71 MG/DL — SIGNIFICANT CHANGE UP (ref 0.5–1.3)
CULTURE RESULTS: SIGNIFICANT CHANGE UP
CULTURE RESULTS: SIGNIFICANT CHANGE UP
EOSINOPHIL # BLD AUTO: 0.81 K/UL — HIGH (ref 0–0.5)
EOSINOPHIL # BLD AUTO: 0.84 K/UL — HIGH (ref 0–0.5)
EOSINOPHIL NFR BLD AUTO: 3.6 % — SIGNIFICANT CHANGE UP (ref 0–6)
EOSINOPHIL NFR BLD AUTO: 3.8 % — SIGNIFICANT CHANGE UP (ref 0–6)
FERRITIN SERPL-MCNC: 692 NG/ML — HIGH (ref 30–400)
FOLATE SERPL-MCNC: 20 NG/ML — HIGH (ref 3.1–17.5)
GLUCOSE SERPL-MCNC: 213 MG/DL — HIGH (ref 70–99)
HAPTOGLOB SERPL-MCNC: 238 MG/DL — HIGH (ref 34–200)
HCT VFR BLD CALC: 25.1 % — LOW (ref 39–50)
HCT VFR BLD CALC: 25.2 % — LOW (ref 39–50)
HGB BLD-MCNC: 7.7 G/DL — LOW (ref 13–17)
HGB BLD-MCNC: 8 G/DL — LOW (ref 13–17)
IANC: 16.23 K/UL — HIGH (ref 1.5–8.5)
IANC: 17.67 K/UL — HIGH (ref 1.5–8.5)
IMM GRANULOCYTES NFR BLD AUTO: 2.4 % — HIGH (ref 0–1.5)
IMM GRANULOCYTES NFR BLD AUTO: 2.5 % — HIGH (ref 0–1.5)
IRON SATN MFR SERPL: 30 UG/DL — LOW (ref 45–165)
IRON SATN MFR SERPL: 33 % — SIGNIFICANT CHANGE UP (ref 14–50)
LDH SERPL L TO P-CCNC: 159 U/L — SIGNIFICANT CHANGE UP (ref 135–225)
LYMPHOCYTES # BLD AUTO: 12.4 % — LOW (ref 13–44)
LYMPHOCYTES # BLD AUTO: 12.9 % — LOW (ref 13–44)
LYMPHOCYTES # BLD AUTO: 2.77 K/UL — SIGNIFICANT CHANGE UP (ref 1–3.3)
LYMPHOCYTES # BLD AUTO: 2.89 K/UL — SIGNIFICANT CHANGE UP (ref 1–3.3)
MAGNESIUM SERPL-MCNC: 1.9 MG/DL — SIGNIFICANT CHANGE UP (ref 1.6–2.6)
MCHC RBC-ENTMCNC: 30.7 GM/DL — LOW (ref 32–36)
MCHC RBC-ENTMCNC: 31.3 PG — SIGNIFICANT CHANGE UP (ref 27–34)
MCHC RBC-ENTMCNC: 31.7 GM/DL — LOW (ref 32–36)
MCHC RBC-ENTMCNC: 32.3 PG — SIGNIFICANT CHANGE UP (ref 27–34)
MCV RBC AUTO: 101.6 FL — HIGH (ref 80–100)
MCV RBC AUTO: 102 FL — HIGH (ref 80–100)
MONOCYTES # BLD AUTO: 1.07 K/UL — HIGH (ref 0–0.9)
MONOCYTES # BLD AUTO: 1.2 K/UL — HIGH (ref 0–0.9)
MONOCYTES NFR BLD AUTO: 5 % — SIGNIFICANT CHANGE UP (ref 2–14)
MONOCYTES NFR BLD AUTO: 5.2 % — SIGNIFICANT CHANGE UP (ref 2–14)
NEUTROPHILS # BLD AUTO: 16.23 K/UL — HIGH (ref 1.8–7.4)
NEUTROPHILS # BLD AUTO: 17.67 K/UL — HIGH (ref 1.8–7.4)
NEUTROPHILS NFR BLD AUTO: 75.2 % — SIGNIFICANT CHANGE UP (ref 43–77)
NEUTROPHILS NFR BLD AUTO: 76 % — SIGNIFICANT CHANGE UP (ref 43–77)
NRBC # BLD: 0 /100 WBCS — SIGNIFICANT CHANGE UP
NRBC # BLD: 0 /100 WBCS — SIGNIFICANT CHANGE UP
NRBC # FLD: 0 K/UL — SIGNIFICANT CHANGE UP
NRBC # FLD: 0 K/UL — SIGNIFICANT CHANGE UP
PHOSPHATE SERPL-MCNC: 2.6 MG/DL — SIGNIFICANT CHANGE UP (ref 2.5–4.5)
PLATELET # BLD AUTO: 242 K/UL — SIGNIFICANT CHANGE UP (ref 150–400)
PLATELET # BLD AUTO: 257 K/UL — SIGNIFICANT CHANGE UP (ref 150–400)
POTASSIUM SERPL-MCNC: 3.2 MMOL/L — LOW (ref 3.5–5.3)
POTASSIUM SERPL-SCNC: 3.2 MMOL/L — LOW (ref 3.5–5.3)
RBC # BLD: 2.46 M/UL — LOW (ref 4.2–5.8)
RBC # BLD: 2.48 M/UL — LOW (ref 4.2–5.8)
RBC # BLD: 2.48 M/UL — LOW (ref 4.2–5.8)
RBC # FLD: 16 % — HIGH (ref 10.3–14.5)
RBC # FLD: 16.1 % — HIGH (ref 10.3–14.5)
RETICS #: 13.4 K/UL — LOW (ref 25–125)
RETICS/RBC NFR: 0.5 % — SIGNIFICANT CHANGE UP (ref 0.5–2.5)
RH IG SCN BLD-IMP: POSITIVE — SIGNIFICANT CHANGE UP
SODIUM SERPL-SCNC: 147 MMOL/L — HIGH (ref 135–145)
SPECIMEN SOURCE: SIGNIFICANT CHANGE UP
SPECIMEN SOURCE: SIGNIFICANT CHANGE UP
TIBC SERPL-MCNC: 90 UG/DL — LOW (ref 220–430)
UIBC SERPL-MCNC: 60 UG/DL — LOW (ref 110–370)
VIT B12 SERPL-MCNC: 744 PG/ML — SIGNIFICANT CHANGE UP (ref 200–900)
WBC # BLD: 21.54 K/UL — HIGH (ref 3.8–10.5)
WBC # BLD: 23.26 K/UL — HIGH (ref 3.8–10.5)
WBC # FLD AUTO: 21.54 K/UL — HIGH (ref 3.8–10.5)
WBC # FLD AUTO: 23.26 K/UL — HIGH (ref 3.8–10.5)

## 2020-12-17 PROCEDURE — 99232 SBSQ HOSP IP/OBS MODERATE 35: CPT

## 2020-12-17 PROCEDURE — 99233 SBSQ HOSP IP/OBS HIGH 50: CPT

## 2020-12-17 PROCEDURE — 70450 CT HEAD/BRAIN W/O DYE: CPT | Mod: 26

## 2020-12-17 PROCEDURE — 90792 PSYCH DIAG EVAL W/MED SRVCS: CPT

## 2020-12-17 RX ORDER — HALOPERIDOL DECANOATE 100 MG/ML
0.25 INJECTION INTRAMUSCULAR EVERY 6 HOURS
Refills: 0 | Status: DISCONTINUED | OUTPATIENT
Start: 2020-12-17 | End: 2020-12-23

## 2020-12-17 RX ORDER — INSULIN LISPRO 100/ML
3 VIAL (ML) SUBCUTANEOUS EVERY 6 HOURS
Refills: 0 | Status: DISCONTINUED | OUTPATIENT
Start: 2020-12-17 | End: 2020-12-17

## 2020-12-17 RX ORDER — INSULIN GLARGINE 100 [IU]/ML
9 INJECTION, SOLUTION SUBCUTANEOUS ONCE
Refills: 0 | Status: COMPLETED | OUTPATIENT
Start: 2020-12-17 | End: 2020-12-17

## 2020-12-17 RX ORDER — POTASSIUM CHLORIDE 20 MEQ
40 PACKET (EA) ORAL ONCE
Refills: 0 | Status: COMPLETED | OUTPATIENT
Start: 2020-12-17 | End: 2020-12-17

## 2020-12-17 RX ORDER — INSULIN GLARGINE 100 [IU]/ML
12 INJECTION, SOLUTION SUBCUTANEOUS AT BEDTIME
Refills: 0 | Status: DISCONTINUED | OUTPATIENT
Start: 2020-12-17 | End: 2020-12-17

## 2020-12-17 RX ORDER — INSULIN GLARGINE 100 [IU]/ML
15 INJECTION, SOLUTION SUBCUTANEOUS AT BEDTIME
Refills: 0 | Status: DISCONTINUED | OUTPATIENT
Start: 2020-12-17 | End: 2020-12-18

## 2020-12-17 RX ADMIN — SODIUM CHLORIDE 3 MILLILITER(S): 9 INJECTION INTRAMUSCULAR; INTRAVENOUS; SUBCUTANEOUS at 06:06

## 2020-12-17 RX ADMIN — Medication 1: at 12:33

## 2020-12-17 RX ADMIN — Medication 3 MILLIGRAM(S): at 21:40

## 2020-12-17 RX ADMIN — Medication 3: at 01:15

## 2020-12-17 RX ADMIN — Medication 40 MILLIEQUIVALENT(S): at 11:50

## 2020-12-17 RX ADMIN — Medication 100 MILLIGRAM(S): at 11:50

## 2020-12-17 RX ADMIN — Medication 250 MILLIGRAM(S): at 06:07

## 2020-12-17 RX ADMIN — INSULIN GLARGINE 9 UNIT(S): 100 INJECTION, SOLUTION SUBCUTANEOUS at 11:47

## 2020-12-17 RX ADMIN — Medication 2 UNIT(S): at 01:16

## 2020-12-17 RX ADMIN — Medication 250 MILLIGRAM(S): at 19:49

## 2020-12-17 RX ADMIN — LEVETIRACETAM 500 MILLIGRAM(S): 250 TABLET, FILM COATED ORAL at 06:07

## 2020-12-17 RX ADMIN — Medication 2 UNIT(S): at 07:00

## 2020-12-17 RX ADMIN — SODIUM CHLORIDE 3 MILLILITER(S): 9 INJECTION INTRAMUSCULAR; INTRAVENOUS; SUBCUTANEOUS at 21:40

## 2020-12-17 RX ADMIN — CHLORHEXIDINE GLUCONATE 1 APPLICATION(S): 213 SOLUTION TOPICAL at 11:49

## 2020-12-17 RX ADMIN — INSULIN GLARGINE 15 UNIT(S): 100 INJECTION, SOLUTION SUBCUTANEOUS at 22:26

## 2020-12-17 RX ADMIN — SODIUM CHLORIDE 3 MILLILITER(S): 9 INJECTION INTRAMUSCULAR; INTRAVENOUS; SUBCUTANEOUS at 14:13

## 2020-12-17 RX ADMIN — LEVETIRACETAM 500 MILLIGRAM(S): 250 TABLET, FILM COATED ORAL at 19:53

## 2020-12-17 RX ADMIN — Medication 1: at 07:00

## 2020-12-17 NOTE — PROGRESS NOTE ADULT - SUBJECTIVE AND OBJECTIVE BOX
Darnell Elkins MD PGY1   Team 7  Contact/Pager - [phone]      Patient is a 89y old  Male who presents with a chief complaint of PEG tube dislodgement (17 Dec 2020 09:43)        SUBJECTIVE / OVERNIGHT EVENTS: The patient received Zyprexa and Haldol yesterday for agitation. No overnight events. Patient seen and examined at the bedside this AM.  He is sleeping, he is arousable to voice. He reaches out to resist exam. He could not follow commands    The patient is unable to participate in ROS.    MEDICATIONS  (STANDING):  chlorhexidine 4% Liquid 1 Application(s) Topical daily  dextrose 40% Gel 15 Gram(s) Oral once  dextrose 5%. 1000 milliLiter(s) (50 mL/Hr) IV Continuous <Continuous>  dextrose 5%. 1000 milliLiter(s) (100 mL/Hr) IV Continuous <Continuous>  dextrose 50% Injectable 25 Gram(s) IV Push once  dextrose 50% Injectable 12.5 Gram(s) IV Push once  dextrose 50% Injectable 25 Gram(s) IV Push once  glucagon  Injectable 1 milliGRAM(s) IntraMuscular once  insulin glargine Injectable (LANTUS) 9 Unit(s) SubCutaneous once  insulin glargine Injectable (LANTUS) 15 Unit(s) SubCutaneous at bedtime  insulin lispro (ADMELOG) corrective regimen sliding scale   SubCutaneous every 6 hours  levETIRAcetam 500 milliGRAM(s) Oral two times a day  melatonin 3 milliGRAM(s) Oral at bedtime  potassium chloride   Solution 40 milliEquivalent(s) Oral once  sodium chloride 0.45%. 1000 milliLiter(s) (75 mL/Hr) IV Continuous <Continuous>  sodium chloride 0.9% lock flush 3 milliLiter(s) IV Push every 8 hours  thiamine 100 milliGRAM(s) Oral daily  vancomycin  IVPB 750 milliGRAM(s) IV Intermittent every 12 hours    MEDICATIONS  (PRN):  haloperidol    Injectable 0.5 milliGRAM(s) IntraMuscular every 6 hours PRN Agitation      Allergies    No Known Allergies    Intolerances          Vital Signs Last 24 Hrs  T(C): 36.9 (17 Dec 2020 10:00), Max: 37.8 (17 Dec 2020 06:54)  T(F): 98.5 (17 Dec 2020 10:00), Max: 100.1 (17 Dec 2020 06:54)  HR: 77 (17 Dec 2020 10:00) (77 - 89)  BP: 153/60 (17 Dec 2020 10:00) (132/61 - 153/60)  BP(mean): --  RR: 19 (17 Dec 2020 10:00) (18 - 19)  SpO2: 97% (17 Dec 2020 10:00) (97% - 99%)  CAPILLARY BLOOD GLUCOSE      POCT Blood Glucose.: 185 mg/dL (17 Dec 2020 06:33)  POCT Blood Glucose.: 276 mg/dL (17 Dec 2020 01:06)  POCT Blood Glucose.: 181 mg/dL (16 Dec 2020 21:57)  POCT Blood Glucose.: 255 mg/dL (16 Dec 2020 17:54)  POCT Blood Glucose.: 304 mg/dL (16 Dec 2020 12:27)    I&O's Summary    16 Dec 2020 07:01  -  17 Dec 2020 07:00  --------------------------------------------------------  IN: 450 mL / OUT: 1200 mL / NET: -750 mL          PHYSICAL EXAM:  GENERAL: thin frail elderly  HEAD:  AT, NC  EYES: EOMI, PERRLA, conjunctiva and sclera clear  ENMT: Airway patent. dry mucus membranes, poor dentition, macroglossia  NECK: Supple,   CHEST/LUNG: CTABL; No wheezing, rhonci, or rales  HEART: RRR; Normal S1, S2. No murmurs, rubs, or gallops  ABDOMEN: Soft, NT, ND; Bowel sounds present. No organomegaly, PEG tube in place  GI/-zamudio inplace with yellow urine, RADHA-soft brown stool in rectal vault, no blood or melena  EXTREMITIES:  2+ Peripheral Pulses, No clubbing, cyanosis, or edema  PSYCH: intermittent agitation  NEUROLOGY: no obvious focal motor deficits  SKIN: sacral decubitus      LABS:                        8.0    23.26 )-----------( 257      ( 17 Dec 2020 08:56 )             25.2     12-17    147<H>  |  112<H>  |  25<H>  ----------------------------<  213<H>  3.2<L>   |  29  |  0.71    Ca    8.5      17 Dec 2020 07:36  Phos  2.6     12-17  Mg     1.9     12-17    TPro  5.9<L>  /  Alb  2.1<L>  /  TBili  0.3  /  DBili  x   /  AST  8   /  ALT  11  /  AlkPhos  103  12-17    LIVER FUNCTIONS - ( 17 Dec 2020 07:36 )  Alb: 2.1 g/dL / Pro: 5.9 g/dL / ALK PHOS: 103 U/L / ALT: 11 U/L / AST: 8 U/L / GGT: x                       Culture - Blood (collected 15 Dec 2020 10:13)  Source: .Blood Blood-Venous  Gram Stain (16 Dec 2020 20:49):    Growth in aerobic bottle: Gram Positive Cocci in Clusters    Growth in anaerobic bottle: Gram Positive Cocci in Clusters  Preliminary Report (16 Dec 2020 23:27):    Growth in aerobic bottle: Methicillin resistant Staphylococcus aureus    See previous culture [id number]    Growth in anaerobic bottle: Gram Positive Cocci in Clusters    Culture - Blood (collected 15 Dec 2020 10:13)  Source: .Blood Blood-Venous  Gram Stain (16 Dec 2020 11:31):    Growth in anaerobic bottle: Gram Positive Cocci in Clusters    Growth in aerobic bottle: Gram Positive Cocci in Clusters  Preliminary Report (16 Dec 2020 22:12):    Growth in anaerobic bottle: Methicillin resistant Staphylococcus aureus    See previous culture [id number]    Growth in aerobic bottle: Gram Positive Cocci in Clusters    Culture - Catheter (collected 14 Dec 2020 22:03)  Source: .Catheter PICC Tip  Final Report (16 Dec 2020 19:32):    No growth          RADIOLOGY & ADDITIONAL TESTS:    Imaging Personally Reviewed: YES    Consultant(s) Notes Reviewed: YES    Care Discussed with Consultants/Other Providers: YES

## 2020-12-17 NOTE — PROGRESS NOTE ADULT - ATTENDING COMMENTS
Amandeep Briceño  Attending Physician   Division of Infectious Disease  Pager #[phone]  After 5pm/weekend or no response, call #[phone]

## 2020-12-17 NOTE — PROGRESS NOTE ADULT - SUBJECTIVE AND OBJECTIVE BOX
HERVE NGUYEN 89y [id number]    Patient is a 89y old  Male who presents with a chief complaint of PEG tube dislodgement (17 Dec 2020 09:43)      Follow Up/CC:  ID following for bacteremia    Interval History/ROS: no fever, tired    Allergies    No Known Allergies    Intolerances        ANTIMICROBIALS:  vancomycin  IVPB 750 every 12 hours      MEDICATIONS  (STANDING):  chlorhexidine 4% Liquid 1 Application(s) Topical daily  dextrose 40% Gel 15 Gram(s) Oral once  dextrose 5%. 1000 milliLiter(s) (50 mL/Hr) IV Continuous <Continuous>  dextrose 5%. 1000 milliLiter(s) (100 mL/Hr) IV Continuous <Continuous>  dextrose 50% Injectable 25 Gram(s) IV Push once  dextrose 50% Injectable 12.5 Gram(s) IV Push once  dextrose 50% Injectable 25 Gram(s) IV Push once  glucagon  Injectable 1 milliGRAM(s) IntraMuscular once  insulin glargine Injectable (LANTUS) 15 Unit(s) SubCutaneous at bedtime  insulin lispro (ADMELOG) corrective regimen sliding scale   SubCutaneous every 6 hours  levETIRAcetam 500 milliGRAM(s) Oral two times a day  melatonin 3 milliGRAM(s) Oral at bedtime  sodium chloride 0.45%. 1000 milliLiter(s) (75 mL/Hr) IV Continuous <Continuous>  sodium chloride 0.9% lock flush 3 milliLiter(s) IV Push every 8 hours  thiamine 100 milliGRAM(s) Oral daily  vancomycin  IVPB 750 milliGRAM(s) IV Intermittent every 12 hours    MEDICATIONS  (PRN):  haloperidol    Injectable 0.25 milliGRAM(s) IV Push every 6 hours PRN Agitation        Vital Signs Last 24 Hrs  T(C): 36.9 (17 Dec 2020 14:08), Max: 37.8 (17 Dec 2020 06:54)  T(F): 98.4 (17 Dec 2020 14:08), Max: 100.1 (17 Dec 2020 06:54)  HR: 84 (17 Dec 2020 14:08) (77 - 89)  BP: 149/70 (17 Dec 2020 14:08) (147/65 - 153/60)  BP(mean): --  RR: 19 (17 Dec 2020 14:08) (18 - 19)  SpO2: 97% (17 Dec 2020 14:08) (97% - 98%)    CBC Full  -  ( 17 Dec 2020 08:56 )  WBC Count : 23.26 K/uL  RBC Count : 2.48 M/uL  Hemoglobin : 8.0 g/dL  Hematocrit : 25.2 %  Platelet Count - Automated : 257 K/uL  Mean Cell Volume : 101.6 fL  Mean Cell Hemoglobin : 32.3 pg  Mean Cell Hemoglobin Concentration : 31.7 gm/dL  Auto Neutrophil # : 17.67 K/uL  Auto Lymphocyte # : 2.89 K/uL  Auto Monocyte # : 1.20 K/uL  Auto Eosinophil # : 0.84 K/uL  Auto Basophil # : 0.10 K/uL  Auto Neutrophil % : 76.0 %  Auto Lymphocyte % : 12.4 %  Auto Monocyte % : 5.2 %  Auto Eosinophil % : 3.6 %  Auto Basophil % : 0.4 %    12-17    147<H>  |  112<H>  |  25<H>  ----------------------------<  213<H>  3.2<L>   |  29  |  0.71    Ca    8.5      17 Dec 2020 07:36  Phos  2.6     12-17  Mg     1.9     12-17    TPro  5.9<L>  /  Alb  2.1<L>  /  TBili  0.3  /  DBili  x   /  AST  8   /  ALT  11  /  AlkPhos  103  12-17    LIVER FUNCTIONS - ( 17 Dec 2020 07:36 )  Alb: 2.1 g/dL / Pro: 5.9 g/dL / ALK PHOS: 103 U/L / ALT: 11 U/L / AST: 8 U/L / GGT: x               MICROBIOLOGY:  .Blood Blood  12-16-20   No growth to date.  --  --      .Blood Blood-Venous  12-15-20   Growth in aerobic bottle: Methicillin resistant Staphylococcus aureus  See previous culture [id number]  Growth in anaerobic bottle: Gram Positive Cocci in Clusters  --    Growth in aerobic bottle: Gram Positive Cocci in Clusters  Growth in anaerobic bottle: Gram Positive Cocci in Clusters      .Blood Blood-Venous  12-15-20   Growth in aerobic and anaerobic bottles: Methicillin resistant  Staphylococcus aureus  See previous culture [id number]  --    Growth in anaerobic bottle: Gram Positive Cocci in Clusters  Growth in aerobic bottle: Gram Positive Cocci in Clusters      .Catheter PICC Tip  12-14-20   No growth  --  --      .Urine Catheterized  12-13-20   >100,000 CFU/ml Escherichia coli  --  Escherichia coli      .Blood Blood  12-13-20   Growth in aerobic and anaerobic bottles: Methicillin resistant  Staphylococcus aureus  "Due to technical problems, Proteus sp. and Pseudomonas aeruginosa will  Not be reported as part of the BCID panel  ***Blood Panel PCR results on this specimen are available  approximately 3 hours after the Gram stain result.***  Gram stain, PCR, and/or culture results may not always  correspond due to difference in methodologies.  ************************************************************  This PCR assay was performed using [employer].  The following targets are tested for: Enterococcus,  vancomycin resistant enterococci, Listeria monocytogenes,  coagulase negative staphylococci, S. aureus,  methicillin resistant S. aureus, Streptococcus agalactiae  (Group B), S. pneumoniae, S. pyogenes (Group A),  Acinetobacter baumannii, Enterobacter cloacae, E. coli,  Klebsiella oxytoca, K. pneumoniae, Proteus sp.,  Serratia marcescens, Haemophilus influenzae,  Neisseria meningitidis, Pseudomonas aeruginosa, Candida  albicans, C. glabrata, C krusei, C parapsilosis,  C. tropicalis and the KPC resistance gene.  --  Blood Culture PCR  Methicillin resistant Staphylococcus aureus      .Blood Blood  12-13-20   Growth in aerobic and anaerobic bottles: Methicillin resistant  Staphylococcus aureus See previous culture [id number]  --    Growth in aerobic bottle: Gram Positive Cocci in Clusters  Growth in anaerobic bottle: Gram Positive Cocci in Clusters      < from: Transthoracic Echocardiogram (12.16.20 @ 19:09) >  1. Mitral annular calcification, otherwise normal mitral  valve. Minimal mitral regurgitation.  2. Endocardium not well visualized; grossly normal left  ventricular systolic function.  3. The right ventricle is not well visualized; grossly  normal right ventricular systolic function.  Unable to exclude endocarditis.  Consider MAXX for further  evaluation, if clinically indicated.    < end of copied text >  RADIOLOGY    < from: CT Chest No Cont (12.16.20 @ 09:02) >  Several nodular opacities, some with central lucency/cavitation are noted in the peripheral aspect of both lungs. Primary consideration is infection, such as septic emboli.    Small pericardial and small bilateral pleural effusions.    < end of copied text >

## 2020-12-17 NOTE — BH CONSULTATION LIAISON ASSESSMENT NOTE - NSBHCONSULTRECOMMENDOTHER_PSY_A_CORE FT
Recommendation:   - Patient likely has some baseline cognitive impairment since ICH, unknown if this onset of agitation is delirium or dementia w/ behavioural issues (sundowning)  - Since patient was not on schedule regiment in [place], will hold of on starting new Rx  - If needed Haldol 0.25mg q6hr PRN for agitation, if QTc < 500msec  - Recommend Palliative consult  No psychiatric contraindications for d/c as long as behaviors under control, no prn's for combative behaviors and a safe d/c plan coordinated with family

## 2020-12-17 NOTE — BH CONSULTATION LIAISON ASSESSMENT NOTE - SUMMARY
Patient is a 89 y/o M w/ PMH of HTN, DM, [place], h/o chronic ETOH abuse, ICH s/p fall and DKA which was recently treated at [facility] on 11/3/20, on Keppra for seizure ppx, dysphagia s/p PEG placement and chronic zamudio who presented to the ED for dislodged PEG Tube. On arrival to ED, patient was found to be septic and patient was started on broad spectrum antibiotics, found to have MRSA Bacteremia, source unknown. Currently be treated medically for all acute issues. Patient is a 89 y/o M w/ PMH of HTN, DM, [place], h/o chronic ETOH abuse, ICH s/p fall and DKA which was recently treated at [facility] on 11/3/20, on Keppra for seizure ppx, dysphagia s/p PEG placement and chronic zamudio who presented to the ED for dislodged PEG Tube. On arrival to ED, patient was found to be septic and patient was started on broad spectrum antibiotics, found to have MRSA Bacteremia, source unknown. Currently be treated medically for all acute issues. Psychiatry was consulted for agitation management.     Cachectic, unwell looking patient, who appears to be declining cognitively, functionally and medically. Not aggressive but can try to get out of bed. Staff states that he is redirectable and is not a acute behavioral issue. Suspecting acute delirium, with baseline already declined cognitive status    Recommendation:   - No indication for a 1:1CO  - Patient was not on schedule psychotropic regimen in [place] and is not an acute behavioral issue other than trying to get out of bed unassisted- No indication to start a standing psychotropic  - Melatonin 3mg q 8pm PO.  - If needed ONLY FOR COMBATIVE BEHAVIORS- Haldol 0.25mg q6hr PRN IM/IV-  if QTc < 500msec  - Recommend Palliative consult  - No psychiatric contraindications for d/c as long as behaviors under control, no prn's for combative behaviors and a safe d/c plan coordinated with family

## 2020-12-17 NOTE — BH CONSULTATION LIAISON ASSESSMENT NOTE - NSBHCHARTREVIEWVS_PSY_A_CORE FT
Vital Signs Last 24 Hrs  T(C): 36.9 (17 Dec 2020 14:08), Max: 37.8 (17 Dec 2020 06:54)  T(F): 98.4 (17 Dec 2020 14:08), Max: 100.1 (17 Dec 2020 06:54)  HR: 84 (17 Dec 2020 14:08) (77 - 89)  BP: 149/70 (17 Dec 2020 14:08) (147/65 - 153/60)  BP(mean): --  RR: 19 (17 Dec 2020 14:08) (18 - 19)  SpO2: 97% (17 Dec 2020 14:08) (97% - 98%)

## 2020-12-17 NOTE — BH CONSULTATION LIAISON ASSESSMENT NOTE - RISK ASSESSMENT
chronic risks but not in imminent risk to intentionally hurt self or others- older male, declining cognition, delirium, no aggression, confused, unable to make needs known

## 2020-12-17 NOTE — PROGRESS NOTE ADULT - ATTENDING COMMENTS
[place] was able to tell me his name and was able to follow simple commands.   89 yo M w/ hx DM, [place], ICH s/p fall and DKA, Decubitus ulcer, chronic zamudio p/w dislodged PEG. PEG replaced and confirmed. On routine labs was found to have a  elevated WBC found to have LL PNA on Xray. Bcx- Gram positive cocci clusters PCR + MRSA Ucx-Ecoli. CT chest with nodular opacity posible cavitation  Sepsis secondary to UTI vs PNA vs line infection (unlikely PIV cx neg) -repeat Bcx- in lab 12/16                                                - vanco/zosyn                                                - TTE eval for endocarditis case d/w ID recommend MAXX                                                - vanco trough 10.5 increased dose to 750 q12 repeat vanco trough                                                -12/13 and 12/15 Bcx +mRSA/ IV line Cx- NGTD                                                 - case d/w Landen brother was at [facility] in [place] was transferred to [facility]. will need records for [place]. awaiting records   Decubitus Ulcer-sacral stage 3 wound care as per recs  protein calorie malnutrition -PEG tube in place and confirmed start Glucerna 35 ml/hr as per nutrition Hypernatremia- increase free water 250 q 4 monitor Na  DM- FS q6 ISS A1c 8.8 increase lantus 15U  ISS q6   Prior ICH- cont keppra  Anemia- fe studies reviewed retic count low likely in setting of infection. rectal exam consistent with brown stool keep H/H>7/21

## 2020-12-17 NOTE — BH CONSULTATION LIAISON ASSESSMENT NOTE - HPI (INCLUDE ILLNESS QUALITY, SEVERITY, DURATION, TIMING, CONTEXT, MODIFYING FACTORS, ASSOCIATED SIGNS AND SYMPTOMS)
Patient is a 89 y/o M w/ PMH of HTN, DM, [place] ([place]), h/o chronic ETOH abuse, ICH s/p fall and DKA which was recently treated at [facility] on 11/3/20, on Keppra for seizure ppx, dysphagia s/p PEG placement and chronic zamudio who presented to the ED for dislodged PEG Tube. History is obtained from chart review, unable to contact brotherCooper for additional history.     Patient was supposedly fully functional prior to his fall and ICH. Patient baseline after ICH from fall was AOx0 with severe dysphagia requiring PEG tube placement. As patient was not improving and unable to discharged back home, patient was transferred to [facility] for rehab. While in rehab, pt's PEG tube was dislodged however replaced at [facility]. Patient was then transferred to [facility] to verify PEG tube placement.     On arrival to ED, patient was found to be septic and patient was started on broad spectrum antibiotics, found to have MRSA Bacteremia, source unknown. Patient is currently on vancomycin and pending echo.     Today, patient was awake, able to answer simple questions with yes or no, denies any pain, unable to speak his name however attempts. Psych was consulted for agitation.  no Patient is a 89 y/o M w/ PMH of HTN, DM, [place], h/o chronic ETOH abuse, ICH s/p fall and DKA which was recently treated at [facility] on 11/3/20, on Keppra for seizure ppx, dysphagia s/p PEG placement and chronic zamudio who presented to the ED for dislodged PEG Tube. Psychiatry consulted for agitation. History is obtained from chart review, unable to contact brotherCooper for additional history.     Patient was supposedly fully functional prior to his fall and ICH. Patient baseline after ICH from fall was AOx0 with severe dysphagia requiring PEG tube placement. As patient was not improving and unable to discharged back home, patient was transferred to [facility] for rehab. While in rehab, pt's PEG tube was dislodged however replaced at [facility]. Patient was then transferred to [facility] to verify PEG tube placement.     On arrival to ED, patient was found to be septic and patient was started on broad spectrum antibiotics, found to have MRSA Bacteremia, source unknown. Patient is currently on vancomycin and pending echo.     Today, patient was awake, able to answer simple questions with yes or no, denies any pain, unable to speak his name however attempts.

## 2020-12-17 NOTE — PROGRESS NOTE ADULT - ASSESSMENT
89M, bedbound resident at  [facility] PMH of HTN, DM, h/o chronic ETOH abuse, ICH s/p fall (recently treated at [facility] on 11/3/20 for ICH s/p fall & DKA) started on Keppra for seizure ppx, dysphagia s/p PEG placement and chronic zamudio admitted for sepsis. Of note pt has a PICC line for unclear reasons. Pt found to be bacteremic, blood culture growing MRSA.    #MRSA bacteremia  - blood cultures growing MRSA x2 bottles 12/13, source likely PICC line  - Stage 3 pressure ulcer does not appear acutely infected  - CT chest showing several peripheral nodular opacities some with lucency/cavitations, suspicious for septic emboli    Plan:  - continue vanc 500mg q12h  - DC zosyn  - repeat blood cultures negative so far  - f/u vanc trough levels - keep 15-20  - TTE noted, if bcx not clearing, consider MAXX  - pt too high risk for any cardiac procedure  - likely will treat for 6 weeks with iv abx given likely septic emboli in lungs  - CT chest with opacities suspicious for septic emboli  - appreciate wound care recs

## 2020-12-17 NOTE — BH CONSULTATION LIAISON ASSESSMENT NOTE - CURRENT MEDICATION
MEDICATIONS  (STANDING):  chlorhexidine 4% Liquid 1 Application(s) Topical daily  dextrose 40% Gel 15 Gram(s) Oral once  dextrose 5%. 1000 milliLiter(s) (50 mL/Hr) IV Continuous <Continuous>  dextrose 5%. 1000 milliLiter(s) (100 mL/Hr) IV Continuous <Continuous>  dextrose 50% Injectable 25 Gram(s) IV Push once  dextrose 50% Injectable 12.5 Gram(s) IV Push once  dextrose 50% Injectable 25 Gram(s) IV Push once  glucagon  Injectable 1 milliGRAM(s) IntraMuscular once  insulin glargine Injectable (LANTUS) 15 Unit(s) SubCutaneous at bedtime  insulin lispro (ADMELOG) corrective regimen sliding scale   SubCutaneous every 6 hours  levETIRAcetam 500 milliGRAM(s) Oral two times a day  melatonin 3 milliGRAM(s) Oral at bedtime  sodium chloride 0.45%. 1000 milliLiter(s) (75 mL/Hr) IV Continuous <Continuous>  sodium chloride 0.9% lock flush 3 milliLiter(s) IV Push every 8 hours  thiamine 100 milliGRAM(s) Oral daily  vancomycin  IVPB 750 milliGRAM(s) IV Intermittent every 12 hours    MEDICATIONS  (PRN):  haloperidol    Injectable 0.25 milliGRAM(s) IV Push every 6 hours PRN Agitation

## 2020-12-17 NOTE — PROGRESS NOTE ADULT - PROBLEM SELECTOR PLAN 6
hx of ICH, hospitalized at [facility] in November 2/2 fall and traumatic ICH  continue Keppra for seizure prophylaxis

## 2020-12-17 NOTE — BH CONSULTATION LIAISON ASSESSMENT NOTE - NSBHCONSFOLLOWNEEDS_PSY_ALL_CORE
Needs further psych safety assessment prior to discharge No psychiatric contraindications to discharge

## 2020-12-17 NOTE — PROGRESS NOTE ADULT - PROBLEM SELECTOR PLAN 1
Sepsis secondary to unclear source- most likely sacral ulcer vs endocarditis less likely UTI, Pneumonia or Midline  Bcx positive for MRSA, Ucx with Ecoli, CT chest with peripheral opacities and cavitation suspicious for septic emboli, Midline culture NGTD  TTE inconclusive for endocarditis  Follow up ID re need for MAXX for endocarditis

## 2020-12-17 NOTE — BH CONSULTATION LIAISON ASSESSMENT NOTE - NSBHCHARTREVIEWLAB_PSY_A_CORE FT
Labs                        8.0    23.26 )-----------( 257      ( 17 Dec 2020 08:56 )             25.2     12-17    147<H>  |  112<H>  |  25<H>  ----------------------------<  213<H>  3.2<L>   |  29  |  0.71    Ca    8.5      17 Dec 2020 07:36  Phos  2.6     12-17  Mg     1.9     12-17    TPro  5.9<L>  /  Alb  2.1<L>  /  TBili  0.3  /  DBili  x   /  AST  8   /  ALT  11  /  AlkPhos  103  12-17

## 2020-12-17 NOTE — BH CONSULTATION LIAISON ASSESSMENT NOTE - CASE SUMMARY
met with the patient with Dr Ramsey, impression and plan discussed and agreed upon. Patient is essentially non verbal, with a few garbled speech. Calm now, and staff states that he is not an acute behavioral issue. Frail, cachectic and unable to make needs known.   Agree with above documented recommendations. Please get palliative medicine involved- symptom management (delirium, dysphagia, infection, sacral ulcer, frail)-is patient hospice eligible?

## 2020-12-18 LAB
ALBUMIN SERPL ELPH-MCNC: 2.3 G/DL — LOW (ref 3.3–5)
ALP SERPL-CCNC: 95 U/L — SIGNIFICANT CHANGE UP (ref 40–120)
ALT FLD-CCNC: 13 U/L — SIGNIFICANT CHANGE UP (ref 4–41)
ANION GAP SERPL CALC-SCNC: 7 MMOL/L — SIGNIFICANT CHANGE UP (ref 7–14)
AST SERPL-CCNC: 11 U/L — SIGNIFICANT CHANGE UP (ref 4–40)
BASOPHILS # BLD AUTO: 0.09 K/UL — SIGNIFICANT CHANGE UP (ref 0–0.2)
BASOPHILS NFR BLD AUTO: 0.3 % — SIGNIFICANT CHANGE UP (ref 0–2)
BILIRUB SERPL-MCNC: 0.4 MG/DL — SIGNIFICANT CHANGE UP (ref 0.2–1.2)
BUN SERPL-MCNC: 23 MG/DL — SIGNIFICANT CHANGE UP (ref 7–23)
CALCIUM SERPL-MCNC: 8.6 MG/DL — SIGNIFICANT CHANGE UP (ref 8.4–10.5)
CHLORIDE SERPL-SCNC: 112 MMOL/L — HIGH (ref 98–107)
CO2 SERPL-SCNC: 27 MMOL/L — SIGNIFICANT CHANGE UP (ref 22–31)
CREAT SERPL-MCNC: 0.71 MG/DL — SIGNIFICANT CHANGE UP (ref 0.5–1.3)
EOSINOPHIL # BLD AUTO: 0.71 K/UL — HIGH (ref 0–0.5)
EOSINOPHIL NFR BLD AUTO: 2.6 % — SIGNIFICANT CHANGE UP (ref 0–6)
GLUCOSE SERPL-MCNC: 52 MG/DL — CRITICAL LOW (ref 70–99)
HCT VFR BLD CALC: 26.1 % — LOW (ref 39–50)
HGB BLD-MCNC: 8 G/DL — LOW (ref 13–17)
IANC: 21.62 K/UL — HIGH (ref 1.5–8.5)
IMM GRANULOCYTES NFR BLD AUTO: 2.2 % — HIGH (ref 0–1.5)
LYMPHOCYTES # BLD AUTO: 10.6 % — LOW (ref 13–44)
LYMPHOCYTES # BLD AUTO: 2.88 K/UL — SIGNIFICANT CHANGE UP (ref 1–3.3)
MAGNESIUM SERPL-MCNC: 2 MG/DL — SIGNIFICANT CHANGE UP (ref 1.6–2.6)
MCHC RBC-ENTMCNC: 30.7 GM/DL — LOW (ref 32–36)
MCHC RBC-ENTMCNC: 31.3 PG — SIGNIFICANT CHANGE UP (ref 27–34)
MCV RBC AUTO: 102 FL — HIGH (ref 80–100)
MONOCYTES # BLD AUTO: 1.37 K/UL — HIGH (ref 0–0.9)
MONOCYTES NFR BLD AUTO: 5 % — SIGNIFICANT CHANGE UP (ref 2–14)
NEUTROPHILS # BLD AUTO: 21.62 K/UL — HIGH (ref 1.8–7.4)
NEUTROPHILS NFR BLD AUTO: 79.3 % — HIGH (ref 43–77)
NRBC # BLD: 0 /100 WBCS — SIGNIFICANT CHANGE UP
NRBC # FLD: 0 K/UL — SIGNIFICANT CHANGE UP
PHOSPHATE SERPL-MCNC: 2.8 MG/DL — SIGNIFICANT CHANGE UP (ref 2.5–4.5)
PLATELET # BLD AUTO: 257 K/UL — SIGNIFICANT CHANGE UP (ref 150–400)
POTASSIUM SERPL-MCNC: 3.5 MMOL/L — SIGNIFICANT CHANGE UP (ref 3.5–5.3)
POTASSIUM SERPL-SCNC: 3.5 MMOL/L — SIGNIFICANT CHANGE UP (ref 3.5–5.3)
PROT SERPL-MCNC: 6.1 G/DL — SIGNIFICANT CHANGE UP (ref 6–8.3)
RBC # BLD: 2.56 M/UL — LOW (ref 4.2–5.8)
RBC # FLD: 16.2 % — HIGH (ref 10.3–14.5)
SODIUM SERPL-SCNC: 146 MMOL/L — HIGH (ref 135–145)
TSH SERPL-MCNC: 3.89 UIU/ML — SIGNIFICANT CHANGE UP (ref 0.27–4.2)
VANCOMYCIN TROUGH SERPL-MCNC: 14 UG/ML — SIGNIFICANT CHANGE UP (ref 10–20)
WBC # BLD: 27.26 K/UL — HIGH (ref 3.8–10.5)
WBC # FLD AUTO: 27.26 K/UL — HIGH (ref 3.8–10.5)

## 2020-12-18 PROCEDURE — 99233 SBSQ HOSP IP/OBS HIGH 50: CPT | Mod: GC

## 2020-12-18 PROCEDURE — 99232 SBSQ HOSP IP/OBS MODERATE 35: CPT

## 2020-12-18 PROCEDURE — 99223 1ST HOSP IP/OBS HIGH 75: CPT

## 2020-12-18 PROCEDURE — 71045 X-RAY EXAM CHEST 1 VIEW: CPT | Mod: 26

## 2020-12-18 PROCEDURE — 93010 ELECTROCARDIOGRAM REPORT: CPT

## 2020-12-18 RX ORDER — DEXTROSE 50 % IN WATER 50 %
25 SYRINGE (ML) INTRAVENOUS ONCE
Refills: 0 | Status: COMPLETED | OUTPATIENT
Start: 2020-12-18 | End: 2020-12-18

## 2020-12-18 RX ORDER — ENOXAPARIN SODIUM 100 MG/ML
30 INJECTION SUBCUTANEOUS DAILY
Refills: 0 | Status: DISCONTINUED | OUTPATIENT
Start: 2020-12-18 | End: 2020-12-23

## 2020-12-18 RX ORDER — ENOXAPARIN SODIUM 100 MG/ML
40 INJECTION SUBCUTANEOUS DAILY
Refills: 0 | Status: DISCONTINUED | OUTPATIENT
Start: 2020-12-18 | End: 2020-12-18

## 2020-12-18 RX ORDER — VANCOMYCIN HCL 1 G
1000 VIAL (EA) INTRAVENOUS EVERY 12 HOURS
Refills: 0 | Status: DISCONTINUED | OUTPATIENT
Start: 2020-12-18 | End: 2020-12-23

## 2020-12-18 RX ORDER — SODIUM CHLORIDE 9 MG/ML
1000 INJECTION, SOLUTION INTRAVENOUS
Refills: 0 | Status: DISCONTINUED | OUTPATIENT
Start: 2020-12-18 | End: 2020-12-18

## 2020-12-18 RX ORDER — DEXTROSE 50 % IN WATER 50 %
25 SYRINGE (ML) INTRAVENOUS ONCE
Refills: 0 | Status: DISCONTINUED | OUTPATIENT
Start: 2020-12-18 | End: 2020-12-23

## 2020-12-18 RX ORDER — INSULIN GLARGINE 100 [IU]/ML
12 INJECTION, SOLUTION SUBCUTANEOUS AT BEDTIME
Refills: 0 | Status: DISCONTINUED | OUTPATIENT
Start: 2020-12-18 | End: 2020-12-23

## 2020-12-18 RX ADMIN — LEVETIRACETAM 500 MILLIGRAM(S): 250 TABLET, FILM COATED ORAL at 17:20

## 2020-12-18 RX ADMIN — SODIUM CHLORIDE 50 MILLILITER(S): 9 INJECTION, SOLUTION INTRAVENOUS at 12:15

## 2020-12-18 RX ADMIN — SODIUM CHLORIDE 3 MILLILITER(S): 9 INJECTION INTRAMUSCULAR; INTRAVENOUS; SUBCUTANEOUS at 22:20

## 2020-12-18 RX ADMIN — Medication 100 MILLIGRAM(S): at 12:16

## 2020-12-18 RX ADMIN — Medication 250 MILLIGRAM(S): at 12:14

## 2020-12-18 RX ADMIN — LEVETIRACETAM 500 MILLIGRAM(S): 250 TABLET, FILM COATED ORAL at 05:15

## 2020-12-18 RX ADMIN — ENOXAPARIN SODIUM 30 MILLIGRAM(S): 100 INJECTION SUBCUTANEOUS at 13:22

## 2020-12-18 RX ADMIN — Medication 2: at 00:56

## 2020-12-18 RX ADMIN — SODIUM CHLORIDE 3 MILLILITER(S): 9 INJECTION INTRAMUSCULAR; INTRAVENOUS; SUBCUTANEOUS at 17:21

## 2020-12-18 RX ADMIN — Medication 25 GRAM(S): at 06:59

## 2020-12-18 RX ADMIN — Medication 3 MILLIGRAM(S): at 22:21

## 2020-12-18 RX ADMIN — HALOPERIDOL DECANOATE 0.25 MILLIGRAM(S): 100 INJECTION INTRAMUSCULAR at 01:14

## 2020-12-18 RX ADMIN — Medication 2: at 18:41

## 2020-12-18 RX ADMIN — INSULIN GLARGINE 12 UNIT(S): 100 INJECTION, SOLUTION SUBCUTANEOUS at 22:31

## 2020-12-18 RX ADMIN — SODIUM CHLORIDE 3 MILLILITER(S): 9 INJECTION INTRAMUSCULAR; INTRAVENOUS; SUBCUTANEOUS at 05:15

## 2020-12-18 RX ADMIN — CHLORHEXIDINE GLUCONATE 1 APPLICATION(S): 213 SOLUTION TOPICAL at 17:21

## 2020-12-18 NOTE — PROGRESS NOTE ADULT - SUBJECTIVE AND OBJECTIVE BOX
Darnell Elkins MD PGY1   Team 7  Contact/Pager - [phone]      Patient is a 89y old  Male who presents with a chief complaint of PEG tube dislodgement (18 Dec 2020 10:27)        SUBJECTIVE / OVERNIGHT EVENTS: Agitated overnight. Required Haldol IV PRN. Hypoglycemic this AM to 52. Received OJ through PEG. Repeat finger stick 190. Patient also with copious suctioned secretions. Tube feeds briefly discontinued pending CXR for possible aspiration event. Tube feeds restarted after CXR showed no signs of aspiration and the patient was no longer in respiratory distress. Patient was arousable to voice. Not able to speak clearly. Not able to follow commands.      MEDICATIONS  (STANDING):  chlorhexidine 4% Liquid 1 Application(s) Topical daily  dextrose 40% Gel 15 Gram(s) Oral once  dextrose 5%. 1000 milliLiter(s) (50 mL/Hr) IV Continuous <Continuous>  dextrose 5%. 1000 milliLiter(s) (100 mL/Hr) IV Continuous <Continuous>  dextrose 50% Injectable 25 Gram(s) IV Push once  dextrose 50% Injectable 25 Gram(s) IV Push once  dextrose 50% Injectable 12.5 Gram(s) IV Push once  dextrose 50% Injectable 25 Gram(s) IV Push once  enoxaparin Injectable 30 milliGRAM(s) SubCutaneous daily  glucagon  Injectable 1 milliGRAM(s) IntraMuscular once  insulin glargine Injectable (LANTUS) 12 Unit(s) SubCutaneous at bedtime  insulin lispro (ADMELOG) corrective regimen sliding scale   SubCutaneous every 6 hours  levETIRAcetam 500 milliGRAM(s) Oral two times a day  melatonin 3 milliGRAM(s) Oral at bedtime  sodium chloride 0.45%. 1000 milliLiter(s) (75 mL/Hr) IV Continuous <Continuous>  sodium chloride 0.9% lock flush 3 milliLiter(s) IV Push every 8 hours  thiamine 100 milliGRAM(s) Oral daily  vancomycin  IVPB 1000 milliGRAM(s) IV Intermittent every 12 hours    MEDICATIONS  (PRN):  haloperidol    Injectable 0.25 milliGRAM(s) IV Push every 6 hours PRN Agitation      Allergies    No Known Allergies    Intolerances          Vital Signs Last 24 Hrs  T(C): 37 (18 Dec 2020 05:16), Max: 37 (17 Dec 2020 21:40)  T(F): 98.6 (18 Dec 2020 05:16), Max: 98.6 (17 Dec 2020 21:40)  HR: 81 (18 Dec 2020 05:16) (73 - 84)  BP: 165/56 (18 Dec 2020 05:16) (141/62 - 165/56)  BP(mean): --  RR: 19 (18 Dec 2020 05:16) (18 - 19)  SpO2: 95% (18 Dec 2020 05:16) (95% - 99%)  CAPILLARY BLOOD GLUCOSE      POCT Blood Glucose.: 123 mg/dL (18 Dec 2020 12:46)  POCT Blood Glucose.: 138 mg/dL (18 Dec 2020 09:05)  POCT Blood Glucose.: 189 mg/dL (18 Dec 2020 07:05)  POCT Blood Glucose.: 59 mg/dL (18 Dec 2020 06:48)  POCT Blood Glucose.: 62 mg/dL (18 Dec 2020 06:44)  POCT Blood Glucose.: 224 mg/dL (18 Dec 2020 00:50)  POCT Blood Glucose.: 261 mg/dL (17 Dec 2020 22:10)  POCT Blood Glucose.: 216 mg/dL (17 Dec 2020 17:55)    I&O's Summary    17 Dec 2020 07:01  -  18 Dec 2020 07:00  --------------------------------------------------------  IN: 850 mL / OUT: 251 mL / NET: 599 mL  PHYSICAL EXAM:  GENERAL: thin frail elderly  HEAD:  AT, NC  EYES: EOMI, PERRLA, conjunctiva and sclera clear  ENMT: Airway patent. dry mucus membranes, poor dentition, macroglossia  NECK: Supple,   CHEST/LUNG: CTABL; coarse breath sounds in all lung fields  HEART: RRR; Normal S1, S2. No murmurs, rubs, or gallops  ABDOMEN: Soft, NT, ND; Bowel sounds present. No organomegaly, PEG tube in place  GI/-zamudio inplace with yellow urine,   EXTREMITIES:  2+ Peripheral Pulses, No clubbing, cyanosis, or edema  PSYCH: intermittent agitation  NEUROLOGY: no obvious focal motor deficits  SKIN: sacral decubitus        LABS:                        8.0    27.26 )-----------( 257      ( 18 Dec 2020 05:48 )             26.1     12-18    146<H>  |  112<H>  |  23  ----------------------------<  52<LL>  3.5   |  27  |  0.71    Ca    8.6      18 Dec 2020 05:48  Phos  2.8     12-18  Mg     2.0     12-18    TPro  6.1  /  Alb  2.3<L>  /  TBili  0.4  /  DBili  x   /  AST  11  /  ALT  13  /  AlkPhos  95  12-18    LIVER FUNCTIONS - ( 18 Dec 2020 05:48 )  Alb: 2.3 g/dL / Pro: 6.1 g/dL / ALK PHOS: 95 U/L / ALT: 13 U/L / AST: 11 U/L / GGT: x                       Culture - Blood (collected 16 Dec 2020 14:25)  Source: .Blood Blood-Peripheral  Preliminary Report (17 Dec 2020 15:01):    No growth to date.    Culture - Blood (collected 16 Dec 2020 14:25)  Source: .Blood Blood  Preliminary Report (17 Dec 2020 15:01):    No growth to date.          RADIOLOGY & ADDITIONAL TESTS:    Imaging Personally Reviewed: YES    Consultant(s) Notes Reviewed: YES    Care Discussed with Consultants/Other Providers: YES

## 2020-12-18 NOTE — CHART NOTE - NSCHARTNOTEFT_GEN_A_CORE
Discussed the patient's condition with his surrogate decision maker Landen. Reviewed the risks and benefits of resuming full anticoagulation for stroke prevention with Landen. Landen defers judgement to the medical team regarding initiation of anticoagulation. Given the patient's limited prognosis for meaningful recovery and the risks of precipitating a bleeding event the decision was reached to hold full anticoagulation dosing.

## 2020-12-18 NOTE — PROGRESS NOTE ADULT - PROBLEM SELECTOR PLAN 6
hx of ICH, hospitalized at [facility] in November 2/2 fall and traumatic ICH  continue Keppra for seizure prophylaxis.

## 2020-12-18 NOTE — CONSULT NOTE ADULT - SUBJECTIVE AND OBJECTIVE BOX
MRN-[id number]  Patient is a 89y old  Male who presents with a chief complaint of PEG tube dislodgement (18 Dec 2020 08:54)    HPI:  89 y/o male with PMHx significant for a-fib, HTN, DM, [place], h/o chronic ETOH abuse, traumatic ICH, dysphagia s/p PEG placement presents to [facility] after his PEG tube was dislodged. He was found to be septic of unclear source. Infectious workup thus far has revealed positive blood cultures for MRSA, CT chest revealing nodular opacities with cavitation, concerning for possible septic emboli. TTE did not reveal endocarditis.     Neurology consulted for CT head findings of R STACIE infarct, b/l subdural collections     At baseline, patient is bedbound, resident at  [facility] [place]. He was recently treated at [facility] on 11/3/20 for ICH s/p fall & DKA. He was also started on Keppra for seizure ppx.      PAST MEDICAL & SURGICAL HISTORY:  ETOH abuse  chronic ETOH abuse    Dysphagia  s/p PEG placement    CAD (coronary artery disease)    HTN (hypertension)    DM2 (diabetes mellitus, type 2)    ICH (intracerebral hemorrhage)  s/p fall    No significant past surgical history      FAMILY HISTORY:  No pertinent family history in first degree relatives      Social Hx:  Nonsmoker, no drug or alcohol use    Home Medications:  amLODIPine 10 mg oral tablet: 1 tab(s) by gastrostomy tube once a day (14 Dec 2020 11:43)  aspirin 81 mg oral tablet: 1 tab(s) by gastrostomy tube once a day (14 Dec 2020 11:43)  atorvastatin 80 mg oral tablet: 1 tab(s) by gastrostomy tube once a day (14 Dec 2020 11:43)  doxazosin 8 mg oral tablet: 1 tab(s) by gastrostomy tube once a day (14 Dec 2020 11:43)  folic acid 1 mg oral tablet: 1 tab(s) by gastrostomy tube once a day (14 Dec 2020 11:43)  HumuLIN N KwikPen 100 units/mL subcutaneous suspension: 3 unit(s) subcutaneous 3 times a day (14 Dec 2020 11:43)  hydrALAZINE 10 mg oral tablet: 1 tab(s) orally every 8 hours (14 Dec 2020 11:43)  levETIRAcetam 100 mg/mL oral solution: 500 milligram(s) by gastrostomy tube every 12 hours (14 Dec 2020 11:43)  Multiple Vitamins oral tablet: 1 tab(s) by gastrostomy tube once a day (14 Dec 2020 11:43)  Vitamin B-12 100 mcg oral tablet: 1 tab(s) by gastrostomy tube once a day (14 Dec 2020 11:43)    MEDICATIONS  (STANDING):  chlorhexidine 4% Liquid 1 Application(s) Topical daily  dextrose 40% Gel 15 Gram(s) Oral once  dextrose 5%. 1000 milliLiter(s) (50 mL/Hr) IV Continuous <Continuous>  dextrose 5%. 1000 milliLiter(s) (100 mL/Hr) IV Continuous <Continuous>  dextrose 5%. 1000 milliLiter(s) (50 mL/Hr) IV Continuous <Continuous>  dextrose 50% Injectable 25 Gram(s) IV Push once  dextrose 50% Injectable 25 Gram(s) IV Push once  dextrose 50% Injectable 12.5 Gram(s) IV Push once  dextrose 50% Injectable 25 Gram(s) IV Push once  enoxaparin Injectable 40 milliGRAM(s) SubCutaneous daily  glucagon  Injectable 1 milliGRAM(s) IntraMuscular once  insulin glargine Injectable (LANTUS) 12 Unit(s) SubCutaneous at bedtime  insulin lispro (ADMELOG) corrective regimen sliding scale   SubCutaneous every 6 hours  levETIRAcetam 500 milliGRAM(s) Oral two times a day  melatonin 3 milliGRAM(s) Oral at bedtime  sodium chloride 0.45%. 1000 milliLiter(s) (75 mL/Hr) IV Continuous <Continuous>  sodium chloride 0.9% lock flush 3 milliLiter(s) IV Push every 8 hours  thiamine 100 milliGRAM(s) Oral daily  vancomycin  IVPB 1000 milliGRAM(s) IV Intermittent every 12 hours    MEDICATIONS  (PRN):  haloperidol    Injectable 0.25 milliGRAM(s) IV Push every 6 hours PRN Agitation    Allergies  No Known Allergies    Intolerances      REVIEW OF SYSTEMS    ROS: Pertinent positives in HPI, all other ROS were reviewed and are negative.      Vital Signs Last 24 Hrs  T(C): 37 (18 Dec 2020 05:16), Max: 37 (17 Dec 2020 21:40)  T(F): 98.6 (18 Dec 2020 05:16), Max: 98.6 (17 Dec 2020 21:40)  HR: 81 (18 Dec 2020 05:16) (73 - 84)  BP: 165/56 (18 Dec 2020 05:16) (141/62 - 165/56)  BP(mean): --  RR: 19 (18 Dec 2020 05:16) (18 - 19)  SpO2: 95% (18 Dec 2020 05:16) (95% - 99%)    NEUROLOGICAL EXAM:  MS: AAOX3, fluent, attends b/l; recent and remote memory intact; normal attention, language and fund of knowledge.   CN: VFF, EOMI, PERRL, no CRISTHIAN, no APD,  V1-3 intact, no facial asymmetry, t/p midline, SCM/trap intact.  Eyes-Fundi: no papilledema.  Motor: Strength: 5/5 4x. Tone: normal. Bulk: normal. DTR 2+ symm.  Plantar flex b/l. Sensation: intact to LT/PP/Vibration/Position/Temperature 4x.   Coordination: intact 4x.   Gait:  Romberg negative, pull test negative; walks with narrow base, pivots in 2 steps.    NIHSS  mRS    Labs:   cbc                      8.0    27.26 )-----------( 257      ( 18 Dec 2020 05:48 )             26.1     [id number]    146<H>  |  112<H>  |  23  ----------------------------<  52<LL>  3.5   |  27  |  0.71    Ca    8.6      18 Dec 2020 05:48  Phos  2.8     12-18  Mg     2.0     12-18    TPro  6.1  /  Alb  2.3<L>  /  TBili  0.4  /  DBili  x   /  AST  11  /  ALT  13  /  AlkPhos  95  12-18    Coags  Lipids  A1C  CardiacMarkers    LFTsLIVER FUNCTIONS - ( 18 Dec 2020 05:48 )  Alb: 2.3 g/dL / Pro: 6.1 g/dL / ALK PHOS: 95 U/L / ALT: 13 U/L / AST: 11 U/L / GGT: x           UA  CSF  Immunological Labs    Radiology:  -CT Head:   < from: CT Head No Cont (12.17.20 @ 18:42) >  IMPRESSION:  Age indeterminate right STACIE distribution frontal lobe infarct.    No significant intracranial hemorrhage identified.    Bifrontal low-density extra-axial fluid which may represent prominent subarachnoid spaces, subdural hygromas or chronic subdural hematomas.    < end of copied text >   [id number]  Patient is a 89y old  Male who presents with a chief complaint of PEG tube dislodgement (18 Dec 2020 08:54)    HPI:  89 y/o male unknown handedness with PMHx significant for a-fib, HTN, DM, [place], h/o chronic ETOH abuse, traumatic ICH, dysphagia s/p PEG placement presents to [facility] after his PEG tube was dislodged. He was found to be septic of unclear source. Infectious workup thus far has revealed positive blood cultures for MRSA, CT chest revealing nodular opacities with cavitation, concerning for possible septic emboli. TTE did not reveal endocarditis.     Neurology consulted for CT head findings of R STACIE infarct, b/l subdural collections.     At baseline, patient is bedbound, resident at  [facility]. He was recently treated at [facility] on 11/3/20 for ICH s/p fall & DKA. He was also started on Keppra for seizure ppx.      PAST MEDICAL & SURGICAL HISTORY:  ETOH abuse  chronic ETOH abuse    Dysphagia  s/p PEG placement    CAD (coronary artery disease)    HTN (hypertension)    DM2 (diabetes mellitus, type 2)    ICH (intracerebral hemorrhage)  s/p fall    No significant past surgical history      FAMILY HISTORY:  No pertinent family history in first degree relatives      Social Hx:  Nonsmoker, no drug or alcohol use    Home Medications:  amLODIPine 10 mg oral tablet: 1 tab(s) by gastrostomy tube once a day (14 Dec 2020 11:43)  aspirin 81 mg oral tablet: 1 tab(s) by gastrostomy tube once a day (14 Dec 2020 11:43)  atorvastatin 80 mg oral tablet: 1 tab(s) by gastrostomy tube once a day (14 Dec 2020 11:43)  doxazosin 8 mg oral tablet: 1 tab(s) by gastrostomy tube once a day (14 Dec 2020 11:43)  folic acid 1 mg oral tablet: 1 tab(s) by gastrostomy tube once a day (14 Dec 2020 11:43)  HumuLIN N KwikPen 100 units/mL subcutaneous suspension: 3 unit(s) subcutaneous 3 times a day (14 Dec 2020 11:43)  hydrALAZINE 10 mg oral tablet: 1 tab(s) orally every 8 hours (14 Dec 2020 11:43)  levETIRAcetam 100 mg/mL oral solution: 500 milligram(s) by gastrostomy tube every 12 hours (14 Dec 2020 11:43)  Multiple Vitamins oral tablet: 1 tab(s) by gastrostomy tube once a day (14 Dec 2020 11:43)  Vitamin B-12 100 mcg oral tablet: 1 tab(s) by gastrostomy tube once a day (14 Dec 2020 11:43)    MEDICATIONS  (STANDING):  chlorhexidine 4% Liquid 1 Application(s) Topical daily  dextrose 40% Gel 15 Gram(s) Oral once  dextrose 5%. 1000 milliLiter(s) (50 mL/Hr) IV Continuous <Continuous>  dextrose 5%. 1000 milliLiter(s) (100 mL/Hr) IV Continuous <Continuous>  dextrose 5%. 1000 milliLiter(s) (50 mL/Hr) IV Continuous <Continuous>  dextrose 50% Injectable 25 Gram(s) IV Push once  dextrose 50% Injectable 25 Gram(s) IV Push once  dextrose 50% Injectable 12.5 Gram(s) IV Push once  dextrose 50% Injectable 25 Gram(s) IV Push once  enoxaparin Injectable 40 milliGRAM(s) SubCutaneous daily  glucagon  Injectable 1 milliGRAM(s) IntraMuscular once  insulin glargine Injectable (LANTUS) 12 Unit(s) SubCutaneous at bedtime  insulin lispro (ADMELOG) corrective regimen sliding scale   SubCutaneous every 6 hours  levETIRAcetam 500 milliGRAM(s) Oral two times a day  melatonin 3 milliGRAM(s) Oral at bedtime  sodium chloride 0.45%. 1000 milliLiter(s) (75 mL/Hr) IV Continuous <Continuous>  sodium chloride 0.9% lock flush 3 milliLiter(s) IV Push every 8 hours  thiamine 100 milliGRAM(s) Oral daily  vancomycin  IVPB 1000 milliGRAM(s) IV Intermittent every 12 hours    MEDICATIONS  (PRN):  haloperidol    Injectable 0.25 milliGRAM(s) IV Push every 6 hours PRN Agitation    Allergies  No Known Allergies    Intolerances      REVIEW OF SYSTEMS    ROS: Pertinent positives in HPI, all other ROS were reviewed and are negative.      Vital Signs Last 24 Hrs  T(C): 37 (18 Dec 2020 05:16), Max: 37 (17 Dec 2020 21:40)  T(F): 98.6 (18 Dec 2020 05:16), Max: 98.6 (17 Dec 2020 21:40)  HR: 81 (18 Dec 2020 05:16) (73 - 84)  BP: 165/56 (18 Dec 2020 05:16) (141/62 - 165/56)  BP(mean): --  RR: 19 (18 Dec 2020 05:16) (18 - 19)  SpO2: 95% (18 Dec 2020 05:16) (95% - 99%)    NEUROLOGICAL EXAM:  MS: awake and alert, eyes open, follows no commands, no verbal output.   CN: decreased blink to threat from the right, EOMI, no facial asymmetry  Motor: at least some effort against gravity in all 4 extremities. Sensation: grimaces to noxious stimuli in all 4 extremities.    Coordination: unable to test   Gait:  not tested    NIHSS 18  mRS 5    Labs:   cbc                      8.0    27.26 )-----------( 257      ( 18 Dec 2020 05:48 )             26.1     [id number]    146<H>  |  112<H>  |  23  ----------------------------<  52<LL>  3.5   |  27  |  0.71    Ca    8.6      18 Dec 2020 05:48  Phos  2.8     12-18  Mg     2.0     12-18    TPro  6.1  /  Alb  2.3<L>  /  TBili  0.4  /  DBili  x   /  AST  11  /  ALT  13  /  AlkPhos  95  12-18    Coags  Lipids  A1C  CardiacMarkers    LFTsLIVER FUNCTIONS - ( 18 Dec 2020 05:48 )  Alb: 2.3 g/dL / Pro: 6.1 g/dL / ALK PHOS: 95 U/L / ALT: 13 U/L / AST: 11 U/L / GGT: x           UA  CSF  Immunological Labs    Radiology:  -CT Head:   < from: CT Head No Cont (12.17.20 @ 18:42) >  IMPRESSION:  Age indeterminate right STACIE distribution frontal lobe infarct.    No significant intracranial hemorrhage identified.    Bifrontal low-density extra-axial fluid which may represent prominent subarachnoid spaces, subdural hygromas or chronic subdural hematomas.    < end of copied text >

## 2020-12-18 NOTE — PROGRESS NOTE ADULT - SUBJECTIVE AND OBJECTIVE BOX
HERVE NGUYEN 89y [id number]    Patient is a 89y old  Male who presents with a chief complaint of PEG tube dislodgement (18 Dec 2020 10:27)      Follow Up/CC:  ID following for bacteremia    Interval History/ROS: no fever, nonverbal     Allergies    No Known Allergies    Intolerances        ANTIMICROBIALS:  vancomycin  IVPB 1000 every 12 hours      MEDICATIONS  (STANDING):  chlorhexidine 4% Liquid 1 Application(s) Topical daily  dextrose 40% Gel 15 Gram(s) Oral once  dextrose 5%. 1000 milliLiter(s) (50 mL/Hr) IV Continuous <Continuous>  dextrose 5%. 1000 milliLiter(s) (100 mL/Hr) IV Continuous <Continuous>  dextrose 50% Injectable 25 Gram(s) IV Push once  dextrose 50% Injectable 25 Gram(s) IV Push once  dextrose 50% Injectable 12.5 Gram(s) IV Push once  dextrose 50% Injectable 25 Gram(s) IV Push once  enoxaparin Injectable 30 milliGRAM(s) SubCutaneous daily  glucagon  Injectable 1 milliGRAM(s) IntraMuscular once  insulin glargine Injectable (LANTUS) 12 Unit(s) SubCutaneous at bedtime  insulin lispro (ADMELOG) corrective regimen sliding scale   SubCutaneous every 6 hours  levETIRAcetam 500 milliGRAM(s) Oral two times a day  melatonin 3 milliGRAM(s) Oral at bedtime  sodium chloride 0.45%. 1000 milliLiter(s) (75 mL/Hr) IV Continuous <Continuous>  sodium chloride 0.9% lock flush 3 milliLiter(s) IV Push every 8 hours  thiamine 100 milliGRAM(s) Oral daily  vancomycin  IVPB 1000 milliGRAM(s) IV Intermittent every 12 hours    MEDICATIONS  (PRN):  haloperidol    Injectable 0.25 milliGRAM(s) IV Push every 6 hours PRN Agitation        Vital Signs Last 24 Hrs  T(C): 37 (18 Dec 2020 05:16), Max: 37 (17 Dec 2020 21:40)  T(F): 98.6 (18 Dec 2020 05:16), Max: 98.6 (17 Dec 2020 21:40)  HR: 81 (18 Dec 2020 05:16) (73 - 81)  BP: 165/56 (18 Dec 2020 05:16) (141/62 - 165/56)  BP(mean): --  RR: 19 (18 Dec 2020 05:16) (18 - 19)  SpO2: 95% (18 Dec 2020 05:16) (95% - 99%)    CBC Full  -  ( 18 Dec 2020 05:48 )  WBC Count : 27.26 K/uL  RBC Count : 2.56 M/uL  Hemoglobin : 8.0 g/dL  Hematocrit : 26.1 %  Platelet Count - Automated : 257 K/uL  Mean Cell Volume : 102.0 fL  Mean Cell Hemoglobin : 31.3 pg  Mean Cell Hemoglobin Concentration : 30.7 gm/dL  Auto Neutrophil # : 21.62 K/uL  Auto Lymphocyte # : 2.88 K/uL  Auto Monocyte # : 1.37 K/uL  Auto Eosinophil # : 0.71 K/uL  Auto Basophil # : 0.09 K/uL  Auto Neutrophil % : 79.3 %  Auto Lymphocyte % : 10.6 %  Auto Monocyte % : 5.0 %  Auto Eosinophil % : 2.6 %  Auto Basophil % : 0.3 %    12-18    146<H>  |  112<H>  |  23  ----------------------------<  52<LL>  3.5   |  27  |  0.71    Ca    8.6      18 Dec 2020 05:48  Phos  2.8     12-18  Mg     2.0     12-18    TPro  6.1  /  Alb  2.3<L>  /  TBili  0.4  /  DBili  x   /  AST  11  /  ALT  13  /  AlkPhos  95  12-18    LIVER FUNCTIONS - ( 18 Dec 2020 05:48 )  Alb: 2.3 g/dL / Pro: 6.1 g/dL / ALK PHOS: 95 U/L / ALT: 13 U/L / AST: 11 U/L / GGT: x               MICROBIOLOGY:  .Blood Blood  12-16-20   No growth to date.  --  --      .Blood Blood-Venous  12-15-20   Growth in aerobic and anaerobic bottles: Methicillin resistant  Staphylococcus aureus  See previous culture [id number]  --    Growth in anaerobic bottle: Gram Positive Cocci in Clusters  Growth in aerobic bottle: Gram Positive Cocci in Clusters      .Catheter PICC Tip  12-14-20   No growth  --  --      .Urine Catheterized  12-13-20   >100,000 CFU/ml Escherichia coli  --  Escherichia coli      .Blood Blood  12-13-20   Growth in aerobic and anaerobic bottles: Methicillin resistant  Staphylococcus aureus  "Due to technical problems, Proteus sp. and Pseudomonas aeruginosa will  Not be reported as part of the BCID panel  ***Blood Panel PCR results on this specimen are available  approximately 3 hours after the Gram stain result.***  Gram stain, PCR, and/or culture results may not always  correspond due to difference in methodologies.  ************************************************************  This PCR assay was performed using [employer].  The following targets are tested for: Enterococcus,  vancomycin resistant enterococci, Listeria monocytogenes,  coagulase negative staphylococci, S. aureus,  methicillin resistant S. aureus, Streptococcus agalactiae  (Group B), S. pneumoniae, S. pyogenes (Group A),  Acinetobacter baumannii, Enterobacter cloacae, E. coli,  Klebsiella oxytoca, K. pneumoniae, Proteus sp.,  Serratia marcescens, Haemophilus influenzae,  Neisseria meningitidis, Pseudomonas aeruginosa, Candida  albicans, C. glabrata, C krusei, C parapsilosis,  C. tropicalis and the KPC resistance gene.  --  Blood Culture PCR  Methicillin resistant Staphylococcus aureus      .Blood Blood  12-13-20   Growth in aerobic and anaerobic bottles: Methicillin resistant  Staphylococcus aureus See previous culture [id number]  --    Growth in aerobic bottle: Gram Positive Cocci in Clusters  Growth in anaerobic bottle: Gram Positive Cocci in Clusters          Vancomycin Level, Trough: 14.0 ug/mL (12-18-20 @ 05:48)          < from: Transthoracic Echocardiogram (12.16.20 @ 19:09) >  1. Mitral annular calcification, otherwise normal mitral  valve. Minimal mitral regurgitation.  2. Endocardium not well visualized; grossly normal left  ventricular systolic function.  3. The right ventricle is not well visualized; grossly  normal right ventricular systolic function.  Unable to exclude endocarditis.  Consider MAXX for further  evaluation, if clinically indicated.    < end of copied text >    RADIOLOGY  < from: Xray Chest 1 View- PORTABLE-Urgent (Xray Chest 1 View- PORTABLE-Urgent .) (12.18.20 @ 10:01) >  The heart is normal in size.    Nodular opacities noted at the right upper and left lower lobe. These are better visualized on CT scan from 12/16/2020. Trace left pleural effusion. No pneumothorax.    No acute osseous abnormalities.    < end of copied text >

## 2020-12-18 NOTE — PROGRESS NOTE ADULT - ATTENDING COMMENTS
Amandeep Briceño  Attending Physician   Division of Infectious Disease  Pager #[phone]  After 5pm/weekend or no response, call #[phone]    Please call the ID service [phone] with questions or concerns over the weekend.

## 2020-12-18 NOTE — CONSULT NOTE ADULT - SUBJECTIVE AND OBJECTIVE BOX
[facility] DEPARTMENT OF OPHTHALMOLOGY - INITIAL ADULT CONSULT  -----------------------------------------------------------------------------------------------------  Agatha Dalton MD PGY-3  Pager: [phone]  ------------------------------------------------------------------------------------------------------    HPI: 89 y/o male, bedbound, resident at [facility] with MHx significant for HTN, DM, [place], h/o chronic ETOH abuse, ICH s/p fall (recently treated at [facility] on 11/3/20 for ICH s/p fall & DKA) started on Keppra for seizure ppx and dysphagia s/p PEG placement and chronic zamudio. While at [facility]'s PEG tube was dislodged s/p PEG replacement at \[facility]\"" transferred to [facility]. Ophthalmology consulted to assess for possible source of MRSA bactermia given recent biopsy. Patient had a punch biopsy of the right lower eyelid in November to assess for basal cell carcinoma, which was found to contain active and chronic inflammation, no evidence of basal cell carcinoma. The patient is unable to provide any information, not able to answer questions.     PAST MEDICAL & SURGICAL HISTORY:  chronic ETOH abuse  Dysphagia s/p PEG placement  CAD (coronary artery disease)  HTN (hypertension)  DM2 (diabetes mellitus, type 2)  ICH (intracerebral hemorrhage)    Past Ocular History: CE/PCIOL OU based on ocular exam    FAMILY HISTORY:  No pertinent family history in first degree relatives    Social History: resident at [facility]      MEDICATIONS  (STANDING):  chlorhexidine 4% Liquid 1 Application(s) Topical daily  dextrose 40% Gel 15 Gram(s) Oral once  dextrose 5%. 1000 milliLiter(s) (50 mL/Hr) IV Continuous <Continuous>  dextrose 5%. 1000 milliLiter(s) (100 mL/Hr) IV Continuous <Continuous>  dextrose 50% Injectable 25 Gram(s) IV Push once  dextrose 50% Injectable 12.5 Gram(s) IV Push once  dextrose 50% Injectable 25 Gram(s) IV Push once  dextrose 50% Injectable 25 Gram(s) IV Push once  enoxaparin Injectable 30 milliGRAM(s) SubCutaneous daily  glucagon  Injectable 1 milliGRAM(s) IntraMuscular once  insulin glargine Injectable (LANTUS) 12 Unit(s) SubCutaneous at bedtime  insulin lispro (ADMELOG) corrective regimen sliding scale   SubCutaneous every 6 hours  levETIRAcetam 500 milliGRAM(s) Oral two times a day  melatonin 3 milliGRAM(s) Oral at bedtime  sodium chloride 0.45%. 1000 milliLiter(s) (75 mL/Hr) IV Continuous <Continuous>  sodium chloride 0.9% lock flush 3 milliLiter(s) IV Push every 8 hours  thiamine 100 milliGRAM(s) Oral daily  vancomycin  IVPB 1000 milliGRAM(s) IV Intermittent every 12 hours    MEDICATIONS  (PRN):  haloperidol    Injectable 0.25 milliGRAM(s) IV Push every 6 hours PRN Agitation    Allergies & Intolerances: NKDA    Review of Systems: Unable to obtain given mental status    VITALS: T(C): 36.7 (12-18-20 @ 17:33)  T(F): 98 (12-18-20 @ 17:33), Max: 98.6 (12-17-20 @ 21:40)  HR: 60 (12-18-20 @ 17:33) (60 - 81)  BP: 142/55 (12-18-20 @ 17:33) (141/62 - 165/56)  RR:  (18 - 19)  SpO2:  (95% - 99%)  Wt(kg): --    Alert and oriented x 0-1    Ophthalmology Exam:  Visual acuity (sc): TRESSA 2/2 MS, blinks to light OU  Pupils: PERRL OU, no APD  Ttono: 14 OU  Extraocular movements (EOMs): grossly full  Confrontational Visual Field (CVF): TRESSA 2/2 MS  Color Plates: TRESSA 2/2 MS    Pen Light Exam (PLE)  External: Flat OU  Lids/Lashes/Lacrimal Ducts: dermatochalasis OU, no periorbital edema or erythema, no wounds visualized  Sclera/Conjunctiva: W+Q OU  Cornea: Cl OU  Anterior Chamber: D+F OU    Iris: Flat OU  Lens: PCIOL OU    No proptosis, no resistance to retropulsion    Fundus Exam: dilated with 1% tropicamide and 2.5% phenylephrine  Approval obtained from primary team for dilation  Patient aware that pupils can remained dilated for at least 4-6 hours  Exam performed with 20D lens    Vitreous: wnl OU  Disc, cup/disc: sharp and pink, 0.4 OU, PPA present OU, no edema or pallor OU  Macula: wnl OU  Vessels: wnl OU  Periphery: wnl OU

## 2020-12-18 NOTE — PROGRESS NOTE ADULT - PROBLEM SELECTOR PROBLEM 8
Discharge planning issues
Discharge planning issues
Type 2 diabetes mellitus with hyperglycemia, with long-term current use of insulin

## 2020-12-18 NOTE — PROGRESS NOTE ADULT - ATTENDING COMMENTS
89 yo M w/ hx DM, [place], ICH s/p fall and DKA, Decubitus ulcer, chronic zamudio p/w dislodged PEG. PEG replaced and confirmed. On routine labs was found to have a  elevated WBC found to have LL PNA on Xray. Bcx- Gram positive cocci clusters PCR + MRSA Ucx-Ecoli. CT chest with nodular opacity possible cavitation. ON noted to have copious secretions requiring suctioning   # Sepsis secondary to UTI/ PNA  -12/13 and 12/15 Bcx +MRSA/ IV line Cx- NGTD   -repeat Bcx- 12/16-NGTD  - TTE eval for endocarditis neg case. recommend MAXX pt not a candidate for any surgical procedure. MAXX unlikely to  will treat with 6 weeks of antibiotics.  - vanco trough 14.8 increase vanco 1 g q12 repeat vanco trough  - tube feeds held for now if CXR without acute infiltrate can restart tube feeds with cont aspiration precautions and suctioning   f/u ID recs  - review of hospitalization records indicate elevated WBC in 30K initial suspicion for meningitis stopped after LP. fluctuant eye lesion biopsied and Cx +MRSA   # DM- FS q6 ISS A1c 8.8 hypoglycemia ON will decrease lantus to 12 U. currently on D5. endo recs  # Hypernatremia- on D5 monitor Na. discontinue when feeds restarted   # Prior CVA- without overt deficit. cont keppra. CT head repeat records reviewed from [place] was admitted with suspected cardioembolic CVA also was noted to have ICH and also noted to have brief episode of afib not started on AC due to ICH. repeat CT head 12/17 Age indeterminate right STACIE distribution frontal lobe infarct. No significant intracranial hemorrhage identified. Bifrontal low-density extra-axial fluid which may represent prominent subarachnoid spaces, subdural hygromas or chronic subdural hematomas.   neuro consult in regards to possibly restarting AC.   # PAF- CHADSVASc score 6. currently rates controlled check EKG. f/u neuro recs  # Decubitus Ulcer-sacral stage 3 wound care as per recs  # protein calorie malnutrition -PEG tube in place and confirmed start Glucerna 35 ml/hr as per nutrition. tube feeds on hold for now due to increased secretion ON.   # Anemia- AOCD- fe studies reviewed retic count low likely in setting of infection. rectal exam consistent with brown stool keep H/H>7/21  # GOC- DNR/DNI 89 yo M w/ hx DM, [place], ICH s/p fall and DKA, Decubitus ulcer, chronic zamudio p/w dislodged PEG. PEG replaced and confirmed. On routine labs was found to have a  elevated WBC found to have LL PNA on Xray. Bcx- Gram positive cocci clusters PCR + MRSA Ucx-Ecoli. CT chest with nodular opacity possible cavitation. ON noted to have copious secretions requiring suctioning.   # Sepsis secondary to UTI/ PNA  -12/13 and 12/15 Bcx +MRSA/ IV line Cx- NGTD   -repeat Bcx- 12/16-NGTD  - TTE eval for endocarditis neg case. recommend MAXX pt not a candidate for any surgical procedure. MAXX unlikely to  will treat with 6 weeks of antibiotics.  - vanco trough 14.8 increase vanco 1 g q12. repeat vanco trough  - tube feeds held for now if CXR without acute infiltrate can restart tube feeds with cont aspiration precautions and suctioning   f/u ID recs  - review of hospitalization records indicate elevated WBC in 30K initial suspicion for meningitis stopped after LP. fluctuant eye lesion biopsied and Cx +MRSA. not dc'ed on antibiotics.   # DM- FS q6 ISS A1c 8.8 hypoglycemia ON will decrease lantus to 12 U. currently on D5. endo recs  # Hypernatremia- on D5 monitor Na. discontinue when feeds restarted   # Prior CVA- without overt deficit. cont keppra. CT head repeat records reviewed from [place] was admitted with suspected cardioembolic CVA also was noted to have ICH and also noted to have brief episode of afib not started on AC due to ICH. repeat CT head 12/17 Age indeterminate right STACIE distribution frontal lobe infarct. No significant intracranial hemorrhage identified. Bifrontal low-density extra-axial fluid which may represent prominent subarachnoid spaces, subdural hygromas or chronic subdural hematomas.   neuro consult in regards to possibly restarting AC.   # PAF- CHADSVASc score 6. currently rates controlled check EKG. f/u neuro recs  # Decubitus Ulcer-sacral stage 3 wound care as per recs  # protein calorie malnutrition -PEG tube in place and confirmed start Glucerna 35 ml/hr as per nutrition. tube feeds on hold for now due to increased secretion ON.   # Anemia- AOCD- fe studies reviewed retic count low likely in setting of infection. rectal exam consistent with brown stool keep H/H>7/21  # GOC- DNR/DNI 89 yo M w/ hx DM, [place], ICH s/p fall and DKA, Decubitus ulcer, chronic zamudio p/w dislodged PEG. PEG replaced and confirmed. On routine labs was found to have a  elevated WBC found to have LL PNA on Xray. Bcx- Gram positive cocci clusters PCR + MRSA Ucx-Ecoli. CT chest with nodular opacity possible cavitation concern for septic emboli. ON noted to have copious secretions requiring suctioning.   # Sepsis secondary to UTI/ PNA/Pheripheral IV/midline  -12/13 and 12/15 Bcx +MRSA/ IV line Cx- NGTD   - s/p zosyn for UTI  -repeat Bcx- 12/16-NGTD  - TTE eval for endocarditis neg. ID recommend MAXX. pt not a candidate for any surgical procedure. MAXX unlikely to  will treat with 6 weeks of antibiotics.  - vanco trough 14.8 increase vanco 1 g q12. repeat vanco trough  - tube feeds held for now if CXR without acute infiltrate can restart tube feeds with cont aspiration precautions and suctioning   f/u ID recs  - review of hospitalization records indicate elevated WBC in 30K initial suspicion for meningitis stopped after LP. fluctuant eye lesion biopsied and Cx +MRSA. not dc'ed on antibiotics.   - optho c/s   # DM- FS q6 ISS A1c 8.8 hypoglycemia ON will decrease lantus to 12 U. currently on D5. endo recs  # Hypernatremia- on D5 monitor Na. discontinue when feeds restarted.   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 cont keppra. CT head repeat records reviewed from [place] was admitted with suspected cardioembolic CVA also was noted to have ICH and also noted to have brief episode of afib not started on AC due to ICH. repeat CT head 12/17 Age indeterminate right STACIE distribution frontal lobe infarct. No significant intracranial hemorrhage identified. Bifrontal low-density extra-axial fluid which may represent prominent subarachnoid spaces, subdural hygromas or chronic subdural hematomas.   neuro consult in regards to AC.   # PAF- CHADSVASc score 6. currently rates controlled. check EKG.   # Decubitus Ulcer-sacral stage 3 wound care as per recs  # protein calorie malnutrition -PEG tube in place and confirmed start Glucerna 35 ml/hr as per nutrition. tube feeds on hold for now due to increased secretion ON.   # Anemia- AOCD- fe studies reviewed retic count low likely in setting of infection. rectal exam consistent with brown stool keep H/H>7/21  # GOC- DNR/DNI

## 2020-12-18 NOTE — PROGRESS NOTE ADULT - PROBLEM SELECTOR PLAN 1
Sepsis secondary to unclear source- most likely sacral ulcer vs endocarditis less vs Right Eye lesion, likely UTI, Pneumonia or Midline  Bcx positive for MRSA, Ucx with Ecoli, CT chest with peripheral opacities and cavitation suspicious for septic emboli, Midline culture NGTD  TTE inconclusive for endocarditis  No need for MAXX as would not   F/u optho and ID regarding right eye culture positve for MRSA at outside hospital

## 2020-12-18 NOTE — CONSULT NOTE ADULT - ASSESSMENT
89 y/o male, bedbound, resident at [facility] with MHx significant for HTN, DM, [place], h/o chronic ETOH abuse, ICH s/p fall, currently with MRSA bacteremia. Ophthalmology consulted to assess the eye and orbit for possible source of MRSA bactermia. Examination extremely limited given patient's mental status. On external examination, no periocular edema or erythema, no proptosis, thus no evidence of preseptal cellulitis. No eyelid skin wounds. Eyes white and quiet. DFE wnl.    MRSA bactermia  - Limited examination given patient's mental status but normal ocular exam. No evidence of preseptal cellulitis or eyelid lesion to be causing the bacteremia.   - Further work-up per primary team  - Findings discussed with primary team  - Please page ophthalmology if any new ocular findings    The patient can follow-up with [facility] within 1 week of discharge:  [address]  [place] [address]  [phone]

## 2020-12-18 NOTE — PROGRESS NOTE ADULT - PROBLEM SELECTOR PLAN 8
no DVT chemoprophylaxis due to recent ICH, manual compression devices  MOLST with DNR/DNI orders placed in chart after conversation with patient's brother Don
no DVT chemoprophylaxis due to recent ICH, manual compression devices  MOLST with DNR/DNI orders placed in chart after conversation with patient's brother Don
Fingerstick q 6 hrs  Insulin sliding scale  A1c in AM

## 2020-12-18 NOTE — CONSULT NOTE ADULT - ATTENDING COMMENTS
89-year-old ? handed gentleman first evaluated at [facility] on 12/18/2020 with change in mental status.  History and exam as above, with minor changes.  ROS otherwise negative.  Exam.  Cheyne-Osorio respiration; eyes open-alert but not attentive; no eye contact; no verbal output; follows no commands; forcefully closes eyes, unable to assess blink to threat; moves all extremities spontaneously but less movement in left leg; gait not tested; remainder of neurologic exam was nonfocal.  CT head (12/18/2020) to my eye showed a small, possibly subacute right STACIE infarct in the frontal region, callosomarginal distribution.  There were bilateral hemispheric subdural collections, either hygromas or chronic subdural hematomas.  Impression.  He has a history of atrial fibrillation.  About 6 weeks prior to admission he had head trauma with some type of intracranial hemorrhage (perhaps subdural hematomas but this is uncertain) with subsequent deterioration in his mental status.  He also developed dysphagia and required a PEG.  He was admitted to [facility] because his PEG had become dislodged.  He was found to be septic with positive blood cultures, with CT chest showing possible septic emboli raising the possibility of infective endocarditis, although TTE did not show vegetations.  His exam is consistent with a mild left hemiparesis primarily involving the leg, most likely related to a small right STACIE infarct, superimposed on delirium/encephalopathy, related to probable diffuse cerebral dysfunction, perhaps related to his previous head trauma and more recent sepsis and/or endocarditis.  CT head showed a small, possibly subacute right STACIE infarct of uncertain mechanism, but possibly cardioembolic related either to atrial fibrillation or endocarditis.  Given the absence of hemorrhagic transformation of the infarct and that the possible subdural hematomas are chronic, from the neurovascular standpoint, there is no contraindication to anticoagulation, for example with apixaban for secondary stroke prevention.  Of course, this depends on goals of care.  Given a very poor baseline functional status as well as his age, I do not believe that he will benefit from further neurovascular investigation.  Suggest.  Depending on goals of care, consider anticoagulation with apixaban; PT/OT if feasible and depending on goals of care. 89-year-old ? handed gentleman first evaluated at [facility] on 12/18/2020 with change in mental status.  History and exam as above, with minor changes.  ROS otherwise negative.  Exam.  Cheyne-Osorio respiration; eyes open-alert but not attentive; no eye contact; no verbal output; follows no commands; forcefully closes eyes, unable to assess blink to threat; moves all extremities spontaneously but less movement in left leg; note-recurrent probable clonic jerks of left arm; gait not tested; remainder of neurologic exam was nonfocal.  CT head (12/18/2020) to my eye showed a small, possibly subacute right STACIE infarct in the frontal region, callosomarginal distribution.  There were bilateral hemispheric subdural collections, either hygromas or chronic subdural hematomas.  Impression.  He has a history of atrial fibrillation.  About 6 weeks prior to admission he had head trauma with some type of intracranial hemorrhage (perhaps subdural hematomas but this is uncertain) with subsequent deterioration in his mental status.  He also developed dysphagia and required a PEG.  He was admitted to [facility] because his PEG had become dislodged.  He was found to be septic with positive blood cultures, with CT chest showing possible septic emboli raising the possibility of infective endocarditis, although TTE did not show vegetations.  His exam is consistent with a mild left hemiparesis primarily involving the leg, most likely related to a small right STACIE infarct, superimposed on delirium/encephalopathy, related to probable diffuse cerebral dysfunction, perhaps related to his previous head trauma and more recent sepsis and/or endocarditis.  CT head showed a small, possibly subacute right STACIE infarct of uncertain mechanism, but possibly cardioembolic related either to atrial fibrillation or endocarditis.  Given the absence of hemorrhagic transformation of the infarct and that the possible subdural hematomas are chronic, from the neurovascular standpoint, there is no contraindication to anticoagulation, for example with apixaban for secondary stroke prevention.  Of course, this depends on goals of care.  Given a very poor baseline functional status as well as his age, I do not believe that he will benefit from further neurovascular investigation. He appears to have clonic jerks of his L arm, c/w focal seizures, on Keppra 500 mg BID.  Suggest.  Depending on goals of care, consider anticoagulation with apixaban; increase Keppra to 750 mg BID; PT/OT if feasible and depending on goals of care.

## 2020-12-18 NOTE — PROGRESS NOTE ADULT - ASSESSMENT
89M, bedbound resident at  [facility] PMH of HTN, DM, h/o chronic ETOH abuse, ICH s/p fall (recently treated at [facility] on 11/3/20 for ICH s/p fall & DKA) started on Keppra for seizure ppx, dysphagia s/p PEG placement and chronic zamudio admitted for sepsis. Of note pt has a PICC line for unclear reasons. Pt found to be bacteremic, blood culture growing MRSA.    #MRSA bacteremia  - blood cultures growing MRSA x2 bottles 12/13, source likely PICC line  - Stage 3 pressure ulcer does not appear acutely infected  - CT chest showing several peripheral nodular opacities some with lucency/cavitations, suspicious for septic emboli    Plan:  - continue vanc 500mg q12h  - DC zosyn  - repeat blood cultures negative so far  - f/u vanc trough levels - keep 15-20  - TTE noted  - pt too high risk for any cardiac procedure  - likely will treat for 6 weeks with iv abx given likely septic emboli in lungs  - CT chest with opacities suspicious for septic emboli  - appreciate wound care recs  - consider palliative eval

## 2020-12-18 NOTE — CONSULT NOTE ADULT - ASSESSMENT
89 y/o male with PMHx significant for a-fib, HTN, DM, [place], h/o chronic ETOH abuse, traumatic ICH, dysphagia s/p PEG placement presents to [facility] after his PEG tube was dislodged. He was found to be septic, with unclear source, though CT chest reveals septic emboli, and BCx reveal MRSA. Neurological exam reveals ---. CT head shows bilateral subdural collections, and R STACIE infarct.    Impression: Etiology of STACIE infarct is likely cardioembolic in the setting of a-fib, though it is also possible that infarct is 2/2 septic emboli in the setting of possible endocarditis. Will need to rule out endocarditis. He appears to have bilateral subdural collections, which may represent chronic subdural hematomas.    Plan:  - Given that there is a possibility that patient has endocarditis, would avoid anticoagulation at this time given possibility of mycotic brain aneurysms and risks for rupture in the setting of anticoagulation. for now, recommend aspirin 81 mg for secondary stroke prevention.  - GOC discussion. for full evaluation of mycotic aneurysms, patient would need a conventional cerebral angiogram.  - f/u ID recommendations.  - would eventually need to touch base with neurosurgery regarding if subdural collections represent contraindication to anticoagulation  - Agree with pharmacological DVT prophylaxis   - Atorvastatin 80 mg at bedtime once enteral access is established; titrate the dose according to LDL  - HbA1C and LDL  - MRI brain without contrast   - MRA head and neck to evaluate for intracranial and extracranial cerebral vasculature   - MAXX with bubble study and continue with telemetry   - PT/OT/Speech and swallow/safety eval   89 y/o male with PMHx significant for a-fib, HTN, DM, [place], h/o chronic ETOH abuse, traumatic ICH, dysphagia s/p PEG placement presents to [facility] after his PEG tube was dislodged. He was found to be septic, with unclear source, though CT chest reveals septic emboli, and BCx reveal MRSA. Neurological exam reveals decreased blink to threat on the R. CT head shows bilateral subdural collections, and R STACIE infarct.    Impression: Etiology of STACIE infarct is likely cardioembolic in the setting of a-fib, though it is also possible that infarct is 2/2 septic emboli in the setting of possible endocarditis. However, given the absence of hemorrhage, it is less likely that patient has mycotic aneurysms. Given his poor functional status, he would not benefit from further neurovascular interventions.     Plan:  - no neurological contraindication to starting AC for a-fib, can start apixaban  - f/u ID recommendations  - PT/OT/Speech and swallow/safety eval  - no further neurovascular workup required

## 2020-12-18 NOTE — PROGRESS NOTE ADULT - ASSESSMENT
89 year old man history of DM, hard of hearing, traumatic ICH, sacral decubitus ulcer, chronic zamudio, presenting with dislodged PEG, meeting sepsis criteria, BCx positive for MRSA, UCx positive for Ecoli, tissue culture of right eye biopsy positive for MRSA at outside hospital during prior admission.

## 2020-12-19 LAB
ANION GAP SERPL CALC-SCNC: 7 MMOL/L — SIGNIFICANT CHANGE UP (ref 7–14)
BASOPHILS # BLD AUTO: 0.1 K/UL — SIGNIFICANT CHANGE UP (ref 0–0.2)
BASOPHILS NFR BLD AUTO: 0.5 % — SIGNIFICANT CHANGE UP (ref 0–2)
BUN SERPL-MCNC: 18 MG/DL — SIGNIFICANT CHANGE UP (ref 7–23)
CALCIUM SERPL-MCNC: 8.2 MG/DL — LOW (ref 8.4–10.5)
CHLORIDE SERPL-SCNC: 106 MMOL/L — SIGNIFICANT CHANGE UP (ref 98–107)
CO2 SERPL-SCNC: 29 MMOL/L — SIGNIFICANT CHANGE UP (ref 22–31)
CREAT SERPL-MCNC: 0.67 MG/DL — SIGNIFICANT CHANGE UP (ref 0.5–1.3)
EOSINOPHIL # BLD AUTO: 0.84 K/UL — HIGH (ref 0–0.5)
EOSINOPHIL NFR BLD AUTO: 4 % — SIGNIFICANT CHANGE UP (ref 0–6)
GLUCOSE SERPL-MCNC: 152 MG/DL — HIGH (ref 70–99)
HCT VFR BLD CALC: 25.4 % — LOW (ref 39–50)
HGB BLD-MCNC: 7.7 G/DL — LOW (ref 13–17)
IANC: 16.56 K/UL — HIGH (ref 1.5–8.5)
IMM GRANULOCYTES NFR BLD AUTO: 1.9 % — HIGH (ref 0–1.5)
LYMPHOCYTES # BLD AUTO: 1.99 K/UL — SIGNIFICANT CHANGE UP (ref 1–3.3)
LYMPHOCYTES # BLD AUTO: 9.5 % — LOW (ref 13–44)
MAGNESIUM SERPL-MCNC: 2 MG/DL — SIGNIFICANT CHANGE UP (ref 1.6–2.6)
MCHC RBC-ENTMCNC: 30.3 GM/DL — LOW (ref 32–36)
MCHC RBC-ENTMCNC: 31.2 PG — SIGNIFICANT CHANGE UP (ref 27–34)
MCV RBC AUTO: 102.8 FL — HIGH (ref 80–100)
MONOCYTES # BLD AUTO: 1.15 K/UL — HIGH (ref 0–0.9)
MONOCYTES NFR BLD AUTO: 5.5 % — SIGNIFICANT CHANGE UP (ref 2–14)
NEUTROPHILS # BLD AUTO: 16.56 K/UL — HIGH (ref 1.8–7.4)
NEUTROPHILS NFR BLD AUTO: 78.6 % — HIGH (ref 43–77)
NRBC # BLD: 0 /100 WBCS — SIGNIFICANT CHANGE UP
NRBC # FLD: 0 K/UL — SIGNIFICANT CHANGE UP
PHOSPHATE SERPL-MCNC: 2.4 MG/DL — LOW (ref 2.5–4.5)
PLATELET # BLD AUTO: 228 K/UL — SIGNIFICANT CHANGE UP (ref 150–400)
POTASSIUM SERPL-MCNC: 3.9 MMOL/L — SIGNIFICANT CHANGE UP (ref 3.5–5.3)
POTASSIUM SERPL-SCNC: 3.9 MMOL/L — SIGNIFICANT CHANGE UP (ref 3.5–5.3)
RBC # BLD: 2.47 M/UL — LOW (ref 4.2–5.8)
RBC # FLD: 16 % — HIGH (ref 10.3–14.5)
SODIUM SERPL-SCNC: 142 MMOL/L — SIGNIFICANT CHANGE UP (ref 135–145)
WBC # BLD: 21.05 K/UL — HIGH (ref 3.8–10.5)
WBC # FLD AUTO: 21.05 K/UL — HIGH (ref 3.8–10.5)

## 2020-12-19 PROCEDURE — 99233 SBSQ HOSP IP/OBS HIGH 50: CPT

## 2020-12-19 RX ORDER — POTASSIUM PHOSPHATE, MONOBASIC POTASSIUM PHOSPHATE, DIBASIC 236; 224 MG/ML; MG/ML
15 INJECTION, SOLUTION INTRAVENOUS ONCE
Refills: 0 | Status: COMPLETED | OUTPATIENT
Start: 2020-12-19 | End: 2020-12-19

## 2020-12-19 RX ADMIN — INSULIN GLARGINE 12 UNIT(S): 100 INJECTION, SOLUTION SUBCUTANEOUS at 22:40

## 2020-12-19 RX ADMIN — Medication 1: at 23:43

## 2020-12-19 RX ADMIN — Medication 100 MILLIGRAM(S): at 12:58

## 2020-12-19 RX ADMIN — Medication 2: at 06:54

## 2020-12-19 RX ADMIN — Medication 2: at 00:54

## 2020-12-19 RX ADMIN — Medication 250 MILLIGRAM(S): at 12:59

## 2020-12-19 RX ADMIN — LEVETIRACETAM 500 MILLIGRAM(S): 250 TABLET, FILM COATED ORAL at 06:49

## 2020-12-19 RX ADMIN — SODIUM CHLORIDE 3 MILLILITER(S): 9 INJECTION INTRAMUSCULAR; INTRAVENOUS; SUBCUTANEOUS at 13:01

## 2020-12-19 RX ADMIN — ENOXAPARIN SODIUM 30 MILLIGRAM(S): 100 INJECTION SUBCUTANEOUS at 12:57

## 2020-12-19 RX ADMIN — SODIUM CHLORIDE 3 MILLILITER(S): 9 INJECTION INTRAMUSCULAR; INTRAVENOUS; SUBCUTANEOUS at 06:48

## 2020-12-19 RX ADMIN — Medication 1: at 12:56

## 2020-12-19 RX ADMIN — Medication 250 MILLIGRAM(S): at 00:15

## 2020-12-19 RX ADMIN — POTASSIUM PHOSPHATE, MONOBASIC POTASSIUM PHOSPHATE, DIBASIC 62.5 MILLIMOLE(S): 236; 224 INJECTION, SOLUTION INTRAVENOUS at 14:33

## 2020-12-19 RX ADMIN — Medication 2: at 17:53

## 2020-12-19 RX ADMIN — SODIUM CHLORIDE 3 MILLILITER(S): 9 INJECTION INTRAMUSCULAR; INTRAVENOUS; SUBCUTANEOUS at 21:14

## 2020-12-19 RX ADMIN — CHLORHEXIDINE GLUCONATE 1 APPLICATION(S): 213 SOLUTION TOPICAL at 12:55

## 2020-12-19 RX ADMIN — Medication 3 MILLIGRAM(S): at 22:40

## 2020-12-19 RX ADMIN — LEVETIRACETAM 500 MILLIGRAM(S): 250 TABLET, FILM COATED ORAL at 17:54

## 2020-12-19 NOTE — PROGRESS NOTE ADULT - SUBJECTIVE AND OBJECTIVE BOX
Darnell Elkins MD PGY1   Team 7  Contact/Pager - [phone]      Patient is a 89y old  Male who presents with a chief complaint of PEG tube dislodgement (18 Dec 2020 19:06)        SUBJECTIVE / OVERNIGHT EVENTS: No events overnight. The patient was easily arousable to voice this am. He stated his name, he knew he was in the hospital, he states the year as 2020. He followed commands. The patient asks for coffee. He denies any pain or discomfort. The patient is much more alert this morning than previous exams.      MEDICATIONS  (STANDING):  chlorhexidine 4% Liquid 1 Application(s) Topical daily  dextrose 40% Gel 15 Gram(s) Oral once  dextrose 5%. 1000 milliLiter(s) (50 mL/Hr) IV Continuous <Continuous>  dextrose 5%. 1000 milliLiter(s) (100 mL/Hr) IV Continuous <Continuous>  dextrose 50% Injectable 25 Gram(s) IV Push once  dextrose 50% Injectable 12.5 Gram(s) IV Push once  dextrose 50% Injectable 25 Gram(s) IV Push once  dextrose 50% Injectable 25 Gram(s) IV Push once  enoxaparin Injectable 30 milliGRAM(s) SubCutaneous daily  glucagon  Injectable 1 milliGRAM(s) IntraMuscular once  insulin glargine Injectable (LANTUS) 12 Unit(s) SubCutaneous at bedtime  insulin lispro (ADMELOG) corrective regimen sliding scale   SubCutaneous every 6 hours  levETIRAcetam 500 milliGRAM(s) Oral two times a day  melatonin 3 milliGRAM(s) Oral at bedtime  sodium chloride 0.45%. 1000 milliLiter(s) (75 mL/Hr) IV Continuous <Continuous>  sodium chloride 0.9% lock flush 3 milliLiter(s) IV Push every 8 hours  thiamine 100 milliGRAM(s) Oral daily  vancomycin  IVPB 1000 milliGRAM(s) IV Intermittent every 12 hours    MEDICATIONS  (PRN):  haloperidol    Injectable 0.25 milliGRAM(s) IV Push every 6 hours PRN Agitation      Allergies    No Known Allergies    Intolerances          Vital Signs Last 24 Hrs  T(C): 37 (19 Dec 2020 06:47), Max: 37 (19 Dec 2020 06:47)  T(F): 98.6 (19 Dec 2020 06:47), Max: 98.6 (19 Dec 2020 06:47)  HR: 67 (19 Dec 2020 06:47) (60 - 69)  BP: 137/70 (19 Dec 2020 06:47) (137/70 - 159/55)  BP(mean): --  RR: 18 (19 Dec 2020 06:47) (18 - 18)  SpO2: 98% (19 Dec 2020 06:47) (98% - 99%)  CAPILLARY BLOOD GLUCOSE      POCT Blood Glucose.: 220 mg/dL (19 Dec 2020 06:50)  POCT Blood Glucose.: 216 mg/dL (19 Dec 2020 00:37)  POCT Blood Glucose.: 206 mg/dL (18 Dec 2020 22:24)  POCT Blood Glucose.: 220 mg/dL (18 Dec 2020 18:08)  POCT Blood Glucose.: 234 mg/dL (18 Dec 2020 17:31)  POCT Blood Glucose.: 123 mg/dL (18 Dec 2020 12:46)    I&O's Summary    18 Dec 2020 07:01  -  19 Dec 2020 07:00  --------------------------------------------------------  IN: 450 mL / OUT: 600 mL / NET: -150 mL      PHYSICAL EXAM:  GENERAL: thin frail elderly  HEAD:  AT, NC  EYES: EOMI, PERRLA, conjunctiva and sclera clear  ENMT: Airway patent. dry mucus membranes, poor dentition, macroglossia  NECK: Supple,   CHEST/LUNG: CTABL;  HEART: RRR; Normal S1, S2. No murmurs, rubs, or gallops  ABDOMEN: Soft, NT, ND; Bowel sounds present. No organomegaly, PEG tube in place  GI/-zamudio inplace with yellow urine,   EXTREMITIES:  2+ Peripheral Pulses, No clubbing, cyanosis, or edema  PSYCH: affect is stable and appropriate  NEUROLOGY: no obvious focal motor deficits  SKIN: sacral decubitus        LABS:                        8.0    27.26 )-----------( 257      ( 18 Dec 2020 05:48 )             26.1     12-18    146<H>  |  112<H>  |  23  ----------------------------<  52<LL>  3.5   |  27  |  0.71    Ca    8.6      18 Dec 2020 05:48  Phos  2.8     12-18  Mg     2.0     12-18    TPro  6.1  /  Alb  2.3<L>  /  TBili  0.4  /  DBili  x   /  AST  11  /  ALT  13  /  AlkPhos  95  12-18    LIVER FUNCTIONS - ( 18 Dec 2020 05:48 )  Alb: 2.3 g/dL / Pro: 6.1 g/dL / ALK PHOS: 95 U/L / ALT: 13 U/L / AST: 11 U/L / GGT: x                       Culture - Blood (collected 16 Dec 2020 14:25)  Source: .Blood Blood-Peripheral  Preliminary Report (17 Dec 2020 15:01):    No growth to date.    Culture - Blood (collected 16 Dec 2020 14:25)  Source: .Blood Blood  Preliminary Report (17 Dec 2020 15:01):    No growth to date.          RADIOLOGY & ADDITIONAL TESTS:    Imaging Personally Reviewed: YES    Consultant(s) Notes Reviewed: YES    Care Discussed with Consultants/Other Providers: YES

## 2020-12-19 NOTE — PROGRESS NOTE ADULT - PROBLEM SELECTOR PLAN 6
hx of ICH, hospitalized at [facility] in November 2/2 fall and traumatic ICH  continue Keppra for seizure prophylaxis.  Neuro cleared patient to resume full AC to prevent future thromboembolism in the setting of recent atrial fibrillation  Risks and benefits discussed with family, decision reached to hold anticoagulation as risks likely outweigh benefits given the patient's functional status

## 2020-12-19 NOTE — PROGRESS NOTE ADULT - PROBLEM SELECTOR PLAN 1
Sepsis secondary to unclear source- most likely sacral ulcer vs endocarditis less vs Right Eye lesion, likely UTI, Pneumonia or Midline  Bcx positive for MRSA, Ucx with Ecoli, CT chest with peripheral opacities and cavitation suspicious for septic emboli, Midline culture NG  TTE inconclusive for endocarditis  No need for MAXX as would not   As per optho no evidence of lesion that could be causing sepsis  Continue Vancomycin 1000mg IV Q12

## 2020-12-20 LAB
ANION GAP SERPL CALC-SCNC: 5 MMOL/L — LOW (ref 7–14)
BASOPHILS # BLD AUTO: 0.08 K/UL — SIGNIFICANT CHANGE UP (ref 0–0.2)
BASOPHILS NFR BLD AUTO: 0.5 % — SIGNIFICANT CHANGE UP (ref 0–2)
BUN SERPL-MCNC: 18 MG/DL — SIGNIFICANT CHANGE UP (ref 7–23)
CALCIUM SERPL-MCNC: 8.2 MG/DL — LOW (ref 8.4–10.5)
CHLORIDE SERPL-SCNC: 109 MMOL/L — HIGH (ref 98–107)
CO2 SERPL-SCNC: 29 MMOL/L — SIGNIFICANT CHANGE UP (ref 22–31)
CREAT SERPL-MCNC: 0.72 MG/DL — SIGNIFICANT CHANGE UP (ref 0.5–1.3)
EOSINOPHIL # BLD AUTO: 0.76 K/UL — HIGH (ref 0–0.5)
EOSINOPHIL NFR BLD AUTO: 4.6 % — SIGNIFICANT CHANGE UP (ref 0–6)
GLUCOSE SERPL-MCNC: 92 MG/DL — SIGNIFICANT CHANGE UP (ref 70–99)
HCT VFR BLD CALC: 24 % — LOW (ref 39–50)
HGB BLD-MCNC: 7.5 G/DL — LOW (ref 13–17)
IANC: 12.2 K/UL — HIGH (ref 1.5–8.5)
IMM GRANULOCYTES NFR BLD AUTO: 2 % — HIGH (ref 0–1.5)
LYMPHOCYTES # BLD AUTO: 13.8 % — SIGNIFICANT CHANGE UP (ref 13–44)
LYMPHOCYTES # BLD AUTO: 2.3 K/UL — SIGNIFICANT CHANGE UP (ref 1–3.3)
MAGNESIUM SERPL-MCNC: 2 MG/DL — SIGNIFICANT CHANGE UP (ref 1.6–2.6)
MCHC RBC-ENTMCNC: 31.3 GM/DL — LOW (ref 32–36)
MCHC RBC-ENTMCNC: 31.8 PG — SIGNIFICANT CHANGE UP (ref 27–34)
MCV RBC AUTO: 101.7 FL — HIGH (ref 80–100)
MONOCYTES # BLD AUTO: 0.95 K/UL — HIGH (ref 0–0.9)
MONOCYTES NFR BLD AUTO: 5.7 % — SIGNIFICANT CHANGE UP (ref 2–14)
NEUTROPHILS # BLD AUTO: 12.2 K/UL — HIGH (ref 1.8–7.4)
NEUTROPHILS NFR BLD AUTO: 73.4 % — SIGNIFICANT CHANGE UP (ref 43–77)
NRBC # BLD: 0 /100 WBCS — SIGNIFICANT CHANGE UP
NRBC # FLD: 0 K/UL — SIGNIFICANT CHANGE UP
PHOSPHATE SERPL-MCNC: 3.1 MG/DL — SIGNIFICANT CHANGE UP (ref 2.5–4.5)
PLATELET # BLD AUTO: 225 K/UL — SIGNIFICANT CHANGE UP (ref 150–400)
POTASSIUM SERPL-MCNC: 3.8 MMOL/L — SIGNIFICANT CHANGE UP (ref 3.5–5.3)
POTASSIUM SERPL-SCNC: 3.8 MMOL/L — SIGNIFICANT CHANGE UP (ref 3.5–5.3)
RBC # BLD: 2.36 M/UL — LOW (ref 4.2–5.8)
RBC # FLD: 16.1 % — HIGH (ref 10.3–14.5)
SODIUM SERPL-SCNC: 143 MMOL/L — SIGNIFICANT CHANGE UP (ref 135–145)
VANCOMYCIN TROUGH SERPL-MCNC: 19.6 UG/ML — SIGNIFICANT CHANGE UP (ref 10–20)
WBC # BLD: 16.62 K/UL — HIGH (ref 3.8–10.5)
WBC # FLD AUTO: 16.62 K/UL — HIGH (ref 3.8–10.5)

## 2020-12-20 PROCEDURE — 99233 SBSQ HOSP IP/OBS HIGH 50: CPT

## 2020-12-20 RX ORDER — LEVETIRACETAM 250 MG/1
750 TABLET, FILM COATED ORAL EVERY 12 HOURS
Refills: 0 | Status: DISCONTINUED | OUTPATIENT
Start: 2020-12-21 | End: 2020-12-23

## 2020-12-20 RX ORDER — LEVETIRACETAM 250 MG/1
750 TABLET, FILM COATED ORAL
Refills: 0 | Status: DISCONTINUED | OUTPATIENT
Start: 2020-12-20 | End: 2020-12-20

## 2020-12-20 RX ADMIN — Medication 100 MILLIGRAM(S): at 17:36

## 2020-12-20 RX ADMIN — SODIUM CHLORIDE 3 MILLILITER(S): 9 INJECTION INTRAMUSCULAR; INTRAVENOUS; SUBCUTANEOUS at 05:19

## 2020-12-20 RX ADMIN — ENOXAPARIN SODIUM 30 MILLIGRAM(S): 100 INJECTION SUBCUTANEOUS at 17:36

## 2020-12-20 RX ADMIN — Medication 1: at 13:10

## 2020-12-20 RX ADMIN — SODIUM CHLORIDE 3 MILLILITER(S): 9 INJECTION INTRAMUSCULAR; INTRAVENOUS; SUBCUTANEOUS at 14:27

## 2020-12-20 RX ADMIN — SODIUM CHLORIDE 3 MILLILITER(S): 9 INJECTION INTRAMUSCULAR; INTRAVENOUS; SUBCUTANEOUS at 22:10

## 2020-12-20 RX ADMIN — Medication 250 MILLIGRAM(S): at 19:57

## 2020-12-20 RX ADMIN — INSULIN GLARGINE 12 UNIT(S): 100 INJECTION, SOLUTION SUBCUTANEOUS at 22:59

## 2020-12-20 RX ADMIN — LEVETIRACETAM 500 MILLIGRAM(S): 250 TABLET, FILM COATED ORAL at 05:19

## 2020-12-20 RX ADMIN — CHLORHEXIDINE GLUCONATE 1 APPLICATION(S): 213 SOLUTION TOPICAL at 17:37

## 2020-12-20 RX ADMIN — Medication 250 MILLIGRAM(S): at 07:51

## 2020-12-20 RX ADMIN — LEVETIRACETAM 750 MILLIGRAM(S): 250 TABLET, FILM COATED ORAL at 18:51

## 2020-12-20 NOTE — PROGRESS NOTE ADULT - SUBJECTIVE AND OBJECTIVE BOX
PROGRESS NOTE:   Authored by Alexandra Ponce MD    Patient is a 89y old  Male who presents with a chief complaint of PEG tube dislodgement (19 Dec 2020 11:51)      SUBJECTIVE / OVERNIGHT EVENTS:    ADDITIONAL REVIEW OF SYSTEMS:    MEDICATIONS  (STANDING):  chlorhexidine 4% Liquid 1 Application(s) Topical daily  dextrose 40% Gel 15 Gram(s) Oral once  dextrose 5%. 1000 milliLiter(s) (50 mL/Hr) IV Continuous <Continuous>  dextrose 5%. 1000 milliLiter(s) (100 mL/Hr) IV Continuous <Continuous>  dextrose 50% Injectable 25 Gram(s) IV Push once  dextrose 50% Injectable 25 Gram(s) IV Push once  dextrose 50% Injectable 12.5 Gram(s) IV Push once  dextrose 50% Injectable 25 Gram(s) IV Push once  enoxaparin Injectable 30 milliGRAM(s) SubCutaneous daily  glucagon  Injectable 1 milliGRAM(s) IntraMuscular once  insulin glargine Injectable (LANTUS) 12 Unit(s) SubCutaneous at bedtime  insulin lispro (ADMELOG) corrective regimen sliding scale   SubCutaneous every 6 hours  levETIRAcetam 750 milliGRAM(s) Oral two times a day  melatonin 3 milliGRAM(s) Oral at bedtime  sodium chloride 0.45%. 1000 milliLiter(s) (75 mL/Hr) IV Continuous <Continuous>  sodium chloride 0.9% lock flush 3 milliLiter(s) IV Push every 8 hours  thiamine 100 milliGRAM(s) Oral daily  vancomycin  IVPB 1000 milliGRAM(s) IV Intermittent every 12 hours    MEDICATIONS  (PRN):  haloperidol    Injectable 0.25 milliGRAM(s) IV Push every 6 hours PRN Agitation      CAPILLARY BLOOD GLUCOSE      POCT Blood Glucose.: 107 mg/dL (20 Dec 2020 05:12)  POCT Blood Glucose.: 151 mg/dL (19 Dec 2020 23:40)  POCT Blood Glucose.: 135 mg/dL (19 Dec 2020 22:23)  POCT Blood Glucose.: 212 mg/dL (19 Dec 2020 17:48)  POCT Blood Glucose.: 186 mg/dL (19 Dec 2020 12:37)    I&O's Summary    19 Dec 2020 07:01  -  20 Dec 2020 07:00  --------------------------------------------------------  IN: 1300 mL / OUT: 1400 mL / NET: -100 mL    20 Dec 2020 07:01  -  20 Dec 2020 09:34  --------------------------------------------------------  IN: 0 mL / OUT: 550 mL / NET: -550 mL        PHYSICAL EXAM:  Vital Signs Last 24 Hrs  T(C): 36.3 (20 Dec 2020 04:22), Max: 36.9 (20 Dec 2020 00:22)  T(F): 97.3 (20 Dec 2020 04:22), Max: 98.5 (20 Dec 2020 00:22)  HR: 61 (20 Dec 2020 04:22) (60 - 67)  BP: 134/60 (20 Dec 2020 04:22) (131/66 - 153/55)  BP(mean): --  RR: 18 (20 Dec 2020 04:22) (16 - 18)  SpO2: 98% (20 Dec 2020 04:22) (98% - 100%)    GENERAL: No acute distress, well-developed  HEAD:  Atraumatic, Normocephalic  EYES: EOMI, PERRLA, conjunctiva and sclera clear  NECK: Supple, no lymphadenopathy, no JVD  CHEST/LUNG: CTAB; No wheezes, rales, or rhonchi  HEART: Regular rate and rhythm; No murmurs, rubs, or gallops  ABDOMEN: Soft, non-tender, non-distended; normal bowel sounds, no organomegaly  EXTREMITIES:  2+ peripheral pulses b/l, No clubbing, cyanosis, or edema  NEUROLOGY: A&O x 3, no focal deficits  SKIN: No rashes or lesions    LABS:                        7.5    16.62 )-----------( 225      ( 20 Dec 2020 05:34 )             24.0     12-20    143  |  109<H>  |  18  ----------------------------<  92  3.8   |  29  |  0.72    Ca    8.2<L>      20 Dec 2020 05:34  Phos  3.1     12-20  Mg     2.0     12-20                  RADIOLOGY & ADDITIONAL TESTS:  Results Reviewed:   Imaging Personally Reviewed:  Electrocardiogram Personally Reviewed:    COORDINATION OF CARE:  Care Discussed with Consultants/Other Providers [Y/N]:  Prior or Outpatient Records Reviewed [Y/N]:   PROGRESS NOTE:   Authored by Alexandra Ponce MD    Patient is a 89y old  Male who presents with a chief complaint of PEG tube dislodgement (19 Dec 2020 11:51)      SUBJECTIVE / OVERNIGHT EVENTS:  No acute event overnight. Patient denied any pain and discomforts, no chest pain, SOB, abdominal pain, nausea and vomiting.     ADDITIONAL REVIEW OF SYSTEMS:    MEDICATIONS  (STANDING):  chlorhexidine 4% Liquid 1 Application(s) Topical daily  dextrose 40% Gel 15 Gram(s) Oral once  dextrose 5%. 1000 milliLiter(s) (50 mL/Hr) IV Continuous <Continuous>  dextrose 5%. 1000 milliLiter(s) (100 mL/Hr) IV Continuous <Continuous>  dextrose 50% Injectable 25 Gram(s) IV Push once  dextrose 50% Injectable 25 Gram(s) IV Push once  dextrose 50% Injectable 12.5 Gram(s) IV Push once  dextrose 50% Injectable 25 Gram(s) IV Push once  enoxaparin Injectable 30 milliGRAM(s) SubCutaneous daily  glucagon  Injectable 1 milliGRAM(s) IntraMuscular once  insulin glargine Injectable (LANTUS) 12 Unit(s) SubCutaneous at bedtime  insulin lispro (ADMELOG) corrective regimen sliding scale   SubCutaneous every 6 hours  levETIRAcetam 750 milliGRAM(s) Oral two times a day  melatonin 3 milliGRAM(s) Oral at bedtime  sodium chloride 0.45%. 1000 milliLiter(s) (75 mL/Hr) IV Continuous <Continuous>  sodium chloride 0.9% lock flush 3 milliLiter(s) IV Push every 8 hours  thiamine 100 milliGRAM(s) Oral daily  vancomycin  IVPB 1000 milliGRAM(s) IV Intermittent every 12 hours    MEDICATIONS  (PRN):  haloperidol    Injectable 0.25 milliGRAM(s) IV Push every 6 hours PRN Agitation      CAPILLARY BLOOD GLUCOSE      POCT Blood Glucose.: 107 mg/dL (20 Dec 2020 05:12)  POCT Blood Glucose.: 151 mg/dL (19 Dec 2020 23:40)  POCT Blood Glucose.: 135 mg/dL (19 Dec 2020 22:23)  POCT Blood Glucose.: 212 mg/dL (19 Dec 2020 17:48)  POCT Blood Glucose.: 186 mg/dL (19 Dec 2020 12:37)    I&O's Summary    19 Dec 2020 07:01  -  20 Dec 2020 07:00  --------------------------------------------------------  IN: 1300 mL / OUT: 1400 mL / NET: -100 mL    20 Dec 2020 07:01  -  20 Dec 2020 09:34  --------------------------------------------------------  IN: 0 mL / OUT: 550 mL / NET: -550 mL        PHYSICAL EXAM:  Vital Signs Last 24 Hrs  T(C): 36.3 (20 Dec 2020 04:22), Max: 36.9 (20 Dec 2020 00:22)  T(F): 97.3 (20 Dec 2020 04:22), Max: 98.5 (20 Dec 2020 00:22)  HR: 61 (20 Dec 2020 04:22) (60 - 67)  BP: 134/60 (20 Dec 2020 04:22) (131/66 - 153/55)  BP(mean): --  RR: 18 (20 Dec 2020 04:22) (16 - 18)  SpO2: 98% (20 Dec 2020 04:22) (98% - 100%)    GENERAL: No acute distress, well-developed  HEAD:  Atraumatic, Normocephalic  EYES: EOMI, PERRLA, conjunctiva and sclera clear  NECK: Supple, no lymphadenopathy, no JVD  CHEST/LUNG: CTAB; No wheezes, rales, or rhonchi  HEART: Regular rate and rhythm; No murmurs, rubs, or gallops  ABDOMEN: Soft, non-tender, non-distended; normal bowel sounds, no organomegaly  EXTREMITIES:  2+ peripheral pulses b/l, No clubbing, cyanosis, or edema  NEUROLOGY: A&O x 3, no focal deficits  SKIN: No rashes or lesions    LABS:                        7.5    16.62 )-----------( 225      ( 20 Dec 2020 05:34 )             24.0     12-20    143  |  109<H>  |  18  ----------------------------<  92  3.8   |  29  |  0.72    Ca    8.2<L>      20 Dec 2020 05:34  Phos  3.1     12-20  Mg     2.0     12-20                  RADIOLOGY & ADDITIONAL TESTS:  Results Reviewed:   Imaging Personally Reviewed:  Electrocardiogram Personally Reviewed:    COORDINATION OF CARE:  Care Discussed with Consultants/Other Providers [Y/N]:  Prior or Outpatient Records Reviewed [Y/N]:

## 2020-12-20 NOTE — SWALLOW BEDSIDE ASSESSMENT ADULT - COMMENTS
Per charting, 89 y/o white male, bedbound, resident at  [facility] with MHx significant for HTN, DM, [place], h/o chronic ETOH abuse, ICH s/p fall (recently treated at [facility] on 11/3/20 for ICH s/p fall & DKA) started on Keppra for seizure ppx and dysphagia s/p PEG placement and chronic zamudio. While at [facility] pt's PEG tube was dislodged s/p PEG replacement at [facility] transferred to [facility] to verify PEG tube placement. Patient is awake however with limited cognition, able to provide only simplified ROS, voices no complaints.     Patient was initially received in somnolent state, though increasingly arousable upon verbal and tactile stimuli from clinician. Patient inconsistently followed low level directives which may have been further impacted by severely [place] status. Unable to elicit vocalization / speech output despite maximal cueing.

## 2020-12-20 NOTE — SWALLOW BEDSIDE ASSESSMENT ADULT - ADDITIONAL RECOMMENDATIONS
Patient would likely benefit from speech/swallow therapy services following discharge to appropriate setting (i.e. rehab vs home care vs outpatient services). Should patient return to [place], defer to facility speech pathology dept.

## 2020-12-20 NOTE — PROGRESS NOTE ADULT - ATTENDING COMMENTS
MRSA w/ septic emboli on Vancomycin- for 6 week course.   No MAXX as wouldn't  (too high risk)   Keppra 750mg BID  Ongoing GOC  Guarded prognosis

## 2020-12-20 NOTE — PROGRESS NOTE ADULT - ASSESSMENT
89 year old man history of DM, hard of hearing, traumatic ICH, sacral decubitus ulcer, chronic zamudio, presenting with dislodged PEG, meeting sepsis criteria, BCx positive for MRSA, UCx positive for Ecoli, tissue culture of right eye biopsy positive for MRSA at outside hospital during prior admission.     Problem/Plan - 1:  ·  Problem: Sepsis.  Plan: Sepsis secondary to unclear source- most likely sacral ulcer vs endocarditis less vs Right Eye lesion, likely UTI, Pneumonia or Midline  Bcx positive for MRSA, Ucx with Ecoli, CT chest with peripheral opacities and cavitation suspicious for septic emboli, Midline culture NG  TTE inconclusive for endocarditis  No need for MAXX as would not   As per optho no evidence of lesion that could be causing sepsis  Continue Vancomycin 1000mg IV Q12.      Problem/Plan - 2:  ·  Problem: Anemia.  Plan: Patient with drop in Hg from 9.2-->7.7  Repeat CBC shows Hg stable at 8.  Iron studies consistent with anemia of chronic disease  B12 and folate WNL  RADHA without evidence of GI bleeding  Type and Screen obtained, Will trend daily CBC.      Problem/Plan - 3:  ·  Problem: Decubitus ulcer of sacral area.  Plan: Sacral ulcer without purulence or erythema  f/u wound care recommendations.      Problem/Plan - 4:  ·  Problem: Hypernatremia.  Plan: Free water deficit of 0.5 L  PEG tube feeds as per nutrition recs  200ml of free water Q6  Trend BMP.      Problem/Plan - 5:  ·  Problem: DM2 (diabetes mellitus, type 2).  Plan: Labile blood glucose  Lantus 12U QHS  ISS TID.      Problem/Plan - 6:  Problem: Intracerebral hemorrhage. Plan: hx of ICH, hospitalized at [facility] in November 2/2 fall and traumatic ICH  continue Keppra for seizure prophylaxis.  Neuro cleared patient to resume full AC to prevent future thromboembolism in the setting of recent atrial fibrillation  Risks and benefits discussed with family, decision reached to hold anticoagulation as risks likely outweigh benefits given the patient's functional status.     Problem/Plan - 7:  ·  Problem: Discharge planning issues.  Plan: Lovenox 30 for DVT prophylaxis,  Speech and swallow consult to evaluate for handfeeding/pleasure feeding  MOLST with DNR/DNI orders placed in chart after conversation with patient's brother Azalea

## 2020-12-20 NOTE — SWALLOW BEDSIDE ASSESSMENT ADULT - SWALLOW EVAL: DIAGNOSIS
Patient demonstrated oropharyngeal dysphagia for PO trials of puree and honey-thick liquids characterized by adequate labial stripping of bolus from utensil, delayed bolus collection, slow/weak lingual motion with prolonged anterior-posterior transfer, suspected delay in pharyngeal triggering, reduced laryngeal elevation upon digital palpation and delayed wet coughing post PO trials suggestive of laryngeal penetration versus aspiration.

## 2020-12-21 LAB
ANION GAP SERPL CALC-SCNC: 7 MMOL/L — SIGNIFICANT CHANGE UP (ref 7–14)
BASOPHILS # BLD AUTO: 0.11 K/UL — SIGNIFICANT CHANGE UP (ref 0–0.2)
BASOPHILS NFR BLD AUTO: 0.7 % — SIGNIFICANT CHANGE UP (ref 0–2)
BUN SERPL-MCNC: 16 MG/DL — SIGNIFICANT CHANGE UP (ref 7–23)
CALCIUM SERPL-MCNC: 8.1 MG/DL — LOW (ref 8.4–10.5)
CHLORIDE SERPL-SCNC: 109 MMOL/L — HIGH (ref 98–107)
CO2 SERPL-SCNC: 27 MMOL/L — SIGNIFICANT CHANGE UP (ref 22–31)
CREAT SERPL-MCNC: 0.72 MG/DL — SIGNIFICANT CHANGE UP (ref 0.5–1.3)
CULTURE RESULTS: SIGNIFICANT CHANGE UP
CULTURE RESULTS: SIGNIFICANT CHANGE UP
EOSINOPHIL # BLD AUTO: 0.66 K/UL — HIGH (ref 0–0.5)
EOSINOPHIL NFR BLD AUTO: 4.2 % — SIGNIFICANT CHANGE UP (ref 0–6)
GLUCOSE SERPL-MCNC: 84 MG/DL — SIGNIFICANT CHANGE UP (ref 70–99)
HCT VFR BLD CALC: 25.9 % — LOW (ref 39–50)
HGB BLD-MCNC: 7.8 G/DL — LOW (ref 13–17)
IANC: 11.49 K/UL — HIGH (ref 1.5–8.5)
IMM GRANULOCYTES NFR BLD AUTO: 2.4 % — HIGH (ref 0–1.5)
LYMPHOCYTES # BLD AUTO: 1.98 K/UL — SIGNIFICANT CHANGE UP (ref 1–3.3)
LYMPHOCYTES # BLD AUTO: 12.7 % — LOW (ref 13–44)
MAGNESIUM SERPL-MCNC: 2 MG/DL — SIGNIFICANT CHANGE UP (ref 1.6–2.6)
MCHC RBC-ENTMCNC: 30.1 GM/DL — LOW (ref 32–36)
MCHC RBC-ENTMCNC: 30.7 PG — SIGNIFICANT CHANGE UP (ref 27–34)
MCV RBC AUTO: 102 FL — HIGH (ref 80–100)
MONOCYTES # BLD AUTO: 1.01 K/UL — HIGH (ref 0–0.9)
MONOCYTES NFR BLD AUTO: 6.5 % — SIGNIFICANT CHANGE UP (ref 2–14)
NEUTROPHILS # BLD AUTO: 11.49 K/UL — HIGH (ref 1.8–7.4)
NEUTROPHILS NFR BLD AUTO: 73.5 % — SIGNIFICANT CHANGE UP (ref 43–77)
NRBC # BLD: 0 /100 WBCS — SIGNIFICANT CHANGE UP
NRBC # FLD: 0 K/UL — SIGNIFICANT CHANGE UP
PHOSPHATE SERPL-MCNC: 2.7 MG/DL — SIGNIFICANT CHANGE UP (ref 2.5–4.5)
PLATELET # BLD AUTO: 246 K/UL — SIGNIFICANT CHANGE UP (ref 150–400)
POTASSIUM SERPL-MCNC: 3.9 MMOL/L — SIGNIFICANT CHANGE UP (ref 3.5–5.3)
POTASSIUM SERPL-SCNC: 3.9 MMOL/L — SIGNIFICANT CHANGE UP (ref 3.5–5.3)
RBC # BLD: 2.54 M/UL — LOW (ref 4.2–5.8)
RBC # FLD: 16 % — HIGH (ref 10.3–14.5)
SODIUM SERPL-SCNC: 143 MMOL/L — SIGNIFICANT CHANGE UP (ref 135–145)
SPECIMEN SOURCE: SIGNIFICANT CHANGE UP
SPECIMEN SOURCE: SIGNIFICANT CHANGE UP
WBC # BLD: 15.62 K/UL — HIGH (ref 3.8–10.5)
WBC # FLD AUTO: 15.62 K/UL — HIGH (ref 3.8–10.5)

## 2020-12-21 PROCEDURE — 99233 SBSQ HOSP IP/OBS HIGH 50: CPT | Mod: GC

## 2020-12-21 PROCEDURE — 99232 SBSQ HOSP IP/OBS MODERATE 35: CPT

## 2020-12-21 PROCEDURE — 74018 RADEX ABDOMEN 1 VIEW: CPT | Mod: 26

## 2020-12-21 RX ORDER — IOHEXOL 300 MG/ML
30 INJECTION, SOLUTION INTRAVENOUS ONCE
Refills: 0 | Status: DISCONTINUED | OUTPATIENT
Start: 2020-12-21 | End: 2020-12-21

## 2020-12-21 RX ORDER — DIATRIZOATE MEGLUMINE 180 MG/ML
30 INJECTION, SOLUTION INTRAVESICAL ONCE
Refills: 0 | Status: DISCONTINUED | OUTPATIENT
Start: 2020-12-21 | End: 2020-12-23

## 2020-12-21 RX ADMIN — Medication 250 MILLIGRAM(S): at 19:00

## 2020-12-21 RX ADMIN — SODIUM CHLORIDE 3 MILLILITER(S): 9 INJECTION INTRAMUSCULAR; INTRAVENOUS; SUBCUTANEOUS at 13:34

## 2020-12-21 RX ADMIN — Medication 0: at 22:47

## 2020-12-21 RX ADMIN — INSULIN GLARGINE 12 UNIT(S): 100 INJECTION, SOLUTION SUBCUTANEOUS at 22:45

## 2020-12-21 RX ADMIN — Medication 3 MILLIGRAM(S): at 22:57

## 2020-12-21 RX ADMIN — Medication 250 MILLIGRAM(S): at 07:37

## 2020-12-21 RX ADMIN — LEVETIRACETAM 400 MILLIGRAM(S): 250 TABLET, FILM COATED ORAL at 19:00

## 2020-12-21 RX ADMIN — SODIUM CHLORIDE 3 MILLILITER(S): 9 INJECTION INTRAMUSCULAR; INTRAVENOUS; SUBCUTANEOUS at 22:46

## 2020-12-21 RX ADMIN — Medication 1: at 00:05

## 2020-12-21 RX ADMIN — CHLORHEXIDINE GLUCONATE 1 APPLICATION(S): 213 SOLUTION TOPICAL at 14:27

## 2020-12-21 RX ADMIN — LEVETIRACETAM 400 MILLIGRAM(S): 250 TABLET, FILM COATED ORAL at 05:23

## 2020-12-21 RX ADMIN — ENOXAPARIN SODIUM 30 MILLIGRAM(S): 100 INJECTION SUBCUTANEOUS at 14:28

## 2020-12-21 RX ADMIN — SODIUM CHLORIDE 3 MILLILITER(S): 9 INJECTION INTRAMUSCULAR; INTRAVENOUS; SUBCUTANEOUS at 05:03

## 2020-12-21 NOTE — PROGRESS NOTE ADULT - SUBJECTIVE AND OBJECTIVE BOX
HERVE NGUYEN 89y [id number]    Patient is a 89y old  Male who presents with a chief complaint of PEG tube dislodgement (21 Dec 2020 13:12)      Follow Up/CC:  ID following for bacteremia    Interval History/ROS: no fever    Allergies    No Known Allergies    Intolerances        ANTIMICROBIALS:  vancomycin  IVPB 1000 every 12 hours      MEDICATIONS  (STANDING):  chlorhexidine 4% Liquid 1 Application(s) Topical daily  dextrose 40% Gel 15 Gram(s) Oral once  dextrose 5%. 1000 milliLiter(s) (50 mL/Hr) IV Continuous <Continuous>  dextrose 5%. 1000 milliLiter(s) (100 mL/Hr) IV Continuous <Continuous>  dextrose 50% Injectable 25 Gram(s) IV Push once  dextrose 50% Injectable 25 Gram(s) IV Push once  dextrose 50% Injectable 12.5 Gram(s) IV Push once  dextrose 50% Injectable 25 Gram(s) IV Push once  diatrizoate meglumine/diatrizoate sodium. 30 milliLiter(s) Oral once  enoxaparin Injectable 30 milliGRAM(s) SubCutaneous daily  glucagon  Injectable 1 milliGRAM(s) IntraMuscular once  insulin glargine Injectable (LANTUS) 12 Unit(s) SubCutaneous at bedtime  insulin lispro (ADMELOG) corrective regimen sliding scale   SubCutaneous every 6 hours  levETIRAcetam  IVPB 750 milliGRAM(s) IV Intermittent every 12 hours  melatonin 3 milliGRAM(s) Oral at bedtime  sodium chloride 0.45%. 1000 milliLiter(s) (75 mL/Hr) IV Continuous <Continuous>  sodium chloride 0.9% lock flush 3 milliLiter(s) IV Push every 8 hours  thiamine 100 milliGRAM(s) Oral daily  vancomycin  IVPB 1000 milliGRAM(s) IV Intermittent every 12 hours    MEDICATIONS  (PRN):  haloperidol    Injectable 0.25 milliGRAM(s) IV Push every 6 hours PRN Agitation        Vital Signs Last 24 Hrs  T(C): 37 (21 Dec 2020 14:29), Max: 37.1 (20 Dec 2020 21:08)  T(F): 98.6 (21 Dec 2020 14:29), Max: 98.8 (20 Dec 2020 21:08)  HR: 57 (21 Dec 2020 14:29) (57 - 65)  BP: 151/53 (21 Dec 2020 14:29) (144/61 - 151/60)  BP(mean): --  RR: 18 (21 Dec 2020 14:29) (16 - 18)  SpO2: 100% (21 Dec 2020 14:29) (97% - 100%)    CBC Full  -  ( 21 Dec 2020 08:47 )  WBC Count : 15.62 K/uL  RBC Count : 2.54 M/uL  Hemoglobin : 7.8 g/dL  Hematocrit : 25.9 %  Platelet Count - Automated : 246 K/uL  Mean Cell Volume : 102.0 fL  Mean Cell Hemoglobin : 30.7 pg  Mean Cell Hemoglobin Concentration : 30.1 gm/dL  Auto Neutrophil # : 11.49 K/uL  Auto Lymphocyte # : 1.98 K/uL  Auto Monocyte # : 1.01 K/uL  Auto Eosinophil # : 0.66 K/uL  Auto Basophil # : 0.11 K/uL  Auto Neutrophil % : 73.5 %  Auto Lymphocyte % : 12.7 %  Auto Monocyte % : 6.5 %  Auto Eosinophil % : 4.2 %  Auto Basophil % : 0.7 %    12-21    143  |  109<H>  |  16  ----------------------------<  84  3.9   |  27  |  0.72    Ca    8.1<L>      21 Dec 2020 08:47  Phos  2.7     12-21  Mg     2.0     12-21            MICROBIOLOGY:  .Blood Blood-Peripheral  12-16-20   No Growth Final  --  --      .Blood Blood  12-16-20   No Growth Final  --  --      .Blood Blood-Venous  12-15-20   Growth in aerobic and anaerobic bottles: Methicillin resistant  Staphylococcus aureus  See previous culture [id number]  --    Growth in anaerobic bottle: Gram Positive Cocci in Clusters  Growth in aerobic bottle: Gram Positive Cocci in Clusters      .Catheter PICC Tip  12-14-20   No growth  --  --      .Urine Catheterized  12-13-20   >100,000 CFU/ml Escherichia coli  --  Escherichia coli      .Blood Blood  12-13-20   Growth in aerobic and anaerobic bottles: Methicillin resistant  Staphylococcus aureus  "Due to technical problems, Proteus sp. and Pseudomonas aeruginosa will  Not be reported as part of the BCID panel  ***Blood Panel PCR results on this specimen are available  approximately 3 hours after the Gram stain result.***  Gram stain, PCR, and/or culture results may not always  correspond due to difference in methodologies.  ************************************************************  This PCR assay was performed using [employer].  The following targets are tested for: Enterococcus,  vancomycin resistant enterococci, Listeria monocytogenes,  coagulase negative staphylococci, S. aureus,  methicillin resistant S. aureus, Streptococcus agalactiae  (Group B), S. pneumoniae, S. pyogenes (Group A),  Acinetobacter baumannii, Enterobacter cloacae, E. coli,  Klebsiella oxytoca, K. pneumoniae, Proteus sp.,  Serratia marcescens, Haemophilus influenzae,  Neisseria meningitidis, Pseudomonas aeruginosa, Candida  albicans, C. glabrata, C krusei, C parapsilosis,  C. tropicalis and the KPC resistance gene.  --  Blood Culture PCR  Methicillin resistant Staphylococcus aureus      .Blood Blood  12-13-20   Growth in aerobic and anaerobic bottles: Methicillin resistant  Staphylococcus aureus See previous culture [id number]  --    Growth in aerobic bottle: Gram Positive Cocci in Clusters  Growth in anaerobic bottle: Gram Positive Cocci in Clusters    < from: Transthoracic Echocardiogram (12.16.20 @ 19:09) >  1. Mitral annular calcification, otherwise normal mitral  valve. Minimal mitral regurgitation.  2. Endocardium not well visualized; grossly normal left  ventricular systolic function.  3. The right ventricle is not well visualized; grossly  normal right ventricular systolic function.  Unable to exclude endocarditis.  Consider MAXX for further  evaluation, if clinically indicated.    < end of copied text >      RADIOLOGY    < from: Xray Chest 1 View- PORTABLE-Urgent (Xray Chest 1 View- PORTABLE-Urgent .) (12.18.20 @ 10:01) >  The heart is normal in size.    Nodular opacities noted at the right upper and left lower lobe. These are better visualized on CT scan from 12/16/2020. Trace left pleural effusion. No pneumothorax.    No acute osseous abnormalities.    < end of copied text >

## 2020-12-21 NOTE — PROGRESS NOTE ADULT - ATTENDING COMMENTS
Amandeep Briceño  Attending Physician   Division of Infectious Disease  Pager #[phone]  After 5pm/weekend or no response, call #[phone]

## 2020-12-21 NOTE — PROGRESS NOTE ADULT - PROBLEM SELECTOR PLAN 1
MRSA sepsis, unclear etiology. WBC and delirium are improving. No further workup as would not .  Continue Vancomycin 1000mg IV Q12, Vanc trough 19.6

## 2020-12-21 NOTE — PROGRESS NOTE ADULT - ATTENDING COMMENTS
89 yo M w/ hx DM, [place], ICH s/p fall and DKA, Decubitus ulcer, chronic zamudio p/w dislodged PEG. PEG replaced and confirmed. On routine labs was found to have a  elevated WBC found to have LL PNA on Xray. Bcx- Gram positive cocci clusters PCR + MRSA Ucx-Ecoli. CT chest with nodular opacity possible cavitation concern for septic emboli. ON noted to have copious secretions requiring suctioning.   # Sepsis secondary to UTI/ PNA/Pheripheral IV/midline (removed)  -12/13 and 12/15 Bcx +MRSA/ IV line Cx- NGTD   - s/p zosyn for UTI  -repeat Bcx- 12/16-NGTD  - TTE eval for endocarditis neg. ID recommend MAXX. pt not a candidate for any surgical procedure. MAXX unlikely to  will treat with 6 weeks of antibiotics.  - vanco trough 14.8 increase vanco 1 g q12. repeat vanco trough-19  - review of hospitalization records indicate elevated WBC in 30K initial suspicion for meningitis stopped after LP. fluctuant eye lesion biopsied and Cx +MRSA. not dc'ed on antibiotics.   - optho c/s   - given neg BCx IR consult for PICC for longterm Abx   # DM- FS q6 ISS A1c 8.8 hypoglycemia ON will decrease lantus to 12 U. currently on D5. endo recs  # Hypernatremia- Improved cont free water when PEG confirmed w/o leak.   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 cont keppra. CT head repeat records reviewed from [place] was admitted with suspected cardioembolic CVA also was noted to have ICH and also noted to have brief episode of afib not started on AC due to ICH. repeat CT head 12/17 Age indeterminate right STACIE distribution frontal lobe infarct. No significant intracranial hemorrhage identified. Bifrontal low-density extra-axial fluid which may represent prominent subarachnoid spaces, subdural hygromas or chronic subdural hematomas.   neuro consult in regards to AC no contraindication to AC.   # PAF- CHADSVASc score 6. currently rates controlled.  repeat EKG SR. family electing not to AC.  # Decubitus Ulcer-sacral stage 3 wound care as per recs  # protein calorie malnutrition -PEG tube in place and confirmed start Glucerna 35 ml/hr as per nutrition.  Nursing with concern for dislodged PEG. hold PEG feeds check AXR w/ gastrograffin confirm PEG placement.   # Anemia- AOCD- fe studies reviewed retic count low likely in setting of infection. rectal exam consistent with brown stool keep H/H>7/21  # GOC- DNR/DNI

## 2020-12-21 NOTE — PROGRESS NOTE ADULT - ASSESSMENT
89 year old man history of DM, hard of hearing, traumatic ICH, sacral decubitus ulcer, chronic zamudio, presenting with dislodged PEG, meeting sepsis criteria, BCx positive for MRSA, UCx positive for Ecoli, tissue culture of right eye biopsy positive for MRSA at outside hospital during prior admission.     Problem/Plan - 1:  ·  Problem: Sepsis.  Plan: Sepsis secondary to unclear source- most likely sacral ulcer vs endocarditis less vs Right Eye lesion, likely UTI, Pneumonia or Midline  Bcx positive for MRSA, Ucx with Ecoli, CT chest with peripheral opacities and cavitation suspicious for septic emboli, Midline culture NG  TTE inconclusive for endocarditis  No need for MAXX as would not   As per optho no evidence of lesion that could be causing sepsis  Continue Vancomycin 1000mg IV Q12.      Problem/Plan - 2:  ·  Problem: Anemia.  Plan: Patient with drop in Hg from 9.2-->7.7  Repeat CBC shows Hg stable at 8.  Iron studies consistent with anemia of chronic disease  B12 and folate WNL  RADHA without evidence of GI bleeding  Type and Screen obtained, Will trend daily CBC.      Problem/Plan - 3:  ·  Problem: Decubitus ulcer of sacral area.  Plan: Sacral ulcer without purulence or erythema  f/u wound care recommendations.      Problem/Plan - 4:  ·  Problem: Hypernatremia.  Plan: Free water deficit of 0.5 L  PEG tube feeds as per nutrition recs  200ml of free water Q6  Trend BMP.      Problem/Plan - 5:  ·  Problem: DM2 (diabetes mellitus, type 2).  Plan: Labile blood glucose  Lantus 12U QHS  ISS TID.      Problem/Plan - 6:  Problem: Intracerebral hemorrhage. Plan: hx of ICH, hospitalized at [facility] in November 2/2 fall and traumatic ICH  continue Keppra for seizure prophylaxis.  Neuro cleared patient to resume full AC to prevent future thromboembolism in the setting of recent atrial fibrillation  Risks and benefits discussed with family, decision reached to hold anticoagulation as risks likely outweigh benefits given the patient's functional status.     Problem/Plan - 7:  ·  Problem: Discharge planning issues.  Plan: Lovenox 30 for DVT prophylaxis,  Speech and swallow consult to evaluate for handfeeding/pleasure feeding  MOLST with DNR/DNI orders placed in chart after conversation with patient's brother Azalea  89 year old man history of DM, hard of hearing, traumatic ICH, sacral decubitus ulcer, chronic zamudio, presenting with dislodged PEG, meeting sepsis criteria, BCx positive for MRSA, UCx positive for Ecoli, tissue culture of right eye biopsy positive for MRSA at outside hospital during prior admission.

## 2020-12-21 NOTE — PROGRESS NOTE ADULT - PROBLEM SELECTOR PLAN 6
hx of ICH, hospitalized at [facility] in November 2/2 fall and traumatic ICH  continue Keppra for seizure prophylaxis.  Neuro cleared patient to resume full AC to prevent future thromboembolism in the setting of recent atrial fibrillation   Risks and benefits discussion to be revisited with the patient's family regarding starting AC.

## 2020-12-21 NOTE — PROGRESS NOTE ADULT - ASSESSMENT
89M, bedbound resident at  [facility] PMH of HTN, DM, h/o chronic ETOH abuse, ICH s/p fall (recently treated at [facility] on 11/3/20 for ICH s/p fall & DKA) started on Keppra for seizure ppx, dysphagia s/p PEG placement and chronic zamudio admitted for sepsis. Of note pt has a PICC line for unclear reasons. Pt found to be bacteremic, blood culture growing MRSA.    #MRSA bacteremia  - blood cultures growing MRSA x2 bottles 12/13, source likely PICC line  - Stage 3 pressure ulcer does not appear acutely infected  - CT chest showing several peripheral nodular opacities some with lucency/cavitations, suspicious for septic emboli    Plan:  - continue vanc 1000mg q12h  - repeat blood cultures negative so far  - f/u vanc trough levels - keep 15-20  - TTE noted  - pt too high risk for any cardiac procedure  - likely will treat for 6 weeks with iv abx given likely septic emboli in lungs  - CT chest with opacities suspicious for septic emboli  - appreciate wound care recs  - consider palliative eval   - day 6/42 of abx

## 2020-12-21 NOTE — PROGRESS NOTE ADULT - SUBJECTIVE AND OBJECTIVE BOX
Darnell Elkins MD PGY1   Team 7  Contact/Pager - [phone]      Patient is a 89y old  Male who presents with a chief complaint of PEG tube dislodgement (21 Dec 2020 13:12)        SUBJECTIVE / OVERNIGHT EVENTS: concern for dislodged peg overnight, so tube feeds were stopped. The patient is awake and alert this morning. he greeted the writer and asks "how are you doing?". He knows the year, the hospital. he follows commands. He denies any complaints.      MEDICATIONS  (STANDING):  chlorhexidine 4% Liquid 1 Application(s) Topical daily  dextrose 40% Gel 15 Gram(s) Oral once  dextrose 5%. 1000 milliLiter(s) (50 mL/Hr) IV Continuous <Continuous>  dextrose 5%. 1000 milliLiter(s) (100 mL/Hr) IV Continuous <Continuous>  dextrose 50% Injectable 25 Gram(s) IV Push once  dextrose 50% Injectable 25 Gram(s) IV Push once  dextrose 50% Injectable 12.5 Gram(s) IV Push once  dextrose 50% Injectable 25 Gram(s) IV Push once  diatrizoate meglumine/diatrizoate sodium. 30 milliLiter(s) Oral once  enoxaparin Injectable 30 milliGRAM(s) SubCutaneous daily  glucagon  Injectable 1 milliGRAM(s) IntraMuscular once  insulin glargine Injectable (LANTUS) 12 Unit(s) SubCutaneous at bedtime  insulin lispro (ADMELOG) corrective regimen sliding scale   SubCutaneous every 6 hours  iohexol 300 mG (iodine)/mL Oral Solution 30 milliLiter(s) Oral once  levETIRAcetam  IVPB 750 milliGRAM(s) IV Intermittent every 12 hours  melatonin 3 milliGRAM(s) Oral at bedtime  sodium chloride 0.45%. 1000 milliLiter(s) (75 mL/Hr) IV Continuous <Continuous>  sodium chloride 0.9% lock flush 3 milliLiter(s) IV Push every 8 hours  thiamine 100 milliGRAM(s) Oral daily  vancomycin  IVPB 1000 milliGRAM(s) IV Intermittent every 12 hours    MEDICATIONS  (PRN):  haloperidol    Injectable 0.25 milliGRAM(s) IV Push every 6 hours PRN Agitation      Allergies    No Known Allergies    Intolerances          Vital Signs Last 24 Hrs  T(C): 37 (21 Dec 2020 05:22), Max: 37.1 (20 Dec 2020 21:08)  T(F): 98.6 (21 Dec 2020 05:22), Max: 98.8 (20 Dec 2020 21:08)  HR: 63 (21 Dec 2020 05:22) (62 - 65)  BP: 144/61 (21 Dec 2020 05:22) (144/61 - 151/60)  BP(mean): --  RR: 16 (21 Dec 2020 05:22) (16 - 18)  SpO2: 97% (21 Dec 2020 05:22) (97% - 98%)  CAPILLARY BLOOD GLUCOSE      POCT Blood Glucose.: 118 mg/dL (21 Dec 2020 12:46)  POCT Blood Glucose.: 132 mg/dL (21 Dec 2020 09:07)  POCT Blood Glucose.: 93 mg/dL (21 Dec 2020 05:19)  POCT Blood Glucose.: 164 mg/dL (20 Dec 2020 23:48)  POCT Blood Glucose.: 176 mg/dL (20 Dec 2020 22:46)  POCT Blood Glucose.: 131 mg/dL (20 Dec 2020 17:46)    I&O's Summary    20 Dec 2020 07:01  -  21 Dec 2020 07:00  --------------------------------------------------------  IN: 500 mL / OUT: 2000 mL / NET: -1500 mL    21 Dec 2020 07:01  -  21 Dec 2020 13:28  --------------------------------------------------------  IN: 300 mL / OUT: 0 mL / NET: 300 mL          PHYSICAL EXAM:  GENERAL: thin frail elderly  HEAD:  AT, NC  EYES: EOMI, PERRLA, conjunctiva and sclera clear  ENMT: Airway patent. dry mucus membranes, poor dentition, macroglossia  NECK: Supple,   CHEST/LUNG: CTABL;  HEART: RRR; Normal S1, S2. No murmurs, rubs, or gallops  ABDOMEN: Soft, NT, ND; Bowel sounds present. No organomegaly, PEG tube in place  GI/-zamudio inplace with yellow urine,   EXTREMITIES:  2+ Peripheral Pulses, No clubbing, cyanosis, or edema  PSYCH: affect is stable and appropriate  NEUROLOGY: no obvious focal motor deficits  SKIN: sacral decubitus        LABS:                        7.8    15.62 )-----------( 246      ( 21 Dec 2020 08:47 )             25.9     12-21    143  |  109<H>  |  16  ----------------------------<  84  3.9   |  27  |  0.72    Ca    8.1<L>      21 Dec 2020 08:47  Phos  2.7     12-21  Mg     2.0     12-21                        RADIOLOGY & ADDITIONAL TESTS:    Imaging Personally Reviewed: YES    Consultant(s) Notes Reviewed: YES    Care Discussed with Consultants/Other Providers: YES

## 2020-12-22 ENCOUNTER — TRANSCRIPTION ENCOUNTER (OUTPATIENT)
Age: 85
End: 2020-12-22

## 2020-12-22 LAB
ANION GAP SERPL CALC-SCNC: 11 MMOL/L — SIGNIFICANT CHANGE UP (ref 7–14)
BASOPHILS # BLD AUTO: 0.12 K/UL — SIGNIFICANT CHANGE UP (ref 0–0.2)
BASOPHILS NFR BLD AUTO: 0.8 % — SIGNIFICANT CHANGE UP (ref 0–2)
BUN SERPL-MCNC: 17 MG/DL — SIGNIFICANT CHANGE UP (ref 7–23)
CALCIUM SERPL-MCNC: 8.2 MG/DL — LOW (ref 8.4–10.5)
CHLORIDE SERPL-SCNC: 108 MMOL/L — HIGH (ref 98–107)
CO2 SERPL-SCNC: 24 MMOL/L — SIGNIFICANT CHANGE UP (ref 22–31)
CREAT SERPL-MCNC: 0.77 MG/DL — SIGNIFICANT CHANGE UP (ref 0.5–1.3)
EOSINOPHIL # BLD AUTO: 0.64 K/UL — HIGH (ref 0–0.5)
EOSINOPHIL NFR BLD AUTO: 4.5 % — SIGNIFICANT CHANGE UP (ref 0–6)
GLUCOSE SERPL-MCNC: 83 MG/DL — SIGNIFICANT CHANGE UP (ref 70–99)
HCT VFR BLD CALC: 25.2 % — LOW (ref 39–50)
HGB BLD-MCNC: 8 G/DL — LOW (ref 13–17)
IANC: 10.62 K/UL — HIGH (ref 1.5–8.5)
IMM GRANULOCYTES NFR BLD AUTO: 2.2 % — HIGH (ref 0–1.5)
LYMPHOCYTES # BLD AUTO: 1.75 K/UL — SIGNIFICANT CHANGE UP (ref 1–3.3)
LYMPHOCYTES # BLD AUTO: 12.2 % — LOW (ref 13–44)
MCHC RBC-ENTMCNC: 31.7 GM/DL — LOW (ref 32–36)
MCHC RBC-ENTMCNC: 31.9 PG — SIGNIFICANT CHANGE UP (ref 27–34)
MCV RBC AUTO: 100.4 FL — HIGH (ref 80–100)
MONOCYTES # BLD AUTO: 0.91 K/UL — HIGH (ref 0–0.9)
MONOCYTES NFR BLD AUTO: 6.3 % — SIGNIFICANT CHANGE UP (ref 2–14)
NEUTROPHILS # BLD AUTO: 10.62 K/UL — HIGH (ref 1.8–7.4)
NEUTROPHILS NFR BLD AUTO: 74 % — SIGNIFICANT CHANGE UP (ref 43–77)
NRBC # BLD: 0 /100 WBCS — SIGNIFICANT CHANGE UP
NRBC # FLD: 0 K/UL — SIGNIFICANT CHANGE UP
PLATELET # BLD AUTO: 270 K/UL — SIGNIFICANT CHANGE UP (ref 150–400)
POTASSIUM SERPL-MCNC: 3.8 MMOL/L — SIGNIFICANT CHANGE UP (ref 3.5–5.3)
POTASSIUM SERPL-SCNC: 3.8 MMOL/L — SIGNIFICANT CHANGE UP (ref 3.5–5.3)
RBC # BLD: 2.51 M/UL — LOW (ref 4.2–5.8)
RBC # FLD: 15.9 % — HIGH (ref 10.3–14.5)
SODIUM SERPL-SCNC: 143 MMOL/L — SIGNIFICANT CHANGE UP (ref 135–145)
VANCOMYCIN TROUGH SERPL-MCNC: 26.8 UG/ML — CRITICAL HIGH (ref 10–20)
WBC # BLD: 14.35 K/UL — HIGH (ref 3.8–10.5)
WBC # FLD AUTO: 14.35 K/UL — HIGH (ref 3.8–10.5)

## 2020-12-22 PROCEDURE — 99233 SBSQ HOSP IP/OBS HIGH 50: CPT | Mod: GC

## 2020-12-22 PROCEDURE — 99232 SBSQ HOSP IP/OBS MODERATE 35: CPT

## 2020-12-22 PROCEDURE — 36573 INSJ PICC RS&I 5 YR+: CPT

## 2020-12-22 RX ADMIN — SODIUM CHLORIDE 3 MILLILITER(S): 9 INJECTION INTRAMUSCULAR; INTRAVENOUS; SUBCUTANEOUS at 21:53

## 2020-12-22 RX ADMIN — Medication 250 MILLIGRAM(S): at 09:59

## 2020-12-22 RX ADMIN — CHLORHEXIDINE GLUCONATE 1 APPLICATION(S): 213 SOLUTION TOPICAL at 14:02

## 2020-12-22 RX ADMIN — Medication 1: at 14:00

## 2020-12-22 RX ADMIN — LEVETIRACETAM 400 MILLIGRAM(S): 250 TABLET, FILM COATED ORAL at 19:14

## 2020-12-22 RX ADMIN — SODIUM CHLORIDE 3 MILLILITER(S): 9 INJECTION INTRAMUSCULAR; INTRAVENOUS; SUBCUTANEOUS at 14:02

## 2020-12-22 RX ADMIN — Medication 3 MILLIGRAM(S): at 22:09

## 2020-12-22 RX ADMIN — LEVETIRACETAM 400 MILLIGRAM(S): 250 TABLET, FILM COATED ORAL at 06:00

## 2020-12-22 RX ADMIN — Medication 100 MILLIGRAM(S): at 14:02

## 2020-12-22 RX ADMIN — INSULIN GLARGINE 12 UNIT(S): 100 INJECTION, SOLUTION SUBCUTANEOUS at 22:10

## 2020-12-22 RX ADMIN — SODIUM CHLORIDE 3 MILLILITER(S): 9 INJECTION INTRAMUSCULAR; INTRAVENOUS; SUBCUTANEOUS at 05:29

## 2020-12-22 RX ADMIN — ENOXAPARIN SODIUM 30 MILLIGRAM(S): 100 INJECTION SUBCUTANEOUS at 14:01

## 2020-12-22 NOTE — DISCHARGE NOTE PROVIDER - NSDCMRMEDTOKEN_GEN_ALL_CORE_FT
amLODIPine 10 mg oral tablet: 1 tab(s) by gastrostomy tube once a day  aspirin 81 mg oral tablet: 1 tab(s) by gastrostomy tube once a day  atorvastatin 80 mg oral tablet: 1 tab(s) by gastrostomy tube once a day  doxazosin 8 mg oral tablet: 1 tab(s) by gastrostomy tube once a day  folic acid 1 mg oral tablet: 1 tab(s) by gastrostomy tube once a day  HumuLIN N KwikPen 100 units/mL subcutaneous suspension: 3 unit(s) subcutaneous 3 times a day  hydrALAZINE 10 mg oral tablet: 1 tab(s) orally every 8 hours  levETIRAcetam 100 mg/mL oral solution: 500 milligram(s) by gastrostomy tube every 12 hours  Multiple Vitamins oral tablet: 1 tab(s) by gastrostomy tube once a day  Vitamin B-12 100 mcg oral tablet: 1 tab(s) by gastrostomy tube once a day   aspirin 81 mg oral tablet: 1 tab(s) by gastrostomy tube once a day  atorvastatin 80 mg oral tablet: 1 tab(s) by gastrostomy tube once a day  folic acid 1 mg oral tablet: 1 tab(s) by gastrostomy tube once a day  haloperidol: 0.25 milligram(s) injectable every 6 hours, As Needed  insulin glargine: 12 unit(s) by injection into the soft tissue once a day (at bedtime)  Keppra 750 mg oral tablet: 1 tab(s) orally 2 times a day   Lovenox 40 mg/0.4 mL injectable solution: 40 milligram(s) injectable once a day  melatonin 3 mg oral tablet: 1 tab(s) orally once a day (at bedtime)  thiamine 100 mg oral tablet: 1 tab(s) orally once a day  Vitamin B-12 100 mcg oral tablet: 1 tab(s) by gastrostomy tube once a day   aspirin 81 mg oral tablet: 1 tab(s) by gastrostomy tube once a day  atorvastatin 80 mg oral tablet: 1 tab(s) by gastrostomy tube once a day  enoxaparin: 30 milligram(s) subcutaneously once a day  folic acid 1 mg oral tablet: 1 tab(s) by gastrostomy tube once a day  haloperidol: 0.25 milligram(s) injectable every 6 hours, As Needed  insulin glargine: 12 unit(s) by injection into the soft tissue once a day (at bedtime)  Keppra 750 mg oral tablet: 1 tab(s) orally 2 times a day   melatonin 3 mg oral tablet: 1 tab(s) orally once a day (at bedtime)  thiamine 100 mg oral tablet: 1 tab(s) orally once a day  vancomycin 1 g intravenous injection: 1 gram(s) intravenous once a day  Vitamin B-12 100 mcg oral tablet: 1 tab(s) by gastrostomy tube once a day   aspirin 81 mg oral tablet: 1 tab(s) by gastrostomy tube once a day  atorvastatin 80 mg oral tablet: 1 tab(s) by gastrostomy tube once a day  enoxaparin: 30 milligram(s) subcutaneously once a day  folic acid 1 mg oral tablet: 1 tab(s) by gastrostomy tube once a day  haloperidol: 0.25 milligram(s) injectable every 6 hours, As Needed  insulin glargine: 12 unit(s) by injection into the soft tissue once a day (at bedtime). He was on this regimen while on continuous feeds.  Keppra 750 mg oral tablet: 1 tab(s) orally 2 times a day   melatonin 3 mg oral tablet: 1 tab(s) orally once a day (at bedtime)  thiamine 100 mg oral tablet: 1 tab(s) orally once a day  vancomycin 1 g intravenous injection: 1 gram(s) intravenous once a day. Take until 1/26/2020 but the dose may change as your primary care doctor monitors your drug levels.  Vitamin B-12 100 mcg oral tablet: 1 tab(s) by gastrostomy tube once a day

## 2020-12-22 NOTE — DISCHARGE NOTE PROVIDER - NSDCCPCAREPLAN_GEN_ALL_CORE_FT
PRINCIPAL DISCHARGE DIAGNOSIS  Diagnosis: Bacterial sepsis  Assessment and Plan of Treatment: You were diagnosed with sepsis. Tests were run that showed a bacteria known as MRSA growing in your blood. This may be due infection through a wound on your skin or due to the bacteria spreading from one of your heart valves. You are treated for this infection by taking an antibiotic known as Vancomycin. You will continue to take vancomycin for 34 more days until the course is complete. If you develop any return of fever or weakness you  should seek medical attention as this could be a sign of worsening infection       PRINCIPAL DISCHARGE DIAGNOSIS  Diagnosis: Bacterial sepsis  Assessment and Plan of Treatment: You were diagnosed with sepsis. Tests were run that showed a bacteria known as MRSA growing in your blood. This may be due infection through a wound on your skin or due to the bacteria spreading from one of your heart valves. You are treated for this infection by taking an antibiotic known as Vancomycin. You will continue to take vancomycin for 34 more days until the course is complete. If you develop any return of fever or weakness you  should seek medical attention as this could be a sign of worsening infection      SECONDARY DISCHARGE DIAGNOSES  Diagnosis: DM2 (diabetes mellitus, type 2)  Assessment and Plan of Treatment: He is maintained on 12 U lantus while on continuous feeds.    Diagnosis: Discharge planning issues  Assessment and Plan of Treatment: Please show this to your outpatient doctors:  The patient is a 89 year old man, bedbound, resident at  [facility] with past medical Hx significant for HTN, DM, [place], h/o chronic ETOH abuse, ICH s/p fall (recently treated at [facility] on 11/3/20 for ICH s/p fall & DKA) started on Keppra for seizure ppx also with dysphagia s/p PEG placement and chronic zamudio. While at [facility] pt's PEG tube was dislodged s/p PEG replacement at [facility] transferred to [facility] to verify PEG tube placement. Patient is awake however with limited cognition, able to provide only simplified ROS, voices no complaints.   PEG tube was confirmed in place with gastrograffin xray.  Blood cx grew mrsa.  CT chest revealed cavitary lesions suspicious for septic emboli. TTE was inconclusive to rule out infective endocarditis. MAXX was not undertaken because it would not  as the patient was already prescribed a 6 week course of antibiotics and was not a surgical candidate. Vancomycin will be continued as 1 gram daily until Jan 26, 2021.  The patient was continued on seizure prophylaxis with keppra due to recent ICH. After Conversation with the patient's family regarding the risk vs benefit of restarting AC the decision was made to hold full anticoagulation for afib due to the patient's functional status and the risks of resuming AC.  The patient was evaluated for a sacral pressure ulcer- Stage 3 pressure injury [id number]- 100% pale-pink adipose tissue with fibrin film, well defined irregular borders. Wound edges with maceration circumferentially, additionally there is purple-maroon discoloration along wound edge from 3- 7 o'clock extending 0.2cm. Scant serous drainage. Periwound skin with blanchable erythema circumferentially extending 1.5cm, no palpable skin changes, no induration, no increased warmth, no edema noted. The patient's blood pressures were controlled off medication. They were held. The patient's mental status improved over the course of his admission.

## 2020-12-22 NOTE — PROGRESS NOTE ADULT - PROBLEM SELECTOR PLAN 6
hx of ICH, hospitalized at [facility] in November 2/2 fall and traumatic ICH  continue Keppra for seizure prophylaxis.  Neuro cleared patient to resume full AC to prevent future thromboembolism in the setting of recent atrial fibrillation   Risks and benefits discussion to be revisited with the patient's family regarding starting AC.

## 2020-12-22 NOTE — CHART NOTE - NSCHARTNOTEFT_GEN_A_CORE
The patient is to receive a PICC to continue IV Vancomycin for a 6 week course for treatment of MRSA bacteremia.  Attending Physician Dr. Danielle Bonilla

## 2020-12-22 NOTE — PROGRESS NOTE ADULT - SUBJECTIVE AND OBJECTIVE BOX
HERVE NGUYEN 89y [id number]    Patient is a 89y old  Male who presents with a chief complaint of PEG tube dislodgement (22 Dec 2020 08:38)      Follow Up/CC:  ID following for bacteremia    Interval History/ROS: no fever    Allergies    No Known Allergies    Intolerances        ANTIMICROBIALS:  vancomycin  IVPB 1000 every 12 hours      MEDICATIONS  (STANDING):  chlorhexidine 4% Liquid 1 Application(s) Topical daily  dextrose 40% Gel 15 Gram(s) Oral once  dextrose 5%. 1000 milliLiter(s) (50 mL/Hr) IV Continuous <Continuous>  dextrose 5%. 1000 milliLiter(s) (100 mL/Hr) IV Continuous <Continuous>  dextrose 50% Injectable 25 Gram(s) IV Push once  dextrose 50% Injectable 12.5 Gram(s) IV Push once  dextrose 50% Injectable 25 Gram(s) IV Push once  dextrose 50% Injectable 25 Gram(s) IV Push once  diatrizoate meglumine/diatrizoate sodium. 30 milliLiter(s) Oral once  enoxaparin Injectable 30 milliGRAM(s) SubCutaneous daily  glucagon  Injectable 1 milliGRAM(s) IntraMuscular once  insulin glargine Injectable (LANTUS) 12 Unit(s) SubCutaneous at bedtime  insulin lispro (ADMELOG) corrective regimen sliding scale   SubCutaneous every 6 hours  levETIRAcetam  IVPB 750 milliGRAM(s) IV Intermittent every 12 hours  melatonin 3 milliGRAM(s) Oral at bedtime  sodium chloride 0.45%. 1000 milliLiter(s) (75 mL/Hr) IV Continuous <Continuous>  sodium chloride 0.9% lock flush 3 milliLiter(s) IV Push every 8 hours  thiamine 100 milliGRAM(s) Oral daily  vancomycin  IVPB 1000 milliGRAM(s) IV Intermittent every 12 hours    MEDICATIONS  (PRN):  haloperidol    Injectable 0.25 milliGRAM(s) IV Push every 6 hours PRN Agitation        Vital Signs Last 24 Hrs  T(C): 37.3 (22 Dec 2020 14:11), Max: 37.3 (22 Dec 2020 14:11)  T(F): 99.1 (22 Dec 2020 14:11), Max: 99.1 (22 Dec 2020 14:11)  HR: 58 (22 Dec 2020 14:11) (57 - 61)  BP: 143/73 (22 Dec 2020 14:11) (143/73 - 159/55)  BP(mean): --  RR: 20 (22 Dec 2020 14:11) (18 - 20)  SpO2: 98% (22 Dec 2020 14:11) (97% - 98%)    CBC Full  -  ( 22 Dec 2020 09:47 )  WBC Count : 14.35 K/uL  RBC Count : 2.51 M/uL  Hemoglobin : 8.0 g/dL  Hematocrit : 25.2 %  Platelet Count - Automated : 270 K/uL  Mean Cell Volume : 100.4 fL  Mean Cell Hemoglobin : 31.9 pg  Mean Cell Hemoglobin Concentration : 31.7 gm/dL  Auto Neutrophil # : 10.62 K/uL  Auto Lymphocyte # : 1.75 K/uL  Auto Monocyte # : 0.91 K/uL  Auto Eosinophil # : 0.64 K/uL  Auto Basophil # : 0.12 K/uL  Auto Neutrophil % : 74.0 %  Auto Lymphocyte % : 12.2 %  Auto Monocyte % : 6.3 %  Auto Eosinophil % : 4.5 %  Auto Basophil % : 0.8 %    12-21    143  |  109<H>  |  16  ----------------------------<  84  3.9   |  27  |  0.72    Ca    8.1<L>      21 Dec 2020 08:47  Phos  2.7     12-21  Mg     2.0     12-21            MICROBIOLOGY:  .Blood Blood-Peripheral  12-16-20   No Growth Final  --  --      .Blood Blood  12-16-20   No Growth Final  --  --      .Blood Blood-Venous  12-15-20   Growth in aerobic and anaerobic bottles: Methicillin resistant  Staphylococcus aureus  See previous culture [id number]  --    Growth in anaerobic bottle: Gram Positive Cocci in Clusters  Growth in aerobic bottle: Gram Positive Cocci in Clusters      .Catheter PICC Tip  12-14-20   No growth  --  --      .Urine Catheterized  12-13-20   >100,000 CFU/ml Escherichia coli  --  Escherichia coli      .Blood Blood  12-13-20   Growth in aerobic and anaerobic bottles: Methicillin resistant  Staphylococcus aureus  "Due to technical problems, Proteus sp. and Pseudomonas aeruginosa will  Not be reported as part of the BCID panel  ***Blood Panel PCR results on this specimen are available  approximately 3 hours after the Gram stain result.***  Gram stain, PCR, and/or culture results may not always  correspond due to difference in methodologies.  ************************************************************  This PCR assay was performed using [employer].  The following targets are tested for: Enterococcus,  vancomycin resistant enterococci, Listeria monocytogenes,  coagulase negative staphylococci, S. aureus,  methicillin resistant S. aureus, Streptococcus agalactiae  (Group B), S. pneumoniae, S. pyogenes (Group A),  Acinetobacter baumannii, Enterobacter cloacae, E. coli,  Klebsiella oxytoca, K. pneumoniae, Proteus sp.,  Serratia marcescens, Haemophilus influenzae,  Neisseria meningitidis, Pseudomonas aeruginosa, Candida  albicans, C. glabrata, C krusei, C parapsilosis,  C. tropicalis and the KPC resistance gene.  --  Blood Culture PCR  Methicillin resistant Staphylococcus aureus      .Blood Blood  12-13-20   Growth in aerobic and anaerobic bottles: Methicillin resistant  Staphylococcus aureus See previous culture [id number]  --    Growth in aerobic bottle: Gram Positive Cocci in Clusters  Growth in anaerobic bottle: Gram Positive Cocci in Clusters          RADIOLOGY    < from: Xray Abdomen 1 View PORTABLE -Urgent (Xray Abdomen 1 View PORTABLE -Urgent .) (12.21.20 @ 19:24) >  Injected contrast is seen entering the stomach through the gastrostomy.    < end of copied text >

## 2020-12-22 NOTE — PROGRESS NOTE ADULT - PROBLEM SELECTOR PLAN 1
MRSA sepsis, unclear etiology. WBC and delirium are improving. No further workup as would not .  Continue Vancomycin 1000mg IV Q12, Vanc trough 19.6 MRSA sepsis, unclear etiology. WBC and delirium are improving. No further workup as would not .  Continue Vancomycin 1000mg IV Q12, Vanc trough 19.6  PICC placement for continued IV abx

## 2020-12-22 NOTE — PROGRESS NOTE ADULT - ATTENDING COMMENTS
89 yo M w/ hx DM and DKA, [place], CVA s/p fall , Decubitus ulcer, chronic zamudio p/w dislodged PEG. PEG replaced and confirmed. On routine labs was found to have a  elevated WBC found to have LL PNA on Xray. Bcx- Gram positive cocci clusters PCR + MRSA Ucx-Ecoli. CT chest with nodular opacity possible cavitation concern for septic emboli.   # Sepsis w/ MRSA bactermia unclear source -12/13 and 12/15 Bcx +MRSA/ IV line Cx- NGTD   - s/p zosyn for UTI Ucx E coli   -repeat Bcx- 12/16-NGTD  - TTE eval for endocarditis neg. ID recommend MAXX. pt not a candidate for any surgical procedure. MAXX unlikely to  will treat with 6 weeks of antibiotics.  -  vanco 1 g q12. vanco trough-19  - review of hospitalization records at [facility] indicate elevated WBC in 30K initial suspicion for meningitis stopped after LP. fluctuant eye lesion biopsied and Cx +MRSA. not dc'ed on antibiotics.   - optho c/s no acute intervention not source of MRSA  - given neg BCx IR consult for PICC for longterm Abx   # DM- FS q6 ISS A1c 8.8 cont lantus to 12 U. tube feeds restarted  # Hypernatremia- Improved cont free water when PEG confirmed w/o leak. MS improving. f/u AM BMP   # Prior CVA- without overt deficit. cont keppra. CT head repeat records reviewed from [place] was admitted with suspected cardioembolic CVA also was noted to have ICH and also noted to have brief episode of afib not started on AC due to ICH. repeat CT head 12/17 Age indeterminate right STACIE distribution frontal lobe infarct. No significant intracranial hemorrhage identified. Bifrontal low-density extra-axial fluid which may represent prominent subarachnoid spaces, subdural hygromas or chronic subdural hematomas.   neuro consult in regards to AC no contraindication to AC.   # PAF- CHADSVASc score 6. currently rates controlled. EKG SR. family electing not to AC.  # Decubitus Ulcer-sacral stage 3 wound care as per recs  # protein calorie malnutrition -PEG tube in place and confirmed start Glucerna 35 ml/hr as per nutrition.  Nursing with concern for dislodged PEG AXR with PEG tube in place without leak. restart tube feeds.  # Anemia- AOCD- fe studies reviewed retic count low likely in setting of infection. rectal exam consistent with brown stool keep H/H>7/21  # GOC- DNR/DNI  # Disp: PICC placement with anticipation for rehab.

## 2020-12-22 NOTE — DISCHARGE NOTE PROVIDER - NSDCFUADDAPPT_GEN_ALL_CORE_FT
Please follow up with your PCP at [facility] and the rest of your medical team at your prior living arrangement.

## 2020-12-22 NOTE — PROGRESS NOTE ADULT - ASSESSMENT
89M, bedbound resident at  [facility] PMH of HTN, DM, h/o chronic ETOH abuse, ICH s/p fall (recently treated at [facility] on 11/3/20 for ICH s/p fall & DKA) started on Keppra for seizure ppx, dysphagia s/p PEG placement and chronic zamudio admitted for sepsis. Of note pt has a PICC line for unclear reasons. Pt found to be bacteremic, blood culture growing MRSA.    #MRSA bacteremia  - blood cultures growing MRSA x2 bottles 12/13, source likely PICC line  - Stage 3 pressure ulcer does not appear acutely infected  - CT chest showing several peripheral nodular opacities some with lucency/cavitations, suspicious for septic emboli    Plan:  - continue vanc 1000mg q12h  - repeat blood cultures negative so far  - f/u vanc trough levels - keep 15-20  - TTE noted  - pt too high risk for any cardiac procedure  - likely will treat for 6 weeks with iv abx given likely septic emboli in lungs  - CT chest with opacities suspicious for septic emboli  - appreciate wound care recs  - consider palliative eval   - day 7/42 of abx

## 2020-12-22 NOTE — DISCHARGE NOTE PROVIDER - HOSPITAL COURSE
The patient is a 89 year old man, bedbound, resident at  [facility] with past medical Hx significant for HTN, DM, [place], h/o chronic ETOH abuse, ICH s/p fall (recently treated at [facility] on 11/3/20 for ICH s/p fall & DKA) started on Keppra for seizure ppx also with dysphagia s/p PEG placement and chronic zamudio. While at [facility] pt's PEG tube was dislodged s/p PEG replacement at [facility] transferred to [facility] to verify PEG tube placement. Patient is awake however with limited cognition, able to provide only simplified ROS, voices no complaints.     In the ED the patient was found to have elevated WBC and delirium. He was started empirically Vancomycin and Zosyn. PEG tube was confirmed in place with gastrograffin xray.    Right forearm- category 3 skin tear- irregular borders- [id number], 100% pale-pink friable dermis, small-moderate serosanguinous drainage, periwound skin with purpura and thin fragile skin.    Sacrum- patient turned to right side during assessment- Stage 3 pressure injury 1hff1zjo7.3cm- 100% pale-pink adipose tissue with fibrin film, well defined irregular borders. Wound edges with maceration circumferentially, additionally there is purple-maroon discoloration along wound edge from 3- 7 o'clock extending 0.2cm. Scant serous drainage. Periwound skin with blanchable erythema circumferentially extending 1.5cm, no palpable skin changes, no induration, no increased warmth, no edema notedTopical recommendations:  Peritubular skin- Cleanse q shift with NS, apply liquid barrier film circumeferntially. Secondary securement to abdomen taping in 'H' fashion with Steri-strips.     Right forearm skin tear- Cleanse wound and periwound skin with NS, pat dry. Apply Liquid barrier film to periwound skin. Covere with silicone foam with border. Change every 3 days.    Sacrum- cleanse wound and periwound skin with SAF-clens, rinse with NS, pat dry. Apply Liquid barrier film to periwound skin and to bilateral buttock and perinanal area. Apply Medihoney hydrocolloid to wound base, cover with silicone foam with border. Change daily and prn with episodes of incontinence.    Apply sween 24 emollient to bilateral upper and lower extremities daily.    Continue low air loss bed therapy, continue heel elevation with Z-flex fluidized positioning boots, continue to turn & reposition q2h with Z-anais fluidized positioning device, soft pillow between bony prominences, continue use of single breathable pad, continue measures to decrease friction/shear/pressure.  IV & Vanco IV; The patient is a 89 year old man, bedbound, resident at  [facility] with past medical Hx significant for HTN, DM, [place], h/o chronic ETOH abuse, ICH s/p fall (recently treated at [facility] on 11/3/20 for ICH s/p fall & DKA) started on Keppra for seizure ppx also with dysphagia s/p PEG placement and chronic zamudio. While at [facility] pt's PEG tube was dislodged s/p PEG replacement at [facility] transferred to [facility] to verify PEG tube placement. Patient is awake however with limited cognition, able to provide only simplified ROS, voices no complaints.     In the ED the patient was found to have elevated WBC and delirium. He was started empirically Vancomycin and Zosyn. PEG tube was confirmed in place with gastrograffin xray.    The patient's blood cultures were positive for MRSA in multiple bottles.  CT chest revealed cavitary lesions suspicious for septic emboli. TTE was inconclusive to rule out infective endocarditis. MAXX was not undertaken because it would not  as the patient was already prescribed a 6 week course of antibiotics and was not a surgical candidate. Vancomycin will be continued as 1 gram daily until Jan 26, 2021.    Early in the patient's course he experience delirium and agitation requiring PRN Haldol 0.25mg IV [id number] PRN.     The patient was continued on seizure prophylaxis with keppra due to recent ICH. After Conversation with the patient's family regarding the risk vs benefit of restarting AC the decision was made to hold full anticoagulation for afib due to the patient's functional status and the risks of resuming AC.    The patient was evaluated for a sacral pressure ulcer- Stage 3 pressure injury 1amw0twn2.3cm- 100% pale-pink adipose tissue with fibrin film, well defined irregular borders. Wound edges with maceration circumferentially, additionally there is purple-maroon discoloration along wound edge from 3- 7 o'clock extending 0.2cm. Scant serous drainage. Periwound skin with blanchable erythema circumferentially extending 1.5cm, no palpable skin changes, no induration, no increased warmth, no edema noted.    The patient's blood pressures were controlled off medication.    The patient's mental status improved over the course of his admission. By the time of discharge he was alert to voice and oriented to person, hospital and year. He followed commands and answered questions appropriately when questions were addressed to him in a very loud volume     The patient is a 89 year old man, bedbound, resident at  [facility] with past medical Hx significant for HTN, DM, [place], h/o chronic ETOH abuse, ICH s/p fall (recently treated at [facility] on 11/3/20 for ICH s/p fall & DKA) started on Keppra for seizure ppx also with dysphagia s/p PEG placement and chronic zamudio. While at [facility] pt's PEG tube was dislodged s/p PEG replacement at [facility] transferred to [facility] to verify PEG tube placement. Patient is awake however with limited cognition, able to provide only simplified ROS, voices no complaints.     In the ED the patient was found to have elevated WBC and delirium. He was started empirically Vancomycin and Zosyn. PEG tube was confirmed in place with gastrograffin xray.    The patient's blood cultures were positive for MRSA in multiple bottles.  CT chest revealed cavitary lesions suspicious for septic emboli. TTE was inconclusive to rule out infective endocarditis. MAXX was not undertaken because it would not  as the patient was already prescribed a 6 week course of antibiotics and was not a surgical candidate. Vancomycin will be continued as 1 gram daily until Jan 26, 2021.    Early in the patient's course he experience delirium and agitation requiring PRN Haldol 0.25mg IV [id number] PRN.     The patient was continued on seizure prophylaxis with keppra due to recent ICH. After Conversation with the patient's family regarding the risk vs benefit of restarting AC the decision was made to hold full anticoagulation for afib due to the patient's functional status and the risks of resuming AC.    The patient was evaluated for a sacral pressure ulcer- Stage 3 pressure injury 0lof4ncd9.3cm- 100% pale-pink adipose tissue with fibrin film, well defined irregular borders. Wound edges with maceration circumferentially, additionally there is purple-maroon discoloration along wound edge from 3- 7 o'clock extending 0.2cm. Scant serous drainage. Periwound skin with blanchable erythema circumferentially extending 1.5cm, no palpable skin changes, no induration, no increased warmth, no edema noted.    The patient's blood pressures were controlled off medication. They were held.    The patient's mental status improved over the course of his admission. By the time of discharge he was alert to voice and oriented to person, hospital and year. He followed commands and answered questions appropriately when questions were addressed to him in a very loud volume.

## 2020-12-22 NOTE — PROGRESS NOTE ADULT - ATTENDING COMMENTS
Amandeep Briceño  Attending Physician   Division of Infectious Disease  Pager #[phone]  After 5pm/weekend or no response, call #[phone]

## 2020-12-22 NOTE — PROGRESS NOTE ADULT - SUBJECTIVE AND OBJECTIVE BOX
Darnell Elkins MD PGY1   Team 7  Contact/Pager - [phone]      Patient is a 89y old  Male who presents with a chief complaint of PEG tube dislodgement (22 Dec 2020 08:38)        SUBJECTIVE / OVERNIGHT EVENTS: The patient's tube feeds were restarted in the evening after Xray gastrograffin study confirmed placement. No other events. The patient is arousable to voice. He is aware he is in the hospital. He could state the year. He denies any pain. He asks "Does the TV work?" and asks if it can be turned on.       MEDICATIONS  (STANDING):  chlorhexidine 4% Liquid 1 Application(s) Topical daily  dextrose 40% Gel 15 Gram(s) Oral once  dextrose 5%. 1000 milliLiter(s) (50 mL/Hr) IV Continuous <Continuous>  dextrose 5%. 1000 milliLiter(s) (100 mL/Hr) IV Continuous <Continuous>  dextrose 50% Injectable 25 Gram(s) IV Push once  dextrose 50% Injectable 12.5 Gram(s) IV Push once  dextrose 50% Injectable 25 Gram(s) IV Push once  dextrose 50% Injectable 25 Gram(s) IV Push once  diatrizoate meglumine/diatrizoate sodium. 30 milliLiter(s) Oral once  enoxaparin Injectable 30 milliGRAM(s) SubCutaneous daily  glucagon  Injectable 1 milliGRAM(s) IntraMuscular once  insulin glargine Injectable (LANTUS) 12 Unit(s) SubCutaneous at bedtime  insulin lispro (ADMELOG) corrective regimen sliding scale   SubCutaneous every 6 hours  levETIRAcetam  IVPB 750 milliGRAM(s) IV Intermittent every 12 hours  melatonin 3 milliGRAM(s) Oral at bedtime  sodium chloride 0.45%. 1000 milliLiter(s) (75 mL/Hr) IV Continuous <Continuous>  sodium chloride 0.9% lock flush 3 milliLiter(s) IV Push every 8 hours  thiamine 100 milliGRAM(s) Oral daily  vancomycin  IVPB 1000 milliGRAM(s) IV Intermittent every 12 hours    MEDICATIONS  (PRN):  haloperidol    Injectable 0.25 milliGRAM(s) IV Push every 6 hours PRN Agitation      Allergies    No Known Allergies    Intolerances          Vital Signs Last 24 Hrs  T(C): 37 (22 Dec 2020 06:33), Max: 37.1 (21 Dec 2020 22:38)  T(F): 98.6 (22 Dec 2020 06:33), Max: 98.7 (21 Dec 2020 22:38)  HR: 61 (22 Dec 2020 06:33) (57 - 61)  BP: 146/60 (22 Dec 2020 06:33) (146/60 - 159/55)  BP(mean): --  RR: 19 (22 Dec 2020 06:33) (18 - 19)  SpO2: 98% (22 Dec 2020 06:33) (97% - 100%)  CAPILLARY BLOOD GLUCOSE      POCT Blood Glucose.: 101 mg/dL (22 Dec 2020 06:38)  POCT Blood Glucose.: 110 mg/dL (21 Dec 2020 22:13)  POCT Blood Glucose.: 90 mg/dL (21 Dec 2020 17:45)  POCT Blood Glucose.: 118 mg/dL (21 Dec 2020 12:46)  POCT Blood Glucose.: 132 mg/dL (21 Dec 2020 09:07)    I&O's Summary    21 Dec 2020 07:01  -  22 Dec 2020 07:00  --------------------------------------------------------  IN: 605 mL / OUT: 1150 mL / NET: -545 mL        PHYSICAL EXAM:  GENERAL: thin frail elderly, alert  HEAD:  AT, NC  EYES: EOMI, PERRLA, conjunctiva and sclera clear  ENMT: Airway patent. dry mucus membranes, poor dentition, macroglossia  NECK: Supple,   CHEST/LUNG: CTABL;  HEART: RRR; Normal S1, S2. No murmurs, rubs, or gallops  ABDOMEN: Soft, NT, ND; Bowel sounds present. No organomegaly, PEG tube in place  GI/-zamudio inplace with yellow urine,   EXTREMITIES:  2+ Peripheral Pulses, No clubbing, cyanosis, or edema  PSYCH: affect is stable and appropriate  NEUROLOGY: no obvious focal motor deficits  SKIN: sacral decubitus    LABS:                        7.8    15.62 )-----------( 246      ( 21 Dec 2020 08:47 )             25.9     12-21    143  |  109<H>  |  16  ----------------------------<  84  3.9   |  27  |  0.72    Ca    8.1<L>      21 Dec 2020 08:47  Phos  2.7     12-21  Mg     2.0     12-21                        RADIOLOGY & ADDITIONAL TESTS:    Imaging Personally Reviewed: YES    Consultant(s) Notes Reviewed: YES    Care Discussed with Consultants/Other Providers: YES

## 2020-12-22 NOTE — DISCHARGE NOTE PROVIDER - DETAILS OF MALNUTRITION DIAGNOSIS/DIAGNOSES
This patient has been assessed with a concern for Malnutrition and was treated during this hospitalization for the following Nutrition diagnosis/diagnoses:     -  12/14/2020: Severe protein-calorie malnutrition   -  12/14/2020: Underweight/BMI < 19   This patient has been assessed with a concern for Malnutrition and was treated during this hospitalization for the following Nutrition diagnosis/diagnoses:     -  12/14/2020: Severe protein-calorie malnutrition   -  12/14/2020: Underweight/BMI < 19    This patient has been assessed with a concern for Malnutrition and was treated during this hospitalization for the following Nutrition diagnosis/diagnoses:     -  12/14/2020: Severe protein-calorie malnutrition   -  12/14/2020: Underweight/BMI < 19   This patient has been assessed with a concern for Malnutrition and was treated during this hospitalization for the following Nutrition diagnosis/diagnoses:     -  12/14/2020: Severe protein-calorie malnutrition   -  12/14/2020: Underweight/BMI < 19    This patient has been assessed with a concern for Malnutrition and was treated during this hospitalization for the following Nutrition diagnosis/diagnoses:     -  12/14/2020: Severe protein-calorie malnutrition   -  12/14/2020: Underweight/BMI < 19    This patient has been assessed with a concern for Malnutrition and was treated during this hospitalization for the following Nutrition diagnosis/diagnoses:     -  12/14/2020: Severe protein-calorie malnutrition   -  12/14/2020: Underweight/BMI < 19   This patient has been assessed with a concern for Malnutrition and was treated during this hospitalization for the following Nutrition diagnosis/diagnoses:     -  12/14/2020: Severe protein-calorie malnutrition   -  12/14/2020: Underweight/BMI < 19    This patient has been assessed with a concern for Malnutrition and was treated during this hospitalization for the following Nutrition diagnosis/diagnoses:     -  12/14/2020: Severe protein-calorie malnutrition   -  12/14/2020: Underweight/BMI < 19    This patient has been assessed with a concern for Malnutrition and was treated during this hospitalization for the following Nutrition diagnosis/diagnoses:     -  12/14/2020: Severe protein-calorie malnutrition   -  12/14/2020: Underweight/BMI < 19    This patient has been assessed with a concern for Malnutrition and was treated during this hospitalization for the following Nutrition diagnosis/diagnoses:     -  12/14/2020: Severe protein-calorie malnutrition   -  12/14/2020: Underweight/BMI < 19

## 2020-12-23 ENCOUNTER — TRANSCRIPTION ENCOUNTER (OUTPATIENT)
Age: 85
End: 2020-12-23

## 2020-12-23 VITALS
RESPIRATION RATE: 16 BRPM | OXYGEN SATURATION: 100 % | TEMPERATURE: 99 F | SYSTOLIC BLOOD PRESSURE: 142 MMHG | HEART RATE: 57 BPM | DIASTOLIC BLOOD PRESSURE: 48 MMHG

## 2020-12-23 LAB
ANION GAP SERPL CALC-SCNC: 5 MMOL/L — LOW (ref 7–14)
BASOPHILS # BLD AUTO: 0.08 K/UL — SIGNIFICANT CHANGE UP (ref 0–0.2)
BASOPHILS NFR BLD AUTO: 0.5 % — SIGNIFICANT CHANGE UP (ref 0–2)
BUN SERPL-MCNC: 17 MG/DL — SIGNIFICANT CHANGE UP (ref 7–23)
CALCIUM SERPL-MCNC: 8.3 MG/DL — LOW (ref 8.4–10.5)
CHLORIDE SERPL-SCNC: 108 MMOL/L — HIGH (ref 98–107)
CO2 SERPL-SCNC: 28 MMOL/L — SIGNIFICANT CHANGE UP (ref 22–31)
CREAT SERPL-MCNC: 0.74 MG/DL — SIGNIFICANT CHANGE UP (ref 0.5–1.3)
EOSINOPHIL # BLD AUTO: 0.71 K/UL — HIGH (ref 0–0.5)
EOSINOPHIL NFR BLD AUTO: 4.8 % — SIGNIFICANT CHANGE UP (ref 0–6)
GLUCOSE SERPL-MCNC: 103 MG/DL — HIGH (ref 70–99)
HCT VFR BLD CALC: 25.1 % — LOW (ref 39–50)
HGB BLD-MCNC: 7.9 G/DL — LOW (ref 13–17)
IANC: 11.06 K/UL — HIGH (ref 1.5–8.5)
IMM GRANULOCYTES NFR BLD AUTO: 1.7 % — HIGH (ref 0–1.5)
LYMPHOCYTES # BLD AUTO: 1.8 K/UL — SIGNIFICANT CHANGE UP (ref 1–3.3)
LYMPHOCYTES # BLD AUTO: 12.1 % — LOW (ref 13–44)
MAGNESIUM SERPL-MCNC: 2.1 MG/DL — SIGNIFICANT CHANGE UP (ref 1.6–2.6)
MCHC RBC-ENTMCNC: 31.1 PG — SIGNIFICANT CHANGE UP (ref 27–34)
MCHC RBC-ENTMCNC: 31.5 GM/DL — LOW (ref 32–36)
MCV RBC AUTO: 98.8 FL — SIGNIFICANT CHANGE UP (ref 80–100)
MONOCYTES # BLD AUTO: 1.01 K/UL — HIGH (ref 0–0.9)
MONOCYTES NFR BLD AUTO: 6.8 % — SIGNIFICANT CHANGE UP (ref 2–14)
NEUTROPHILS # BLD AUTO: 11.06 K/UL — HIGH (ref 1.8–7.4)
NEUTROPHILS NFR BLD AUTO: 74.1 % — SIGNIFICANT CHANGE UP (ref 43–77)
NRBC # BLD: 0 /100 WBCS — SIGNIFICANT CHANGE UP
NRBC # FLD: 0 K/UL — SIGNIFICANT CHANGE UP
PHOSPHATE SERPL-MCNC: 3 MG/DL — SIGNIFICANT CHANGE UP (ref 2.5–4.5)
PLATELET # BLD AUTO: 262 K/UL — SIGNIFICANT CHANGE UP (ref 150–400)
POTASSIUM SERPL-MCNC: 3.8 MMOL/L — SIGNIFICANT CHANGE UP (ref 3.5–5.3)
POTASSIUM SERPL-SCNC: 3.8 MMOL/L — SIGNIFICANT CHANGE UP (ref 3.5–5.3)
RBC # BLD: 2.54 M/UL — LOW (ref 4.2–5.8)
RBC # FLD: 16.1 % — HIGH (ref 10.3–14.5)
SARS-COV-2 RNA SPEC QL NAA+PROBE: SIGNIFICANT CHANGE UP
SODIUM SERPL-SCNC: 141 MMOL/L — SIGNIFICANT CHANGE UP (ref 135–145)
VANCOMYCIN FLD-MCNC: 18 UG/ML — SIGNIFICANT CHANGE UP
WBC # BLD: 14.91 K/UL — HIGH (ref 3.8–10.5)
WBC # FLD AUTO: 14.91 K/UL — HIGH (ref 3.8–10.5)

## 2020-12-23 PROCEDURE — 99232 SBSQ HOSP IP/OBS MODERATE 35: CPT

## 2020-12-23 PROCEDURE — 99239 HOSP IP/OBS DSCHRG MGMT >30: CPT

## 2020-12-23 RX ORDER — THIAMINE MONONITRATE (VIT B1) 100 MG
1 TABLET ORAL
Qty: 0 | Refills: 0 | DISCHARGE
Start: 2020-12-23

## 2020-12-23 RX ORDER — AMLODIPINE BESYLATE 2.5 MG/1
1 TABLET ORAL
Qty: 0 | Refills: 0 | DISCHARGE

## 2020-12-23 RX ORDER — ENOXAPARIN SODIUM 100 MG/ML
30 INJECTION SUBCUTANEOUS
Qty: 0 | Refills: 0 | DISCHARGE
Start: 2020-12-23

## 2020-12-23 RX ORDER — INSULIN GLARGINE 100 [IU]/ML
12 INJECTION, SOLUTION SUBCUTANEOUS
Qty: 0 | Refills: 0 | DISCHARGE
Start: 2020-12-23

## 2020-12-23 RX ORDER — VANCOMYCIN HCL 1 G
750 VIAL (EA) INTRAVENOUS EVERY 24 HOURS
Refills: 0 | Status: DISCONTINUED | OUTPATIENT
Start: 2020-12-23 | End: 2020-12-23

## 2020-12-23 RX ORDER — DOXAZOSIN MESYLATE 4 MG
1 TABLET ORAL
Qty: 0 | Refills: 0 | DISCHARGE

## 2020-12-23 RX ORDER — HUMAN INSULIN 100 [IU]/ML
3 INJECTION, SUSPENSION SUBCUTANEOUS
Qty: 0 | Refills: 0 | DISCHARGE

## 2020-12-23 RX ORDER — LANOLIN ALCOHOL/MO/W.PET/CERES
1 CREAM (GRAM) TOPICAL
Qty: 0 | Refills: 0 | DISCHARGE
Start: 2020-12-23

## 2020-12-23 RX ORDER — LEVETIRACETAM 250 MG/1
50 TABLET, FILM COATED ORAL
Qty: 0 | Refills: 0 | DISCHARGE
Start: 2020-12-23

## 2020-12-23 RX ORDER — LEVETIRACETAM 250 MG/1
1 TABLET, FILM COATED ORAL
Qty: 60 | Refills: 0
Start: 2020-12-23 | End: 2021-01-21

## 2020-12-23 RX ORDER — ENOXAPARIN SODIUM 100 MG/ML
40 INJECTION SUBCUTANEOUS
Qty: 0 | Refills: 0 | DISCHARGE

## 2020-12-23 RX ORDER — VANCOMYCIN HCL 1 G
1 VIAL (EA) INTRAVENOUS
Qty: 0 | Refills: 0 | DISCHARGE
Start: 2020-12-23

## 2020-12-23 RX ORDER — VANCOMYCIN HCL 1 G
1000 VIAL (EA) INTRAVENOUS DAILY
Refills: 0 | Status: DISCONTINUED | OUTPATIENT
Start: 2020-12-24 | End: 2020-12-23

## 2020-12-23 RX ORDER — HYDRALAZINE HCL 50 MG
1 TABLET ORAL
Qty: 0 | Refills: 0 | DISCHARGE

## 2020-12-23 RX ORDER — HALOPERIDOL DECANOATE 100 MG/ML
0.25 INJECTION INTRAMUSCULAR
Qty: 0 | Refills: 0 | DISCHARGE
Start: 2020-12-23

## 2020-12-23 RX ORDER — LEVETIRACETAM 250 MG/1
500 TABLET, FILM COATED ORAL
Qty: 0 | Refills: 0 | DISCHARGE

## 2020-12-23 RX ADMIN — LEVETIRACETAM 400 MILLIGRAM(S): 250 TABLET, FILM COATED ORAL at 07:52

## 2020-12-23 RX ADMIN — SODIUM CHLORIDE 3 MILLILITER(S): 9 INJECTION INTRAMUSCULAR; INTRAVENOUS; SUBCUTANEOUS at 07:08

## 2020-12-23 RX ADMIN — CHLORHEXIDINE GLUCONATE 1 APPLICATION(S): 213 SOLUTION TOPICAL at 13:55

## 2020-12-23 RX ADMIN — Medication 100 MILLIGRAM(S): at 13:54

## 2020-12-23 RX ADMIN — ENOXAPARIN SODIUM 30 MILLIGRAM(S): 100 INJECTION SUBCUTANEOUS at 13:55

## 2020-12-23 RX ADMIN — SODIUM CHLORIDE 3 MILLILITER(S): 9 INJECTION INTRAMUSCULAR; INTRAVENOUS; SUBCUTANEOUS at 13:55

## 2020-12-23 NOTE — DISCHARGE NOTE NURSING/CASE MANAGEMENT/SOCIAL WORK - NSDCCRNAME_GEN_ALL_CORE_FT
[facility] [id number] [place] [id number]

## 2020-12-23 NOTE — PROGRESS NOTE ADULT - REASON FOR ADMISSION
PEG tube dislodgement

## 2020-12-23 NOTE — PROGRESS NOTE ADULT - PROBLEM SELECTOR PLAN 4
Free water deficit of 0.5 L  PEG tube feeds as per nutrition recs  200ml of free water Q6  Trend BMP
PEG tube dislodged at [facility] then s/p PEG replacement at [facility]  abdominal xray shows peg is in place  started glucerna 30ml/hour  nutrition consult placed, follow up recs
Persistent hyperglycemia, required 12 units ISS on 12/15  Lantus 6u  Humalog 2u TID  ISS TID
ISS, FS Q6
Free water deficit of 0.5 L  PEG tube feeds as per nutrition recs  200ml of free water Q6  Trend BMP
Free water deficit of 0.5 L  PEG tube feeds as per nutrition recs  250ml of free water Q4  Trend BMP

## 2020-12-23 NOTE — PROGRESS NOTE ADULT - PROBLEM SELECTOR PLAN 3
Sacral ulcer without purulence or erythema  f/u wound care recommendations. Sacral ulcer without purulence or erythema  Wound care as per wound care team

## 2020-12-23 NOTE — PROGRESS NOTE ADULT - GASTROINTESTINAL DETAILS
soft/nontender/no distention/no guarding/no rigidity

## 2020-12-23 NOTE — PROGRESS NOTE ADULT - PROBLEM SELECTOR PROBLEM 5
DM2 (diabetes mellitus, type 2)
R/O PICC (peripherally inserted central catheter) removal
Intracerebral hemorrhage
DM2 (diabetes mellitus, type 2)
Intracerebral hemorrhage
DM2 (diabetes mellitus, type 2)
DM2 (diabetes mellitus, type 2)

## 2020-12-23 NOTE — PROGRESS NOTE ADULT - PROBLEM SELECTOR PLAN 7
Lovenox 30 for DVT prophylaxis,  Speech and swallow consult to evaluate for handfeeding/pleasure feeding  MOLST with DNR/DNI orders placed in chart after conversation with patient's brother Don
tongue is enlarged, was noted to be same by brother and grandson during prior admission  pending further collateral from outside facility regarding prior workup  no signs of airway compromise
Lovenox 30 for DVT prophylaxis,  Patient did not pass speech eval, to continue NPO for now  MOLST with DNR/DNI orders placed in chart after conversation with patient's brother Don
Lovenox 30 for DVT prophylaxis,  Patient did not pass speech eval, to continue NPO for now  MOLST with DNR/DNI orders placed in chart after conversation with patient's brother Don
tongue is enlarged, was noted to be same by brother and grandson during prior admission  pending further collateral from outside facility regarding prior workup  no signs of airway compromise
Lovenox 30 for DVT prophylaxis,  Patient did not pass speech eval, to continue NPO for now  MOLST with DNR/DNI orders placed in chart after conversation with patient's brother Don
, Hgb likely falsely elevated, 8.6, due to severe dehydration with BUN: Scr ratio of 50;   -B12, TSH, Folate in AM  -Continue Folic Acid 1mg qd once PEG confirmed  -Continue Vitamin B12 once PEG confirmed   -SCDs  -Monitor Hgb daily, recheck after rehydrated
no DVT chemoprophylaxis due to recent ICH, manual compression devices  MOLST with DNR/DNI orders placed in chart after conversation with patient's brother Don
no DVT chemoprophylaxis due to recent ICH, manual compression devices  will confirm code status with patient's brother

## 2020-12-23 NOTE — PROGRESS NOTE ADULT - COMMENTS
pt confused, cannot get ROS

## 2020-12-23 NOTE — PROVIDER CONTACT NOTE (CRITICAL VALUE NOTIFICATION) - SITUATION
pt with low glucose level 52mg/dl
Preliminary blood culture drawn 12/15/2020  Growth in anaerobic bottle and gram positive in cocci in clusters
Preliminary blood culture drawn 12/15/2020 9 am  Growth in aerobic bottle and gram positive in cocci in clusters
Pt's Vanco trough was 26.8
blood culture drawn on 12/15 20 both anaroebic and aroebic bottles positive for MRSA x 4

## 2020-12-23 NOTE — PROGRESS NOTE ADULT - CONSTITUTIONAL DETAILS
no distress/cachectic

## 2020-12-23 NOTE — PROGRESS NOTE ADULT - PROBLEM SELECTOR PLAN 1
MRSA sepsis, unclear etiology. WBC and delirium are improving. No further workup as would not .  Continue Vancomycin 1000mg IV Q12, Vanc trough 19.6  PICC placement for continued IV abx MRSA sepsis, unclear etiology. WBC and delirium are improving. No further workup as would not .  Continue Vancomycin 1000mg IV Q12, Vanc trough 19.6  PICC placed for continued IV abx

## 2020-12-23 NOTE — PROGRESS NOTE ADULT - NUTRITIONAL ASSESSMENT
This patient has been assessed with a concern for Malnutrition and has been determined to have a diagnosis/diagnoses of Severe protein-calorie malnutrition and Underweight/BMI < 19.    This patient is being managed with:   Diet NPO with Tube Feed-  Tube Feeding Modality: Gastrostomy  Glucerna 1.5 Davidson (GLUCERNA1.5RTH)  Total Volume for 24 Hours (mL): 840  Continuous  Starting Tube Feed Rate {mL per Hour}: 10  Increase Tube Feed Rate by (mL): 10     Every 8 hours  Until Goal Tube Feed Rate (mL per Hour): 35  Tube Feed Duration (in Hours): 24  Tube Feed Start Time: 19:00  No Carb Prosource (1pkg = 15gms Protein)     Qty per Day:  3  Entered: Dec 14 2020  6:25PM    
This patient has been assessed with a concern for Malnutrition and has been determined to have a diagnosis/diagnoses of Severe protein-calorie malnutrition and Underweight/BMI < 19.      
This patient has been assessed with a concern for Malnutrition and has been determined to have a diagnosis/diagnoses of Severe protein-calorie malnutrition and Underweight/BMI < 19.    This patient is being managed with:   Diet NPO with Tube Feed-  Tube Feeding Modality: Gastrostomy  Glucerna 1.5 [place] (GLUCERNA1.5RTH)  Total Volume for 24 Hours (mL): 840  Continuous  Starting Tube Feed Rate {mL per Hour}: 10  Increase Tube Feed Rate by (mL): 10     Every 8 hours  Until Goal Tube Feed Rate (mL per Hour): 35  Tube Feed Duration (in Hours): 24  Tube Feed Start Time: 19:00  No Carb Prosource (1pkg = 15gms Protein)     Qty per Day:  3  Entered: Dec 18 2020  7:30AM    
This patient has been assessed with a concern for Malnutrition and has been determined to have a diagnosis/diagnoses of Severe protein-calorie malnutrition and Underweight/BMI < 19.    This patient is being managed with:   Diet NPO with Tube Feed-  Tube Feeding Modality: Gastrostomy  Glucerna 1.5 [place] (GLUCERNA1.5RTH)  Total Volume for 24 Hours (mL): 840  Continuous  Starting Tube Feed Rate {mL per Hour}: 10  Increase Tube Feed Rate by (mL): 10     Every 8 hours  Until Goal Tube Feed Rate (mL per Hour): 35  Tube Feed Duration (in Hours): 24  Tube Feed Start Time: 19:00  No Carb Prosource (1pkg = 15gms Protein)     Qty per Day:  3  Entered: Dec 14 2020  6:25PM    
This patient has been assessed with a concern for Malnutrition and has been determined to have a diagnosis/diagnoses of Severe protein-calorie malnutrition and Underweight/BMI < 19.    This patient is being managed with:   Diet NPO with Tube Feed-  Tube Feeding Modality: Gastrostomy  Glucerna 1.5 [place] (GLUCERNA1.5RTH)  Total Volume for 24 Hours (mL): 840  Continuous  Starting Tube Feed Rate {mL per Hour}: 10  Increase Tube Feed Rate by (mL): 10     Every 8 hours  Until Goal Tube Feed Rate (mL per Hour): 35  Tube Feed Duration (in Hours): 24  Tube Feed Start Time: 19:00  No Carb Prosource (1pkg = 15gms Protein)     Qty per Day:  3  Entered: Dec 14 2020  6:25PM    
This patient has been assessed with a concern for Malnutrition and has been determined to have a diagnosis/diagnoses of Severe protein-calorie malnutrition and Underweight/BMI < 19.    This patient is being managed with:   Diet NPO with Tube Feed-  Tube Feeding Modality: Gastrostomy  Glucerna 1.5 [place] (GLUCERNA1.5RTH)  Total Volume for 24 Hours (mL): 840  Continuous  Starting Tube Feed Rate {mL per Hour}: 10  Increase Tube Feed Rate by (mL): 10     Every 8 hours  Until Goal Tube Feed Rate (mL per Hour): 35  Tube Feed Duration (in Hours): 24  Tube Feed Start Time: 19:00  No Carb Prosource (1pkg = 15gms Protein)     Qty per Day:  3  Entered: Dec 18 2020  7:30AM    
This patient has been assessed with a concern for Malnutrition and has been determined to have a diagnosis/diagnoses of Severe protein-calorie malnutrition and Underweight/BMI < 19.    This patient is being managed with:   Diet NPO with Tube Feed-  Tube Feeding Modality: Gastrostomy  Glucerna 1.5 [place] (GLUCERNA1.5RTH)  Total Volume for 24 Hours (mL): 840  Continuous  Starting Tube Feed Rate {mL per Hour}: 10  Increase Tube Feed Rate by (mL): 10     Every 8 hours  Until Goal Tube Feed Rate (mL per Hour): 35  Tube Feed Duration (in Hours): 24  Tube Feed Start Time: 19:00  No Carb Prosource (1pkg = 15gms Protein)     Qty per Day:  3  Entered: Dec 14 2020  6:25PM    
This patient has been assessed with a concern for Malnutrition and has been determined to have a diagnosis/diagnoses of Severe protein-calorie malnutrition and Underweight/BMI < 19.    This patient is being managed with:   Diet NPO with Tube Feed-  Tube Feeding Modality: Gastrostomy  Glucerna 1.5 [place] (GLUCERNA1.5RTH)  Total Volume for 24 Hours (mL): 840  Continuous  Starting Tube Feed Rate {mL per Hour}: 10  Increase Tube Feed Rate by (mL): 10     Every 8 hours  Until Goal Tube Feed Rate (mL per Hour): 35  Tube Feed Duration (in Hours): 24  Tube Feed Start Time: 19:00  No Carb Prosource (1pkg = 15gms Protein)     Qty per Day:  3  Entered: Dec 18 2020  7:30AM

## 2020-12-23 NOTE — PROGRESS NOTE ADULT - PROBLEM SELECTOR PROBLEM 1
Sepsis
Sepsis, due to unspecified organism, unspecified whether acute organ dysfunction present
Sepsis

## 2020-12-23 NOTE — PROVIDER CONTACT NOTE (CRITICAL VALUE NOTIFICATION) - ACTION/TREATMENT ORDERED:
pt on vancomycin nio new orders
10pm dose vanco not given, vanco dose decreased to 750mg daily to start tomorrow as ordered. will continue to monitor
MD made aware, pt is already on IV abx, no new order made at this time. Will continue to monitor.
MD made aware, pt is already on IV abx, no new order made at this time. Will continue to monitor.

## 2020-12-23 NOTE — PROGRESS NOTE ADULT - PROBLEM SELECTOR PLAN 5
Labile blood glucose  Lantus 12U QHS  ISS TID
Persistent hyperglycemia,  Lantus 15U QHS  ISS TID
Unclear indication for existing PICC line vs PICC line malfunction;  Official CXR report: No PICC line visible.  -As per [facility] there is no documentation of the indication for the patient's PICC. It was not accessed at [place] over the last 10 days.
hx of ICH  continue Keppra for seizure prophylaxis
hx of ICH  continue Keppra for seizure prophylaxis

## 2020-12-23 NOTE — PROVIDER CONTACT NOTE (CRITICAL VALUE NOTIFICATION) - BACKGROUND
Pt admitted with MRSA blood and pressure ulcer
dx leukocytosis
pt admitted with Leukocytosis
pt admitted with Leukocytosis
pt was admitted for lEUKOCYTOSIS

## 2020-12-23 NOTE — PROGRESS NOTE ADULT - SUBJECTIVE AND OBJECTIVE BOX
Darnell Elkins MD PGY1   Team 7  Contact/Pager - [phone]      Patient is a 89y old  Male who presents with a chief complaint of PEG tube dislodgement (23 Dec 2020 08:15)        SUBJECTIVE / OVERNIGHT EVENTS: The overnight coverage was paged about elevated vanc trough. Vancomycin dose was held last night. Repeat trough was drawn this morning. There were no other overnight events. The patient is arousable to voice. Asks the writer "How are you doing?" Oriented to person and year. He denies any complaints.      MEDICATIONS  (STANDING):  chlorhexidine 4% Liquid 1 Application(s) Topical daily  dextrose 40% Gel 15 Gram(s) Oral once  dextrose 5%. 1000 milliLiter(s) (50 mL/Hr) IV Continuous <Continuous>  dextrose 5%. 1000 milliLiter(s) (100 mL/Hr) IV Continuous <Continuous>  dextrose 50% Injectable 25 Gram(s) IV Push once  dextrose 50% Injectable 25 Gram(s) IV Push once  dextrose 50% Injectable 12.5 Gram(s) IV Push once  dextrose 50% Injectable 25 Gram(s) IV Push once  diatrizoate meglumine/diatrizoate sodium. 30 milliLiter(s) Oral once  enoxaparin Injectable 30 milliGRAM(s) SubCutaneous daily  glucagon  Injectable 1 milliGRAM(s) IntraMuscular once  insulin glargine Injectable (LANTUS) 12 Unit(s) SubCutaneous at bedtime  insulin lispro (ADMELOG) corrective regimen sliding scale   SubCutaneous every 6 hours  levETIRAcetam  IVPB 750 milliGRAM(s) IV Intermittent every 12 hours  melatonin 3 milliGRAM(s) Oral at bedtime  sodium chloride 0.45%. 1000 milliLiter(s) (75 mL/Hr) IV Continuous <Continuous>  sodium chloride 0.9% lock flush 3 milliLiter(s) IV Push every 8 hours  thiamine 100 milliGRAM(s) Oral daily    MEDICATIONS  (PRN):  haloperidol    Injectable 0.25 milliGRAM(s) IV Push every 6 hours PRN Agitation      Allergies    No Known Allergies    Intolerances          Vital Signs Last 24 Hrs  T(C): 36.7 (23 Dec 2020 07:30), Max: 37.3 (22 Dec 2020 14:11)  T(F): 98 (23 Dec 2020 07:30), Max: 99.1 (22 Dec 2020 14:11)  HR: 60 (23 Dec 2020 07:30) (58 - 63)  BP: 151/50 (23 Dec 2020 07:30) (143/73 - 166/66)  BP(mean): --  RR: 19 (23 Dec 2020 07:30) (19 - 20)  SpO2: 99% (23 Dec 2020 07:30) (98% - 99%)  CAPILLARY BLOOD GLUCOSE      POCT Blood Glucose.: 109 mg/dL (23 Dec 2020 07:32)  POCT Blood Glucose.: 139 mg/dL (22 Dec 2020 21:45)  POCT Blood Glucose.: 112 mg/dL (22 Dec 2020 18:35)  POCT Blood Glucose.: 163 mg/dL (22 Dec 2020 12:31)    I&O's Summary    22 Dec 2020 07:01  -  23 Dec 2020 07:00  --------------------------------------------------------  IN: 400 mL / OUT: 1050 mL / NET: -650 mL      PHYSICAL EXAM:  GENERAL: thin frail elderly, alert  HEAD:  AT, NC  EYES: EOMI, PERRLA, conjunctiva and sclera clear  ENMT: Airway patent. dry mucus membranes, poor dentition, macroglossia  NECK: Supple,   CHEST/LUNG: CTABL;  HEART: RRR; Normal S1, S2. No murmurs, rubs, or gallops  ABDOMEN: Soft, NT, ND; Bowel sounds present. No organomegaly, PEG tube in place  GI/-zamudio inplace with yellow urine,   EXTREMITIES:  2+ Peripheral Pulses, No clubbing, cyanosis, or edema  PSYCH: affect is stable and appropriate  NEUROLOGY: no obvious focal motor deficits  SKIN: sacral decubitus    pro    LABS:                        7.9    14.91 )-----------( 262      ( 23 Dec 2020 08:10 )             25.1     12-23    141  |  108<H>  |  17  ----------------------------<  103<H>  3.8   |  28  |  0.74    Ca    8.3<L>      23 Dec 2020 08:10  Phos  3.0     12-23  Mg     2.1     12-23                        RADIOLOGY & ADDITIONAL TESTS:    Imaging Personally Reviewed: YES    Consultant(s) Notes Reviewed: YES    Care Discussed with Consultants/Other Providers: YES

## 2020-12-23 NOTE — PROGRESS NOTE ADULT - ATTENDING COMMENTS
89 yo M w/ hx DM and DKA, [place], CVA s/p fall , Decubitus ulcer, chronic zamudio p/w dislodged PEG. PEG replaced and confirmed. On routine labs was found to have a  elevated WBC found to have LL PNA on Xray. Bcx- Gram positive cocci clusters PCR + MRSA Ucx-Ecoli. CT chest with nodular opacity possible cavitation concern for septic emboli.   # Sepsis w/ MRSA bactermia unclear source suspecting endocarditis -12/13 and 12/15 Bcx +MRSA/ IV line Cx- NGTD   - s/p zosyn for UTI Ucx E coli   -repeat Bcx- 12/16-NGTD  - TTE eval for endocarditis neg. ID recommend MAXX. pt not a candidate for any surgical procedure. MAXX unlikely to  will treat with 6 weeks of antibiotics.  - review of hospitalization records at [facility] indicate elevated WBC in 30K initial suspicion for meningitis stopped after LP. fluctuant eye lesion biopsied and Cx +MRSA. not dc'ed on antibiotics.   - optho c/s no acute intervention not source of MRSA  - s/p PICC   - vanco trough 26 ON held vanco and will need ID recs on discharge for vanco dosing   # DM- FS q6 ISS A1c 8.8. lantus 8  tube feeds restarted. FS <180  # Hypernatremia- Improved cont free water. now normal 141  # Prior CVA- without overt deficit. cont keppra. 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 repeat CT head 12/17 Age indeterminate right STACIE distribution frontal lobe infarct. No significant intracranial hemorrhage identified. Bifrontal low-density extra-axial fluid which may represent prominent subarachnoid spaces, subdural hygromas or chronic subdural hematomas.   neuro consult in regards to AC no contraindication to AC.   # PAF- CHADSVASc score 6. currently rates controlled. EKG SR. family electing not to AC.  # Decubitus Ulcer-sacral stage 3 wound care as per recs  # protein calorie malnutrition -PEG tube in place and confirmed start Glucerna 35 ml/hr as per nutrition.    # Anemia- AOCD- fe studies reviewed retic count low likely in setting of infection. rectal exam consistent with brown stool keep H/H>7/21  # GOC- DNR/DNI  # Disp: rehab discharge 37 min

## 2020-12-23 NOTE — PROVIDER CONTACT NOTE (CRITICAL VALUE NOTIFICATION) - RECOMMENDATIONS
FSBS REPEATED 64mg/dl , then 59mg/dl  juice given through peg will repeat fsbs
iv Abx
iv Abx
hold vanco dose

## 2020-12-23 NOTE — PROVIDER CONTACT NOTE (CRITICAL VALUE NOTIFICATION) - TEST AND RESULT REPORTED:
MRSA IN BLOOD CULTURE
Vanco trough 26.8
Preliminary blood culture drawn 12/15/2020  Growth in anaerobic bottle and gram positive in cocci in clusters
Preliminary blood culture drawn 12/15/2020 9 am  Growth in aerobic bottle and gram positive in cocci in clusters
glucoses was 52mg/dl

## 2020-12-23 NOTE — PROGRESS NOTE ADULT - ATTENDING COMMENTS
Amandeep Briceño  Attending Physician   Division of Infectious Disease  Pager #[phone]  After 5pm/weekend or no response, call #[phone]    I am away from 12/24 and will return 12/29. ID coverage available. Call ID office @ [phone] with questions.

## 2020-12-23 NOTE — PROGRESS NOTE ADULT - PROBLEM SELECTOR PROBLEM 6
Intracerebral hemorrhage
FTT (failure to thrive) in adult
Intracerebral hemorrhage
Macroglossia
Macroglossia
Intracerebral hemorrhage
Intracerebral hemorrhage

## 2020-12-23 NOTE — PROVIDER CONTACT NOTE (CRITICAL VALUE NOTIFICATION) - ASSESSMENT
Pt's fsbs repeated x2 first was 64mg/dl and seccond was 59mg/dl
Pt responsive, confused, no signs of acute distress
pt Asymptomatic, vitals stable.
pt Asymptomatic, vitals stable.

## 2020-12-23 NOTE — PROGRESS NOTE ADULT - PROBLEM SELECTOR PROBLEM 3
Decubitus ulcer of sacral area
Decubitus ulcer of sacral area
Hypernatremia
Decubitus ulcer of sacral area
Hypernatremia
Decubitus ulcer of sacral area

## 2020-12-23 NOTE — PROGRESS NOTE ADULT - PROBLEM SELECTOR PROBLEM 4
Hypernatremia
Hypernatremia
PEG tube malfunction
DM2 (diabetes mellitus, type 2)
DM2 (diabetes mellitus, type 2)
Hypernatremia

## 2020-12-23 NOTE — PROGRESS NOTE ADULT - ASSESSMENT
89M, bedbound resident at  [facility] PMH of HTN, DM, h/o chronic ETOH abuse, ICH s/p fall (recently treated at [facility] on 11/3/20 for ICH s/p fall & DKA) started on Keppra for seizure ppx, dysphagia s/p PEG placement and chronic zamudio admitted for sepsis. Of note pt has a PICC line for unclear reasons. Pt found to be bacteremic, blood culture growing MRSA.    #MRSA bacteremia  - blood cultures growing MRSA x2 bottles 12/13, source likely PICC line  - Stage 3 pressure ulcer does not appear acutely infected  - CT chest showing several peripheral nodular opacities some with lucency/cavitations, suspicious for septic emboli    Plan:  - continue vanc 1000mg q24  - repeat blood cultures negative so far  - f/u vanc trough levels - keep 15-20  - TTE noted  - pt too high risk for any cardiac procedure  - will treat for 6 weeks with iv abx given likely septic emboli in lungs  - CT chest with opacities suspicious for septic emboli  - appreciate wound care recs  - consider palliative eval   - day 8/42 of abx

## 2020-12-23 NOTE — PROGRESS NOTE ADULT - PROBLEM SELECTOR PLAN 2
Patient with drop in Hg from 9.2-->7.7  Repeat CBC shows Hg stable at 8.  Iron studies consistent with anemia of chronic disease  B12 and folate WNL  RADHA without evidence of GI bleeding  Type and Screen obtained, Will trend daily CBC
Patient with drop in Hg from 9.2-->7.7  Repeat CBC shows Hg stable at 8.  Iron studies consistent with anemia of chronic disease  B12 and folate WNL  RADHA without evidence of GI bleeding  Type and Screen obtained, Will trend daily CBC
-Urinary catheter: replaced on 9S  -Empiric zosyn  -MRSA positive blood culture x 2 bottles, UCx pending
Patient with drop in Hg from 9.2-->7.7  Repeat CBC shows Hg stable at 8.  Iron studies consistent with anemia of chronic disease  B12 and folate WNL  RADHA without evidence of GI bleeding  Type and Screen obtained, Will trend daily CBC
f/u wound care recommendations.
Patient with drop in Hg from 9.2-->7.7  Repeat CBC shows Hg stable at 8.  Iron studies consistent with anemia of chronic disease  B12 and folate WNL  RADHA without evidence of GI bleeding  Type and Screen obtained, Will trend daily CBC
f/u wound care recomendations
Patient with drop in Hg from 9.2-->7.7  Repeat CBC shows Hg stable at 8.  Iron studies consistent with anemia of chronic disease  B12 and folate WNL  RADHA without evidence of GI bleeding  Type and Screen obtained, Will trend daily CBC
Patient with drop in Hg from 9.2-->7.7  Repeat CBC shows Hg stable at 8.  Iron studies consistent with anemia of chronic disease  B12 and folate WNL  RADHA without evidence of GI bleeding  Type and Screen obtained, Will trend daily CBC

## 2020-12-23 NOTE — DISCHARGE NOTE NURSING/CASE MANAGEMENT/SOCIAL WORK - PATIENT PORTAL LINK FT
You can access the FollowMyHealth Patient Portal offered by [facility] by registering at the following website: [email or web address]. By joining [employer]’s FollowMyHealth portal, you will also be able to view your health information using other applications (apps) compatible with our system.

## 2020-12-23 NOTE — PROGRESS NOTE ADULT - PROVIDER SPECIALTY LIST ADULT
Infectious Disease
Infectious Disease
Internal Medicine
Infectious Disease
Internal Medicine
Internal Medicine
Infectious Disease

## 2020-12-23 NOTE — PROGRESS NOTE ADULT - ASSESSMENT
89 year old man history of DM, hard of hearing, traumatic ICH, sacral decubitus ulcer, chronic zamudio, presenting with dislodged PEG, meeting sepsis criteria, BCx positive for MRSA, UCx positive for Ecoli, tissue culture of right eye biopsy positive for MRSA at outside hospital during prior admission.      89 year old man history of DM, hard of hearing, traumatic ICH, sacral decubitus ulcer, chronic zamudio, presenting with dislodged PEG, meeting sepsis criteria, BCx positive for MRSA, CT positive for likely septic emboli, UCx positive for Ecoli, tissue culture of right eye biopsy positive for MRSA at outside hospital during prior admission.

## 2020-12-23 NOTE — PROGRESS NOTE ADULT - PROBLEM SELECTOR PROBLEM 2
Anemia
Anemia
Complicated UTI (urinary tract infection)
Anemia
Decubitus ulcer of sacral area
Decubitus ulcer of sacral area
Anemia

## 2020-12-23 NOTE — PROGRESS NOTE ADULT - PROBLEM SELECTOR PLAN 6
hx of ICH, hospitalized at [facility] in November 2/2 fall and traumatic ICH  continue Keppra for seizure prophylaxis.  Neuro cleared patient to resume full AC to prevent future thromboembolism in the setting of recent atrial fibrillation   Risks and benefits discussion to be revisited with the patient's family regarding starting AC. hx of ICH, hospitalized at [facility] in November 2/2 fall and traumatic ICH  continue Keppra for seizure prophylaxis.  Neuro cleared patient to resume full AC to prevent future thromboembolism in the setting of recent atrial fibrillation   Risks and benefits discussion to be revisited with the patient's family regarding starting AC. Decision reached to hold AC for now given the patient's functional status.

## 2020-12-23 NOTE — PROGRESS NOTE ADULT - PROBLEM SELECTOR PROBLEM 7
Discharge planning issues
Discharge planning issues
Macroglossia
Discharge planning issues
Discharge planning issues
Macrocytic anemia
Discharge planning issues
Discharge planning issues
Macroglossia

## 2020-12-23 NOTE — PROGRESS NOTE ADULT - SUBJECTIVE AND OBJECTIVE BOX
HERVE NGUYEN 89y [id number]    Patient is a 89y old  Male who presents with a chief complaint of PEG tube dislodgement (23 Dec 2020 08:15)      Follow Up/CC:  ID following for bacteremia    Interval History/ROS: no fever    Allergies    No Known Allergies    Intolerances        ANTIMICROBIALS:      MEDICATIONS  (STANDING):  chlorhexidine 4% Liquid 1 Application(s) Topical daily  dextrose 40% Gel 15 Gram(s) Oral once  dextrose 5%. 1000 milliLiter(s) (50 mL/Hr) IV Continuous <Continuous>  dextrose 5%. 1000 milliLiter(s) (100 mL/Hr) IV Continuous <Continuous>  dextrose 50% Injectable 25 Gram(s) IV Push once  dextrose 50% Injectable 25 Gram(s) IV Push once  dextrose 50% Injectable 12.5 Gram(s) IV Push once  dextrose 50% Injectable 25 Gram(s) IV Push once  diatrizoate meglumine/diatrizoate sodium. 30 milliLiter(s) Oral once  enoxaparin Injectable 30 milliGRAM(s) SubCutaneous daily  glucagon  Injectable 1 milliGRAM(s) IntraMuscular once  insulin glargine Injectable (LANTUS) 12 Unit(s) SubCutaneous at bedtime  insulin lispro (ADMELOG) corrective regimen sliding scale   SubCutaneous every 6 hours  levETIRAcetam  IVPB 750 milliGRAM(s) IV Intermittent every 12 hours  melatonin 3 milliGRAM(s) Oral at bedtime  sodium chloride 0.45%. 1000 milliLiter(s) (75 mL/Hr) IV Continuous <Continuous>  sodium chloride 0.9% lock flush 3 milliLiter(s) IV Push every 8 hours  thiamine 100 milliGRAM(s) Oral daily    MEDICATIONS  (PRN):  haloperidol    Injectable 0.25 milliGRAM(s) IV Push every 6 hours PRN Agitation        Vital Signs Last 24 Hrs  T(C): 36.7 (23 Dec 2020 07:30), Max: 37.3 (22 Dec 2020 14:11)  T(F): 98 (23 Dec 2020 07:30), Max: 99.1 (22 Dec 2020 14:11)  HR: 57 (23 Dec 2020 13:52) (57 - 63)  BP: 142/48 (23 Dec 2020 13:52) (142/48 - 166/66)  BP(mean): --  RR: 16 (23 Dec 2020 13:52) (16 - 20)  SpO2: 100% (23 Dec 2020 13:52) (98% - 100%)    CBC Full  -  ( 23 Dec 2020 08:10 )  WBC Count : 14.91 K/uL  RBC Count : 2.54 M/uL  Hemoglobin : 7.9 g/dL  Hematocrit : 25.1 %  Platelet Count - Automated : 262 K/uL  Mean Cell Volume : 98.8 fL  Mean Cell Hemoglobin : 31.1 pg  Mean Cell Hemoglobin Concentration : 31.5 gm/dL  Auto Neutrophil # : 11.06 K/uL  Auto Lymphocyte # : 1.80 K/uL  Auto Monocyte # : 1.01 K/uL  Auto Eosinophil # : 0.71 K/uL  Auto Basophil # : 0.08 K/uL  Auto Neutrophil % : 74.1 %  Auto Lymphocyte % : 12.1 %  Auto Monocyte % : 6.8 %  Auto Eosinophil % : 4.8 %  Auto Basophil % : 0.5 %    12-23    141  |  108<H>  |  17  ----------------------------<  103<H>  3.8   |  28  |  0.74    Ca    8.3<L>      23 Dec 2020 08:10  Phos  3.0     12-23  Mg     2.1     12-23            MICROBIOLOGY:  .Blood Blood-Peripheral  12-16-20   No Growth Final  --  --      .Blood Blood  12-16-20   No Growth Final  --  --      .Blood Blood-Venous  12-15-20   Growth in aerobic and anaerobic bottles: Methicillin resistant  Staphylococcus aureus  See previous culture [id number]  --    Growth in anaerobic bottle: Gram Positive Cocci in Clusters  Growth in aerobic bottle: Gram Positive Cocci in Clusters      .Catheter PICC Tip  12-14-20   No growth  --  --      .Urine Catheterized  12-13-20   >100,000 CFU/ml Escherichia coli  --  Escherichia coli      .Blood Blood  12-13-20   Growth in aerobic and anaerobic bottles: Methicillin resistant  Staphylococcus aureus  "Due to technical problems, Proteus sp. and Pseudomonas aeruginosa will  Not be reported as part of the BCID panel  ***Blood Panel PCR results on this specimen are available  approximately 3 hours after the Gram stain result.***  Gram stain, PCR, and/or culture results may not always  correspond due to difference in methodologies.  ************************************************************  This PCR assay was performed using [employer].  The following targets are tested for: Enterococcus,  vancomycin resistant enterococci, Listeria monocytogenes,  coagulase negative staphylococci, S. aureus,  methicillin resistant S. aureus, Streptococcus agalactiae  (Group B), S. pneumoniae, S. pyogenes (Group A),  Acinetobacter baumannii, Enterobacter cloacae, E. coli,  Klebsiella oxytoca, K. pneumoniae, Proteus sp.,  Serratia marcescens, Haemophilus influenzae,  Neisseria meningitidis, Pseudomonas aeruginosa, Candida  albicans, C. glabrata, C krusei, C parapsilosis,  C. tropicalis and the KPC resistance gene.  --  Blood Culture PCR  Methicillin resistant Staphylococcus aureus      .Blood Blood  12-13-20   Growth in aerobic and anaerobic bottles: Methicillin resistant  Staphylococcus aureus See previous culture [id number]  --    Growth in aerobic bottle: Gram Positive Cocci in Clusters  Growth in anaerobic bottle: Gram Positive Cocci in Clusters        Vancomycin Level, Random: 18.0 ug/mL (12-23-20 @ 11:06)  Vancomycin Level, Trough: 26.8 ug/mL (12-22-20 @ 21:47)            RADIOLOGY    < from: Xray Abdomen 1 View PORTABLE -Urgent (Xray Abdomen 1 View PORTABLE -Urgent .) (12.21.20 @ 19:24) >  Injected contrast is seen entering the stomach through the gastrostomy.    < end of copied text >

## 2021-01-19 ENCOUNTER — APPOINTMENT (OUTPATIENT)
Dept: RADIOLOGY | Facility: HOSPITAL | Age: 86
End: 2021-01-19

## 2021-01-19 ENCOUNTER — OUTPATIENT (OUTPATIENT)
Dept: OUTPATIENT SERVICES | Facility: HOSPITAL | Age: 86
LOS: 1 days | End: 2021-01-19
Payer: MEDICARE

## 2021-01-19 DIAGNOSIS — Z43.9 ENCOUNTER FOR ATTENTION TO UNSPECIFIED ARTIFICIAL OPENING: ICD-10-CM

## 2021-01-19 PROBLEM — I61.9 NONTRAUMATIC INTRACEREBRAL HEMORRHAGE, UNSPECIFIED: Chronic | Status: ACTIVE | Noted: 2020-12-13

## 2021-01-19 PROBLEM — R13.10 DYSPHAGIA, UNSPECIFIED: Chronic | Status: ACTIVE | Noted: 2020-12-13

## 2021-01-19 PROBLEM — I10 ESSENTIAL (PRIMARY) HYPERTENSION: Chronic | Status: ACTIVE | Noted: 2020-12-13

## 2021-01-19 PROBLEM — F10.10 ALCOHOL ABUSE, UNCOMPLICATED: Chronic | Status: ACTIVE | Noted: 2020-12-13

## 2021-01-19 PROBLEM — E11.9 TYPE 2 DIABETES MELLITUS WITHOUT COMPLICATIONS: Chronic | Status: ACTIVE | Noted: 2020-12-13

## 2021-01-19 PROBLEM — I25.10 ATHEROSCLEROTIC HEART DISEASE OF NATIVE CORONARY ARTERY WITHOUT ANGINA PECTORIS: Chronic | Status: ACTIVE | Noted: 2020-12-13

## 2021-01-19 PROCEDURE — 49465 FLUORO EXAM OF G/COLON TUBE: CPT
